# Patient Record
Sex: FEMALE | Race: WHITE | NOT HISPANIC OR LATINO | Employment: FULL TIME | URBAN - NONMETROPOLITAN AREA
[De-identification: names, ages, dates, MRNs, and addresses within clinical notes are randomized per-mention and may not be internally consistent; named-entity substitution may affect disease eponyms.]

---

## 2017-01-02 DIAGNOSIS — S29.012A RHOMBOID MUSCLE STRAIN, INITIAL ENCOUNTER: Primary | ICD-10-CM

## 2017-01-02 DIAGNOSIS — S46.819A STRAIN OF TRAPEZIUS MUSCLE: ICD-10-CM

## 2017-01-04 RX ORDER — CYCLOBENZAPRINE HCL 5 MG
TABLET ORAL
Qty: 30 TABLET | Refills: 0 | Status: SHIPPED | OUTPATIENT
Start: 2017-01-04 | End: 2017-05-04

## 2017-01-04 NOTE — TELEPHONE ENCOUNTER
flexeril      Last Written Prescription Date: 11/16/15  Last Fill Quantity: 30,  # refills: 1   Last Office Visit with G, [facility] or [facility] prescribing provider: 10/26/16

## 2017-01-24 ENCOUNTER — TRANSFERRED RECORDS (OUTPATIENT)
Dept: HEALTH INFORMATION MANAGEMENT | Facility: HOSPITAL | Age: 52
End: 2017-01-24

## 2017-04-06 ENCOUNTER — OFFICE VISIT (OUTPATIENT)
Dept: FAMILY MEDICINE | Facility: OTHER | Age: 52
End: 2017-04-06
Attending: FAMILY MEDICINE
Payer: COMMERCIAL

## 2017-04-06 VITALS
WEIGHT: 200 LBS | DIASTOLIC BLOOD PRESSURE: 70 MMHG | SYSTOLIC BLOOD PRESSURE: 122 MMHG | BODY MASS INDEX: 33.32 KG/M2 | HEART RATE: 64 BPM | HEIGHT: 65 IN | TEMPERATURE: 98.3 F

## 2017-04-06 DIAGNOSIS — Z11.59 ENCOUNTER FOR SCREENING FOR OTHER VIRAL DISEASES: ICD-10-CM

## 2017-04-06 DIAGNOSIS — I10 ESSENTIAL HYPERTENSION, BENIGN: ICD-10-CM

## 2017-04-06 DIAGNOSIS — G35 MS (MULTIPLE SCLEROSIS) (H): ICD-10-CM

## 2017-04-06 DIAGNOSIS — R25.2 CRAMP OF LIMB: Primary | ICD-10-CM

## 2017-04-06 DIAGNOSIS — E78.5 HYPERLIPIDEMIA LDL GOAL <130: ICD-10-CM

## 2017-04-06 DIAGNOSIS — E03.9 HYPOTHYROIDISM, UNSPECIFIED TYPE: ICD-10-CM

## 2017-04-06 LAB
ALBUMIN SERPL-MCNC: 3.8 G/DL (ref 3.4–5)
ALP SERPL-CCNC: 83 U/L (ref 40–150)
ALT SERPL W P-5'-P-CCNC: 18 U/L (ref 0–50)
ANION GAP SERPL CALCULATED.3IONS-SCNC: 10 MMOL/L (ref 3–14)
AST SERPL W P-5'-P-CCNC: 15 U/L (ref 0–45)
BILIRUB SERPL-MCNC: 0.2 MG/DL (ref 0.2–1.3)
BUN SERPL-MCNC: 15 MG/DL (ref 7–30)
CALCIUM SERPL-MCNC: 8.9 MG/DL (ref 8.5–10.1)
CHLORIDE SERPL-SCNC: 103 MMOL/L (ref 94–109)
CO2 SERPL-SCNC: 30 MMOL/L (ref 20–32)
CREAT SERPL-MCNC: 0.66 MG/DL (ref 0.52–1.04)
GFR SERPL CREATININE-BSD FRML MDRD: NORMAL ML/MIN/1.7M2
GLUCOSE SERPL-MCNC: 86 MG/DL (ref 70–99)
POTASSIUM SERPL-SCNC: 4.1 MMOL/L (ref 3.4–5.3)
PROT SERPL-MCNC: 7.5 G/DL (ref 6.8–8.8)
SODIUM SERPL-SCNC: 143 MMOL/L (ref 133–144)
TSH SERPL DL<=0.005 MIU/L-ACNC: 3.34 MU/L (ref 0.4–4)

## 2017-04-06 PROCEDURE — 36415 COLL VENOUS BLD VENIPUNCTURE: CPT | Performed by: FAMILY MEDICINE

## 2017-04-06 PROCEDURE — 86803 HEPATITIS C AB TEST: CPT | Mod: 90 | Performed by: FAMILY MEDICINE

## 2017-04-06 PROCEDURE — 99214 OFFICE O/P EST MOD 30 MIN: CPT | Performed by: FAMILY MEDICINE

## 2017-04-06 PROCEDURE — 84443 ASSAY THYROID STIM HORMONE: CPT | Performed by: FAMILY MEDICINE

## 2017-04-06 PROCEDURE — 80053 COMPREHEN METABOLIC PANEL: CPT | Performed by: FAMILY MEDICINE

## 2017-04-06 PROCEDURE — 99000 SPECIMEN HANDLING OFFICE-LAB: CPT | Performed by: FAMILY MEDICINE

## 2017-04-06 ASSESSMENT — ANXIETY QUESTIONNAIRES
1. FEELING NERVOUS, ANXIOUS, OR ON EDGE: NOT AT ALL
IF YOU CHECKED OFF ANY PROBLEMS ON THIS QUESTIONNAIRE, HOW DIFFICULT HAVE THESE PROBLEMS MADE IT FOR YOU TO DO YOUR WORK, TAKE CARE OF THINGS AT HOME, OR GET ALONG WITH OTHER PEOPLE: NOT DIFFICULT AT ALL
3. WORRYING TOO MUCH ABOUT DIFFERENT THINGS: NOT AT ALL
2. NOT BEING ABLE TO STOP OR CONTROL WORRYING: NOT AT ALL
7. FEELING AFRAID AS IF SOMETHING AWFUL MIGHT HAPPEN: NOT AT ALL
GAD7 TOTAL SCORE: 1
5. BEING SO RESTLESS THAT IT IS HARD TO SIT STILL: NOT AT ALL
4. TROUBLE RELAXING: SEVERAL DAYS
6. BECOMING EASILY ANNOYED OR IRRITABLE: NOT AT ALL

## 2017-04-06 ASSESSMENT — ASTHMA QUESTIONNAIRES
QUESTION_3 LAST FOUR WEEKS HOW OFTEN DID YOUR ASTHMA SYMPTOMS (WHEEZING, COUGHING, SHORTNESS OF BREATH, CHEST TIGHTNESS OR PAIN) WAKE YOU UP AT NIGHT OR EARLIER THAN USUAL IN THE MORNING: NOT AT ALL
QUESTION_1 LAST FOUR WEEKS HOW MUCH OF THE TIME DID YOUR ASTHMA KEEP YOU FROM GETTING AS MUCH DONE AT WORK, SCHOOL OR AT HOME: NONE OF THE TIME
QUESTION_4 LAST FOUR WEEKS HOW OFTEN HAVE YOU USED YOUR RESCUE INHALER OR NEBULIZER MEDICATION (SUCH AS ALBUTEROL): NOT AT ALL
QUESTION_2 LAST FOUR WEEKS HOW OFTEN HAVE YOU HAD SHORTNESS OF BREATH: NOT AT ALL
QUESTION_5 LAST FOUR WEEKS HOW WOULD YOU RATE YOUR ASTHMA CONTROL: COMPLETELY CONTROLLED
ACT_TOTALSCORE: 25

## 2017-04-06 ASSESSMENT — PAIN SCALES - GENERAL: PAINLEVEL: MODERATE PAIN (4)

## 2017-04-06 NOTE — PROGRESS NOTES
SUBJECTIVE:                                                    Yvonne Llanos is a 51 year old female who presents to clinic today for the following health issues:      Musculoskeletal problem/pain      Duration: 4 days    Description  Location: left side of neck    Intensity:  moderate    Accompanying signs and symptoms: felt like a milton horse    History  Previous similar problem: no   Previous evaluation:  none    Precipitating or alleviating factors:  Trauma or overuse: no   Aggravating factors include: moving head side to side     Therapies tried and outcome: heat and massage       Hypothyroidism Follow-up      Since last visit, patient describes the following symptoms: weight gain of 40 lbs in 3-4 mos, constipation and fatigue       Problem list and histories reviewed & adjusted, as indicated.  Additional history: as documented    Current Outpatient Prescriptions   Medication Sig Dispense Refill     [START ON 4/11/2017] metaxalone (SKELAXIN) 800 MG tablet TAKE 1 TABLET BY MOUTH THREE TIMES DAILY 90 tablet 1     GABAPENTIN PO Take 300 mg by mouth 3 times daily       cyclobenzaprine (FLEXERIL) 5 MG tablet TAKE 1 TABLET BY MOUTH NIGHTLY AS NEEDED FOR MUSCLE SPASMS 30 tablet 0     cholecalciferol (VITAMIN D3) 5000 UNITS CAPS capsule Take 1 capsule (5,000 Units) by mouth daily  0     atenolol (TENORMIN) 25 MG tablet TAKE 1 TABLET BY MOUTH TWICE DAILY 60 tablet 11     butalbital-acetaminophen-caffeine (FIORICET, ESGIC) -40 MG per tablet TAKE 1 TABLET BY MOUTH THREE TIMES WEEKLY AS NEEDED FOR HEADACHE 30 tablet 5     LOSARTAN POTASSIUM PO Take 100 mg by mouth At Bedtime        folic acid-vit B6-vit B12 (FOLGARD) 0.8-10-0.115 MG TABS Take 1 tablet by mouth daily       montelukast (SINGULAIR) 10 MG tablet TAKE 1 TABLET BY MOUTH EVERY EVENING 30 tablet 7     Multiple Vitamins-Minerals (MULTIVITAMIN GUMMIES ADULT) CHEW Take 2 tablets by mouth daily 30 tablet 0     [DISCONTINUED] gabapentin (NEURONTIN) 100 MG  "capsule Take 1-2 capsules (100-200 mg) by mouth nightly as needed 40 capsule 0     Labs reviewed in EPIC    ROS:  Constitutional, HEENT, cardiovascular, pulmonary, gi and gu systems are negative, except as otherwise noted.    OBJECTIVE:                                                    /70  Pulse 64  Temp 98.3  F (36.8  C) (Tympanic)  Ht 5' 4.5\" (1.638 m)  Wt 200 lb (90.7 kg)  BMI 33.8 kg/m2  Body mass index is 33.8 kg/(m^2).  GENERAL APPEARANCE: healthy, alert, no distress and over weight  HENT: ear canals and TM's normal, nose and mouth without ulcers or lesions and tonsils show tonsilliths  NECK: no adenopathy, no asymmetry, masses, or scars and thyroid normal to palpation  RESP: lungs clear to auscultation - no rales, rhonchi or wheezes  CV: regular rates and rhythm, normal S1 S2, no S3 or S4 and no murmur, click or rub  MS: extremities normal- no gross deformities noted  PSYCH: mentation appears normal and affect normal/bright       ASSESSMENT/PLAN:                                                    1. Cramp of limb    - Comprehensive metabolic panel  - TSH with free T4 reflex    2. Essential hypertension, benign      3. Hyperlipidemia LDL goal <130      4. Hypothyroidism, unspecified type    - Comprehensive metabolic panel  - TSH with free T4 reflex    5. Encounter for screening for other viral diseases    - Hepatitis C Screen Reflex to HCV RNA Quant and Genotype    She was encouraged to schedule CPE in the near fuutre      Patient was agreeable to this plan and had no further questions.  See Patient Instructions    Yudy Hsu MD  [facility] [place]      "

## 2017-04-06 NOTE — NURSING NOTE
"Chief Complaint   Patient presents with     Musculoskeletal Problem       Initial /70  Pulse 64  Temp 98.3  F (36.8  C) (Tympanic)  Ht 5' 4.5\" (1.638 m)  Wt 200 lb (90.7 kg)  BMI 33.8 kg/m2 Estimated body mass index is 33.8 kg/(m^2) as calculated from the following:    Height as of this encounter: 5' 4.5\" (1.638 m).    Weight as of this encounter: 200 lb (90.7 kg).  Medication Reconciliation: complete   Cathy Greene    "

## 2017-04-06 NOTE — MR AVS SNAPSHOT
After Visit Summary   4/6/2017    Yvonne Llanos    MRN: [id number]           Patient Information     Date Of Birth          1965        Visit Information        Provider Department      4/6/2017 2:30 PM Yudy Hsu MD [facility]        Today's Diagnoses     Cramp of limb    -  1    Essential hypertension, benign        Hyperlipidemia LDL goal <130        Hypothyroidism, unspecified type        Encounter for screening for other viral diseases           Follow-ups after your visit        Your next 10 appointments already scheduled     May 04, 2017  1:30 PM CDT   (Arrive by 1:15 PM)   PHYSICAL with Yudy Hsu MD   [facility] ([facility])    [address]  [facility] [address]   [phone]              Who to contact     If you have questions or need follow up information about today's clinic visit or your schedule please contact [facility] directly at [phone].  Normal or non-critical lab and imaging results will be communicated to you by MyChart, letter or phone within 4 business days after the clinic has received the results. If you do not hear from us within 7 days, please contact the clinic through MyChart or phone. If you have a critical or abnormal lab result, we will notify you by phone as soon as possible.  Submit refill requests through [employer] or call your pharmacy and they will forward the refill request to us. Please allow 3 business days for your refill to be completed.          Additional Information About Your Visit        MyChart Information     [employer] gives you secure access to your electronic health record. If you see a primary care provider, you can also send messages to your care team and make appointments. If you have questions, please call your primary care clinic.  If you do not have a primary care provider, please call [phone] and they will assist you.        Care EveryWhere ID     This is your  "Care EveryWhere ID. This could be used by other organizations to access your [place] medical records  [id number]        Your Vitals Were     Pulse Temperature Height BMI (Body Mass Index)          64 98.3  F (36.8  C) (Tympanic) 5' 4.5\" (1.638 m) 33.8 kg/m2         Blood Pressure from Last 3 Encounters:   04/06/17 122/70   10/26/16 120/84   04/07/16 120/80    Weight from Last 3 Encounters:   04/06/17 200 lb (90.7 kg)   10/26/16 182 lb (82.6 kg)   04/07/16 174 lb (78.9 kg)              We Performed the Following     Comprehensive metabolic panel     Hepatitis C Screen Reflex to HCV RNA Quant and Genotype     TSH with free T4 reflex          Today's Medication Changes          These changes are accurate as of: 4/6/17  2:33 PM.  If you have any questions, ask your nurse or doctor.               These medicines have changed or have updated prescriptions.        Dose/Directions    GABAPENTIN PO   This may have changed:  Another medication with the same name was removed. Continue taking this medication, and follow the directions you see here.   Changed by:  Yudy Hsu MD        Dose:  300 mg   Take 300 mg by mouth 3 times daily   Refills:  0                Primary Care Provider Office Phone # Fax #    Yudy Hsu [phone]       [facility] [place] [address] [place] [id number]        Thank you!     Thank you for choosing [facility]  for your care. Our goal is always to provide you with excellent care. Hearing back from our patients is one way we can continue to improve our services. Please take a few minutes to complete the written survey that you may receive in the mail after your visit with us. Thank you!             Your Updated Medication List - Protect others around you: Learn how to safely use, store and throw away your medicines at www.disposemymeds.org.          This list is accurate as of: 4/6/17  2:33 PM.  Always use your most recent med list.       "             Brand Name Dispense Instructions for use    atenolol 25 MG tablet    TENORMIN    60 tablet    TAKE 1 TABLET BY MOUTH TWICE DAILY       butalbital-acetaminophen-caffeine -40 MG per tablet    FIORICET/ESGIC    30 tablet    TAKE 1 TABLET BY MOUTH THREE TIMES WEEKLY AS NEEDED FOR HEADACHE       cholecalciferol 5000 UNITS Caps capsule    vitamin D3     Take 1 capsule (5,000 Units) by mouth daily       cyclobenzaprine 5 MG tablet    FLEXERIL    30 tablet    TAKE 1 TABLET BY MOUTH NIGHTLY AS NEEDED FOR MUSCLE SPASMS       folic acid-vit B6-vit B12 0.8-10-0.115 MG Tabs per tablet    FOLGARD     Take 1 tablet by mouth daily       GABAPENTIN PO      Take 300 mg by mouth 3 times daily       LOSARTAN POTASSIUM PO      Take 100 mg by mouth At Bedtime       metaxalone 800 MG tablet   Start taking on:  4/11/2017    SKELAXIN    90 tablet    TAKE 1 TABLET BY MOUTH THREE TIMES DAILY       montelukast 10 MG tablet    SINGULAIR    30 tablet    TAKE 1 TABLET BY MOUTH EVERY EVENING       MULTIVITAMIN GUMMIES ADULT Chew     30 tablet    Take 2 tablets by mouth daily

## 2017-04-07 RX ORDER — GABAPENTIN 400 MG/1
400 CAPSULE ORAL 3 TIMES DAILY
Qty: 90 CAPSULE | Refills: 0 | Status: SHIPPED | OUTPATIENT
Start: 2017-04-07 | End: 2017-05-04

## 2017-04-07 ASSESSMENT — ASTHMA QUESTIONNAIRES: ACT_TOTALSCORE: 25

## 2017-04-07 ASSESSMENT — PATIENT HEALTH QUESTIONNAIRE - PHQ9: SUM OF ALL RESPONSES TO PHQ QUESTIONS 1-9: 4

## 2017-04-07 ASSESSMENT — ANXIETY QUESTIONNAIRES: GAD7 TOTAL SCORE: 1

## 2017-04-09 LAB — HCV AB SERPL QL IA: NORMAL

## 2017-04-25 DIAGNOSIS — I10 BENIGN ESSENTIAL HYPERTENSION: Primary | ICD-10-CM

## 2017-04-27 RX ORDER — LOSARTAN POTASSIUM 100 MG/1
TABLET ORAL
Qty: 90 TABLET | Refills: 0 | Status: SHIPPED | OUTPATIENT
Start: 2017-04-27 | End: 2017-05-04

## 2017-05-02 DIAGNOSIS — I10 BENIGN ESSENTIAL HYPERTENSION: ICD-10-CM

## 2017-05-04 ENCOUNTER — TELEPHONE (OUTPATIENT)
Dept: FAMILY MEDICINE | Facility: OTHER | Age: 52
End: 2017-05-04

## 2017-05-04 ENCOUNTER — OFFICE VISIT (OUTPATIENT)
Dept: FAMILY MEDICINE | Facility: OTHER | Age: 52
End: 2017-05-04
Attending: FAMILY MEDICINE
Payer: COMMERCIAL

## 2017-05-04 VITALS
TEMPERATURE: 98.5 F | OXYGEN SATURATION: 97 % | WEIGHT: 194 LBS | RESPIRATION RATE: 17 BRPM | HEART RATE: 67 BPM | HEIGHT: 66 IN | SYSTOLIC BLOOD PRESSURE: 123 MMHG | BODY MASS INDEX: 31.18 KG/M2 | DIASTOLIC BLOOD PRESSURE: 72 MMHG

## 2017-05-04 DIAGNOSIS — J30.2 SEASONAL ALLERGIC RHINITIS, UNSPECIFIED ALLERGIC RHINITIS TRIGGER: ICD-10-CM

## 2017-05-04 DIAGNOSIS — I10 ESSENTIAL HYPERTENSION, BENIGN: ICD-10-CM

## 2017-05-04 DIAGNOSIS — G35 MS (MULTIPLE SCLEROSIS) (H): ICD-10-CM

## 2017-05-04 DIAGNOSIS — Z12.11 SPECIAL SCREENING FOR MALIGNANT NEOPLASMS, COLON: ICD-10-CM

## 2017-05-04 DIAGNOSIS — Z12.31 ENCOUNTER FOR MAMMOGRAM TO ESTABLISH BASELINE MAMMOGRAM: ICD-10-CM

## 2017-05-04 DIAGNOSIS — Z00.00 ROUTINE GENERAL MEDICAL EXAMINATION AT A HEALTH CARE FACILITY: Primary | ICD-10-CM

## 2017-05-04 DIAGNOSIS — G43.809 OTHER MIGRAINE WITHOUT STATUS MIGRAINOSUS, NOT INTRACTABLE: ICD-10-CM

## 2017-05-04 PROCEDURE — 99396 PREV VISIT EST AGE 40-64: CPT | Performed by: FAMILY MEDICINE

## 2017-05-04 PROCEDURE — 99213 OFFICE O/P EST LOW 20 MIN: CPT | Mod: 25 | Performed by: FAMILY MEDICINE

## 2017-05-04 RX ORDER — GABAPENTIN 400 MG/1
400 CAPSULE ORAL 3 TIMES DAILY
Qty: 360 CAPSULE | Refills: 3 | Status: SHIPPED | OUTPATIENT
Start: 2017-05-04 | End: 2018-05-24

## 2017-05-04 RX ORDER — BUTALBITAL, ACETAMINOPHEN AND CAFFEINE 50; 325; 40 MG/1; MG/1; MG/1
TABLET ORAL
Qty: 36 TABLET | Refills: 3 | Status: SHIPPED | OUTPATIENT
Start: 2017-05-04 | End: 2018-07-26

## 2017-05-04 RX ORDER — LOSARTAN POTASSIUM 100 MG/1
TABLET ORAL
Qty: 90 TABLET | Refills: 3 | Status: SHIPPED | OUTPATIENT
Start: 2017-05-04 | End: 2018-07-03

## 2017-05-04 RX ORDER — MONTELUKAST SODIUM 10 MG/1
TABLET ORAL
Qty: 30 TABLET | Refills: 3 | Status: SHIPPED | OUTPATIENT
Start: 2017-05-04 | End: 2018-07-26

## 2017-05-04 RX ORDER — ATENOLOL 50 MG/1
50 TABLET ORAL DAILY
Qty: 90 TABLET | Refills: 3 | Status: SHIPPED | OUTPATIENT
Start: 2017-05-04 | End: 2017-09-14

## 2017-05-04 RX ORDER — CYCLOBENZAPRINE HCL 5 MG
TABLET ORAL
Qty: 90 TABLET | Refills: 1 | Status: SHIPPED | OUTPATIENT
Start: 2017-05-04 | End: 2017-09-14

## 2017-05-04 RX ORDER — METAXALONE 800 MG/1
TABLET ORAL
Qty: 270 TABLET | Refills: 1 | Status: SHIPPED | OUTPATIENT
Start: 2017-05-04 | End: 2017-09-14

## 2017-05-04 ASSESSMENT — PAIN SCALES - GENERAL: PAINLEVEL: MILD PAIN (3)

## 2017-05-04 NOTE — NURSING NOTE
"Chief Complaint   Patient presents with     Physical       Initial /72  Pulse 67  Temp 98.5  F (36.9  C)  Resp 17  Ht 5' 5.5\" (1.664 m)  Wt 194 lb (88 kg)  SpO2 97%  BMI 31.79 kg/m2 Estimated body mass index is 31.79 kg/(m^2) as calculated from the following:    Height as of this encounter: 5' 5.5\" (1.664 m).    Weight as of this encounter: 194 lb (88 kg).  Medication Reconciliation: complete  "

## 2017-05-04 NOTE — TELEPHONE ENCOUNTER
3:01 PM    Reason for Call: Phone Call    Description: Pt called and stated the wrong med was called into [facility] in [place]. She wanted the butalbital-acetaminophen-caffeine called in. Please call her back at [phone]    Was an appointment offered for this call? No    Preferred method for responding to this message: Telephone Call    If we cannot reach you directly, may we leave a detailed response at the number you provided? Yes    Can this message wait until your PCP/provider returns, if available today? Not applicable    Yvonne Navarro

## 2017-05-04 NOTE — MR AVS SNAPSHOT
After Visit Summary   5/4/2017    Yvonne Llanos    MRN: [id number]           Patient Information     Date Of Birth          1965        Visit Information        Provider Department      5/4/2017 1:30 PM Yudy Hsu MD [facility] [place]        Today's Diagnoses     Routine general medical examination at a health care facility    -  1    Encounter for mammogram to establish baseline mammogram        Essential hypertension, benign        MS (multiple sclerosis) (H)        Other migraine without status migrainosus, not intractable        Seasonal allergic rhinitis, unspecified allergic rhinitis trigger        Special screening for malignant neoplasms, colon        Pap smear of cervix reminder not needed forever           Follow-ups after your visit        Future tests that were ordered for you today     Open Future Orders        Priority Expected Expires Ordered    Immunos occult blood Routine 5/11/2017 5/4/2018 5/4/2017    Lipid Profile (Chol, Trig, HDL, LDL calc) Routine 5/3/2017 5/2/2018 5/2/2017            Who to contact     If you have questions or need follow up information about today's clinic visit or your schedule please contact [facility] [place] directly at [phone].  Normal or non-critical lab and imaging results will be communicated to you by [employer], letter or phone within 4 business days after the clinic has received the results. If you do not hear from us within 7 days, please contact the clinic through [employer] or phone. If you have a critical or abnormal lab result, we will notify you by phone as soon as possible.  Submit refill requests through [employer] or call your pharmacy and they will forward the refill request to us. Please allow 3 business days for your refill to be completed.          Additional Information About Your Visit        MyChart Information     [employer] gives you secure access to your electronic health record. If you see a primary care  "provider, you can also send messages to your care team and make appointments. If you have questions, please call your primary care clinic.  If you do not have a primary care provider, please call [phone] and they will assist you.        Care EveryWhere ID     This is your Care EveryWhere ID. This could be used by other organizations to access your [place] medical records  [id number]        Your Vitals Were     Pulse Temperature Respirations Height Pulse Oximetry BMI (Body Mass Index)    67 98.5  F (36.9  C) 17 5' 5.5\" (1.664 m) 97% 31.79 kg/m2       Blood Pressure from Last 3 Encounters:   05/04/17 123/72   04/06/17 122/70   10/26/16 120/84    Weight from Last 3 Encounters:   05/04/17 194 lb (88 kg)   04/06/17 200 lb (90.7 kg)   10/26/16 182 lb (82.6 kg)              We Performed the Following     MA SCREENING DIGITAL BILATERAL ([place])          Today's Medication Changes          These changes are accurate as of: 5/4/17  2:59 PM.  If you have any questions, ask your nurse or doctor.               Start taking these medicines.        Dose/Directions    atenolol 50 MG tablet   Commonly known as:  TENORMIN   Used for:  Essential hypertension, benign   Started by:  Yudy Hsu MD        Dose:  50 mg   Take 1 tablet (50 mg) by mouth daily   Quantity:  90 tablet   Refills:  3         These medicines have changed or have updated prescriptions.        Dose/Directions    cyclobenzaprine 5 MG tablet   Commonly known as:  FLEXERIL   This may have changed:  See the new instructions.   Used for:  MS (multiple sclerosis) (H)   Changed by:  Yudy Hsu MD        TAKE 1 TABLET BY MOUTH NIGHTLY AS NEEDED FOR MUSCLE SPASMS   Quantity:  90 tablet   Refills:  1       losartan 100 MG tablet   Commonly known as:  COZAAR   This may have changed:  See the new instructions.   Used for:  Essential hypertension, benign   Changed by:  Yudy Hsu MD        TAKE 1 TABLET BY MOUTH ONCE DAILY.   Quantity:  90 " tablet   Refills:  3       metaxalone 800 MG tablet   Commonly known as:  SKELAXIN   This may have changed:  See the new instructions.   Used for:  MS (multiple sclerosis) (H)   Changed by:  Yudy Hsu MD        TAKE 1 TABLET BY MOUTH THREE TIMES DAILY   Quantity:  270 tablet   Refills:  1       montelukast 10 MG tablet   Commonly known as:  SINGULAIR   This may have changed:  See the new instructions.   Used for:  Seasonal allergic rhinitis, unspecified allergic rhinitis trigger   Changed by:  Yudy Hsu MD        TAKE 1 TABLET BY MOUTH EVERY EVENING   Quantity:  30 tablet   Refills:  3            Where to get your medicines      These medications were sent to [id number] - [place], [place] - [address] AT [facility] [address], [place] [address]     Phone:  [phone]     atenolol 50 MG tablet    cyclobenzaprine 5 MG tablet    gabapentin 400 MG capsule    losartan 100 MG tablet    metaxalone 800 MG tablet    montelukast 10 MG tablet         Some of these will need a paper prescription and others can be bought over the counter.  Ask your nurse if you have questions.     Bring a paper prescription for each of these medications     butalbital-acetaminophen-caffeine -40 MG per tablet                Primary Care Provider Office Phone # Fax #    Yudy Hsu [phone]       [facility] [place] [address]  [place] [address]        Thank you!     Thank you for choosing [facility]  for your care. Our goal is always to provide you with excellent care. Hearing back from our patients is one way we can continue to improve our services. Please take a few minutes to complete the written survey that you may receive in the mail after your visit with us. Thank you!             Your Updated Medication List - Protect others around you: Learn how to safely use, store and throw away your medicines at www.disposemymeds.org.           This list is accurate as of: 5/4/17  2:59 PM.  Always use your most recent med list.                   Brand Name Dispense Instructions for use    atenolol 50 MG tablet    TENORMIN    90 tablet    Take 1 tablet (50 mg) by mouth daily       butalbital-acetaminophen-caffeine -40 MG per tablet    FIORICET/ESGIC    36 tablet    TAKE 1 TABLET BY MOUTH THREE TIMES WEEKLY AS NEEDED FOR HEADACHE       cholecalciferol 5000 UNITS Caps capsule    vitamin D3     Take 1 capsule (5,000 Units) by mouth daily       cyclobenzaprine 5 MG tablet    FLEXERIL    90 tablet    TAKE 1 TABLET BY MOUTH NIGHTLY AS NEEDED FOR MUSCLE SPASMS       folic acid-vit B6-vit B12 0.8-10-0.115 MG Tabs per tablet    FOLGARD     Take 1 tablet by mouth daily       gabapentin 400 MG capsule    NEURONTIN    360 capsule    Take 1 capsule (400 mg) by mouth 3 times daily       losartan 100 MG tablet    COZAAR    90 tablet    TAKE 1 TABLET BY MOUTH ONCE DAILY.       metaxalone 800 MG tablet    SKELAXIN    270 tablet    TAKE 1 TABLET BY MOUTH THREE TIMES DAILY       montelukast 10 MG tablet    SINGULAIR    30 tablet    TAKE 1 TABLET BY MOUTH EVERY EVENING       MULTIVITAMIN GUMMIES ADULT Chew     30 tablet    Take 2 tablets by mouth daily

## 2017-05-04 NOTE — PROGRESS NOTES
SUBJECTIVE:     CC: Yvonne Llanos is an 51 year old woman who presents for preventive health visit.     Physical   Annual:     Getting at least 3 servings of Calcium per day::  Yes    Bi-annual eye exam::  Yes    Dental care twice a year::  Yes    Sleep apnea or symptoms of sleep apnea::  Daytime drowsiness    Diet::  Low salt    Frequency of exercise::  6-7 days/week    Duration of exercise::  15-30 minutes    Taking medications regularly::  Yes    Medication side effects::  None    Additional concerns today::  No        Today's PHQ-2 Score:   PHQ-2 ( 1999 [employer]) 5/4/2017   Q1: Little interest or pleasure in doing things -   Q2: Feeling down, depressed or hopeless -   PHQ-2 Score -   Little interest or pleasure in doing things Not at all   Feeling down, depressed or hopeless Not at all   PHQ-2 Score 0       Abuse: Current or Past(Physical, Sexual or Emotional)- No  Do you feel safe in your environment - Yes    Social History   Substance Use Topics     Smoking status: Never Smoker     Smokeless tobacco: Never Used     Alcohol use 0.0 oz/week     0 Standard drinks or equivalent per week      Comment: rarely     The patient does not drink >3 drinks per day nor >7 drinks per week.    Recent Labs   Lab Test  07/17/13   0833   CHOL  210*   HDL  40   LDL  99   TRIG  355*   CHOLHDLRATIO  5.3*       Reviewed orders with patient.  Reviewed health maintenance and updated orders accordingly - Yes    Mammo Decision Support:  Patient feels strongly about no family history of cancer, and doesn't want radiation exposure    Pertinent mammograms are reviewed under the imaging tab.  History of abnormal Pap smear: Status post benign hysterectomy. Health Maintenance and Surgical History updated.    Reviewed and updated as needed this visit by clinical staff  Allergies  Meds         Reviewed and updated as needed this visit by Provider        Past Medical History:   Diagnosis Date     Asthma     mild, [place], no issues since      Dyslipidemia 2011     Endometriosis      Fibromyalgia      Hypertension 2011     Migraines      Other abnormal glucose 2011     Pancreatitis due to biliary obstruction     improved after cholecystectomy     Seasonal Allergies 2011     Sleep apnea     on CPAP pressure of 8, resolved s/p  gastric sleeve      Past Surgical History:   Procedure Laterality Date      x2       CHOLECYSTECTOMY       Hysterectomy with BSO      Endometriosis     LAPAROSCOPIC GASTRIC SLEEVE  2014    [facility]     LAPAROSCOPY      D&C, exploratory,  placental tissue adhered to uterus     rotator cuff tendon surgery Left  2015    [facility]     Obstetric History       T3      TAB0   SAB0   E0   M0   L3       # Outcome Date GA Lbr Hany/2nd Weight Sex Delivery Anes PTL Lv   3 Term            2 Term            1 Term               Obstetric Comments   Breast fed       ROS:  C: NEGATIVE for fever, chills, change in weight  I: NEGATIVE for worrisome rashes, moles or lesions  E: NEGATIVE for vision changes or irritation  ENT: NEGATIVE for ear, mouth and throat problems  R: NEGATIVE for significant cough or SOB  B: NEGATIVE for masses, tenderness or discharge  CV: NEGATIVE for chest pain, palpitations or peripheral edema  GI: NEGATIVE for nausea, abdominal pain, heartburn, or change in bowel habits  : NEGATIVE for unusual urinary or vaginal symptoms. No vaginal bleeding.  MUSCULOSKELETAL:POSITIVE  for arthralgias, joint stiffness, muscle spasm, muscle weakness and neck pain  NEURO: POSITIVE for dysarthria, HX headaches-migraine, involuntary movements, radicular pain  and visual change but her prism glasses help  P: NEGATIVE for changes in mood or affect     Problem list, Medication list, Allergies, and Medical/Social/Surgical histories reviewed in [facility] and updated as appropriate.  Labs reviewed in EPIC  OBJECTIVE:     /72  Pulse 67  Temp 98.5  F (36.9  " C)  Resp 17  Ht 5' 5.5\" (1.664 m)  Wt 194 lb (88 kg)  SpO2 97%  BMI 31.79 kg/m2  EXAM:  GENERAL: healthy, alert and no distress  EYES: Eyes grossly normal to inspection, PERRL and conjunctivae and sclerae normal  HENT: ear canals and TM's normal, nose and mouth without ulcers or lesions  NECK: no adenopathy, no asymmetry, masses, or scars and thyroid normal to palpation  RESP: lungs clear to auscultation - no rales, rhonchi or wheezes  BREAST: normal without masses, tenderness or nipple discharge and no palpable axillary masses or adenopathy  CV: regular rate and rhythm, normal S1 S2, no S3 or S4, no murmur, click or rub, no peripheral edema and peripheral pulses strong  ABDOMEN: soft, nontender, no hepatosplenomegaly, no masses and bowel sounds normal   (female): patient deferred even external exam  MS: no gross musculoskeletal defects noted, no edema  SKIN: no suspicious lesions or rashes  NEURO: Normal strength and tone, mentation intact and speech normal  PSYCH: mentation appears normal, affect normal/bright    ASSESSMENT/PLAN:     1. Routine general medical examination at a health care facility    - Lipid Profile (Chol, Trig, HDL, LDL calc); Future    2. Encounter for mammogram to establish baseline mammogram    - MA SCREENING DIGITAL BILATERAL ([place]); Future  - MA SCREENING DIGITAL BILATERAL ([place])    3. Essential hypertension, benign  rx refilled  - atenolol (TENORMIN) 50 MG tablet; Take 1 tablet (50 mg) by mouth daily  Dispense: 90 tablet; Refill: 3  - losartan (COZAAR) 100 MG tablet; TAKE 1 TABLET BY MOUTH ONCE DAILY.  Dispense: 90 tablet; Refill: 3    4. MS (multiple sclerosis) (H)  Refilled, she reports this has helped her significantly  Emphasized again anti-inflammatory diet, talked about at length  - gabapentin (NEURONTIN) 400 MG capsule; Take 1 capsule (400 mg) by mouth 3 times daily  Dispense: 360 capsule; Refill: 3    5. Other migraine without status migrainosus, not intractable    - " "butalbital-acetaminophen-caffeine (FIORICET/ESGIC) -40 MG per tablet; TAKE 1 TABLET BY MOUTH THREE TIMES WEEKLY AS NEEDED FOR HEADACHE  Dispense: 36 tablet; Refill: 3    6. Rhomboid muscle strain, initial encounter  Uses as needed  - cyclobenzaprine (FLEXERIL) 5 MG tablet; TAKE 1 TABLET BY MOUTH NIGHTLY AS NEEDED FOR MUSCLE SPASMS  Dispense: 90 tablet; Refill: 1    7. Costochondritis    - metaxalone (SKELAXIN) 800 MG tablet; TAKE 1 TABLET BY MOUTH THREE TIMES DAILY  Dispense: 270 tablet; Refill: 1    8. Seasonal allergic rhinitis, unspecified allergic rhinitis trigger    - montelukast (SINGULAIR) 10 MG tablet; TAKE 1 TABLET BY MOUTH EVERY EVENING  Dispense: 30 tablet; Refill: 3    9. Special screening for malignant neoplasms, colon  She is going to think about it but IFOB was given.  - Immunos occult blood; Future    COUNSELING:  Reviewed preventive health counseling, as reflected in patient instructions       Regular exercise       Healthy diet/nutrition       Vision screening       Hearing screening         reports that she has never smoked. She has never used smokeless tobacco.    Estimated body mass index is 33.8 kg/(m^2) as calculated from the following:    Height as of 4/6/17: 5' 4.5\" (1.638 m).    Weight as of 4/6/17: 200 lb (90.7 kg).   Weight management plan: Discussed healthy diet and exercise guidelines and patient will follow up in 6 months in clinic to re-evaluate.    Counseling Resources:  ATP IV Guidelines  Pooled Cohorts Equation Calculator  Breast Cancer Risk Calculator  FRAX Risk Assessment  ICSI Preventive Guidelines  Dietary Guidelines for Americans, 2010  USDA's MyPlate  ASA Prophylaxis  Lung CA Screening    Yudy Hsu MD  [facility] [place]swers for HPI/ROS submitted by the patient on 5/4/2017   Q1: Little interest or pleasure in doing things: 0=Not at all  Q2: Feeling down, depressed or hopeless: 0=Not at all  PHQ-2 Score: 0    "

## 2017-05-05 RX ORDER — ATENOLOL 25 MG/1
TABLET ORAL
Qty: 60 TABLET | Refills: 0 | OUTPATIENT
Start: 2017-05-05

## 2017-05-11 DIAGNOSIS — Z00.00 ROUTINE GENERAL MEDICAL EXAMINATION AT A HEALTH CARE FACILITY: ICD-10-CM

## 2017-05-11 LAB
CHOLEST SERPL-MCNC: 264 MG/DL
HDLC SERPL-MCNC: 70 MG/DL
LDLC SERPL CALC-MCNC: 130 MG/DL
NONHDLC SERPL-MCNC: 194 MG/DL
TRIGL SERPL-MCNC: 322 MG/DL

## 2017-05-11 PROCEDURE — 80061 LIPID PANEL: CPT | Performed by: FAMILY MEDICINE

## 2017-05-11 PROCEDURE — 36415 COLL VENOUS BLD VENIPUNCTURE: CPT | Performed by: FAMILY MEDICINE

## 2017-05-18 DIAGNOSIS — Z12.11 SPECIAL SCREENING FOR MALIGNANT NEOPLASMS, COLON: ICD-10-CM

## 2017-05-18 PROCEDURE — 82274 ASSAY TEST FOR BLOOD FECAL: CPT | Performed by: FAMILY MEDICINE

## 2017-05-22 LAB — HEMOCCULT SP1 STL QL: NEGATIVE

## 2017-06-20 ENCOUNTER — TELEPHONE (OUTPATIENT)
Dept: FAMILY MEDICINE | Facility: OTHER | Age: 52
End: 2017-06-20

## 2017-06-20 DIAGNOSIS — G35 MS (MULTIPLE SCLEROSIS) (H): Primary | ICD-10-CM

## 2017-06-20 NOTE — TELEPHONE ENCOUNTER
Pt needs an Insurance referral for Dr. Mcmullen [facility] Tele: [phone] for the whole year of 2017 up to 8 visit  DX: MS. Pt has been seen June 8th 2016=7 and will follow up multiple times  Dr. Ortiz also ordered and MRI Brain with and with out contrast and Cervical with and with out contrast  that was [place] 06/18/17  Please put in two referral so they can be submitted to Insurance referral dept.

## 2017-06-21 NOTE — TELEPHONE ENCOUNTER
Please see note below, also will send to Claudio Godinez who does the insurance referrals   CATALINA CASTELLANOS

## 2017-07-26 ENCOUNTER — TELEPHONE (OUTPATIENT)
Dept: FAMILY MEDICINE | Facility: OTHER | Age: 52
End: 2017-07-26

## 2017-07-26 DIAGNOSIS — I10 BENIGN ESSENTIAL HYPERTENSION: Primary | ICD-10-CM

## 2017-07-26 RX ORDER — METOPROLOL SUCCINATE 50 MG/1
50 TABLET, EXTENDED RELEASE ORAL DAILY
Qty: 30 TABLET | Refills: 1 | Status: SHIPPED | OUTPATIENT
Start: 2017-07-26 | End: 2017-09-14

## 2017-07-26 NOTE — TELEPHONE ENCOUNTER
Last office visit 05/04/17. Atenolol last filled 05/04/17. Per pharmacy atenolol is on backorder thru August. Pharmacy would like an alternate medication to give patient. Please advise.

## 2017-07-26 NOTE — TELEPHONE ENCOUNTER
Her blood pressure is pretty labile -- I recommend they get some atenolol from another pharmacy in the meantime

## 2017-09-13 ENCOUNTER — TELEPHONE (OUTPATIENT)
Dept: FAMILY MEDICINE | Facility: OTHER | Age: 52
End: 2017-09-13

## 2017-09-13 NOTE — TELEPHONE ENCOUNTER
7:41 AM    Reason for Call: Phone Call    Description: Tea states she is having blood pressure issues and is wondering if she can be seen Thursday around the same time as her daughter Vera Wilson 11;00. Please call Tea to advise.     Was an appointment offered for this call? No    Preferred method for responding to this message: [phone]    If we cannot reach you directly, may we leave a detailed response at the number you provided?  Yes    Aleida Mukherjee

## 2017-09-14 ENCOUNTER — OFFICE VISIT (OUTPATIENT)
Dept: FAMILY MEDICINE | Facility: OTHER | Age: 52
End: 2017-09-14
Attending: FAMILY MEDICINE
Payer: COMMERCIAL

## 2017-09-14 VITALS
DIASTOLIC BLOOD PRESSURE: 92 MMHG | TEMPERATURE: 97 F | BODY MASS INDEX: 33.01 KG/M2 | SYSTOLIC BLOOD PRESSURE: 146 MMHG | HEART RATE: 66 BPM | WEIGHT: 205.38 LBS | OXYGEN SATURATION: 100 % | HEIGHT: 66 IN

## 2017-09-14 DIAGNOSIS — I10 BENIGN ESSENTIAL HYPERTENSION: ICD-10-CM

## 2017-09-14 DIAGNOSIS — G35 MS (MULTIPLE SCLEROSIS) (H): Primary | ICD-10-CM

## 2017-09-14 DIAGNOSIS — I10 ESSENTIAL HYPERTENSION, BENIGN: ICD-10-CM

## 2017-09-14 PROCEDURE — 99214 OFFICE O/P EST MOD 30 MIN: CPT | Performed by: FAMILY MEDICINE

## 2017-09-14 RX ORDER — TERIFLUNOMIDE 14 MG/1
14 TABLET, FILM COATED ORAL DAILY
COMMUNITY
Start: 2017-09-14 | End: 2018-07-19

## 2017-09-14 RX ORDER — METOPROLOL SUCCINATE 50 MG/1
50 TABLET, EXTENDED RELEASE ORAL DAILY
Qty: 90 TABLET | Refills: 1 | Status: SHIPPED | OUTPATIENT
Start: 2017-09-14 | End: 2017-10-26 | Stop reason: DRUGHIGH

## 2017-09-14 RX ORDER — BACLOFEN 20 MG/1
20 TABLET ORAL 2 TIMES DAILY
Qty: 30 TABLET | Refills: 3 | COMMUNITY
Start: 2017-09-14 | End: 2019-02-14

## 2017-09-14 RX ORDER — METOPROLOL SUCCINATE 25 MG/1
25 TABLET, EXTENDED RELEASE ORAL DAILY
Qty: 30 TABLET | Refills: 1 | Status: SHIPPED | OUTPATIENT
Start: 2017-09-14 | End: 2017-10-26 | Stop reason: DRUGHIGH

## 2017-09-14 ASSESSMENT — ASTHMA QUESTIONNAIRES
QUESTION_5 LAST FOUR WEEKS HOW WOULD YOU RATE YOUR ASTHMA CONTROL: COMPLETELY CONTROLLED
ACT_TOTALSCORE: 25
QUESTION_2 LAST FOUR WEEKS HOW OFTEN HAVE YOU HAD SHORTNESS OF BREATH: NOT AT ALL
QUESTION_1 LAST FOUR WEEKS HOW MUCH OF THE TIME DID YOUR ASTHMA KEEP YOU FROM GETTING AS MUCH DONE AT WORK, SCHOOL OR AT HOME: NONE OF THE TIME
QUESTION_4 LAST FOUR WEEKS HOW OFTEN HAVE YOU USED YOUR RESCUE INHALER OR NEBULIZER MEDICATION (SUCH AS ALBUTEROL): NOT AT ALL
QUESTION_3 LAST FOUR WEEKS HOW OFTEN DID YOUR ASTHMA SYMPTOMS (WHEEZING, COUGHING, SHORTNESS OF BREATH, CHEST TIGHTNESS OR PAIN) WAKE YOU UP AT NIGHT OR EARLIER THAN USUAL IN THE MORNING: NOT AT ALL

## 2017-09-14 ASSESSMENT — PAIN SCALES - GENERAL: PAINLEVEL: NO PAIN (0)

## 2017-09-14 NOTE — NURSING NOTE
"Chief Complaint   Patient presents with     Hypertension       Initial BP (!) 146/92 (BP Location: Right arm, Patient Position: Chair, Cuff Size: Adult Large)  Pulse 66  Temp 97  F (36.1  C)  Ht 5' 6\" (1.676 m)  Wt 205 lb 6 oz (93.2 kg)  SpO2 100%  BMI 33.15 kg/m2 Estimated body mass index is 33.15 kg/(m^2) as calculated from the following:    Height as of this encounter: 5' 6\" (1.676 m).    Weight as of this encounter: 205 lb 6 oz (93.2 kg).  Medication Reconciliation: complete     Stefani Gaffney        "

## 2017-09-14 NOTE — PROGRESS NOTES
SUBJECTIVE:                                                    Yvonne Llanos is a 51 year old female who presents to clinic today for the following health issues:      Hypertension Follow-up      Outpatient blood pressures are not being checked.    Low Salt Diet: no added salt        Amount of exercise or physical activity: 2-3 days/week for an average of 15-30 minutes    Problems taking medications regularly: No    Medication side effects: morning sickness, no vomitting, body aches all from augagio, baclofen makes tired  Diet: regular (no restrictions) and low salt    Problem list and histories reviewed & adjusted, as indicated.  Additional history: as documented    Current Outpatient Prescriptions   Medication Sig Dispense Refill     baclofen (LIORESAL) 20 MG tablet Take 1 tablet (20 mg) by mouth 2 times daily 30 tablet 3     teriflunomide (AUBAGIO) 14 MG tablet Take 1 tablet (14 mg) by mouth daily Only available through select specialty pharmacies       metoprolol (TOPROL-XL) 25 MG 24 hr tablet Take 1 tablet (25 mg) by mouth daily Take in addition to 50mg tablet daily 30 tablet 1     metoprolol (TOPROL-XL) 50 MG 24 hr tablet Take 1 tablet (50 mg) by mouth daily Take in addition to 25 mg tablet daily 90 tablet 1     losartan (COZAAR) 100 MG tablet TAKE 1 TABLET BY MOUTH ONCE DAILY. 90 tablet 3     butalbital-acetaminophen-caffeine (FIORICET/ESGIC) -40 MG per tablet TAKE 1 TABLET BY MOUTH THREE TIMES WEEKLY AS NEEDED FOR HEADACHE 36 tablet 3     montelukast (SINGULAIR) 10 MG tablet TAKE 1 TABLET BY MOUTH EVERY EVENING 30 tablet 3     Multiple Vitamins-Minerals (MULTIVITAMIN GUMMIES ADULT) CHEW Take 2 tablets by mouth daily 30 tablet 0     [DISCONTINUED] metoprolol (TOPROL-XL) 50 MG 24 hr tablet Take 1 tablet (50 mg) by mouth daily 30 tablet 1     gabapentin (NEURONTIN) 400 MG capsule Take 1 capsule (400 mg) by mouth 3 times daily 360 capsule 3     cholecalciferol (VITAMIN D3) 5000 UNITS CAPS capsule Take  "1 capsule (5,000 Units) by mouth daily  0     folic acid-vit B6-vit B12 (FOLGARD) 0.8-10-0.115 MG TABS Take 1 tablet by mouth daily       Labs reviewed in EPIC    ROS:  Constitutional, HEENT, cardiovascular, pulmonary, gi and gu systems are negative, except as otherwise noted.      OBJECTIVE:                                                    BP (!) 146/92 (BP Location: Right arm, Patient Position: Chair, Cuff Size: Adult Large)  Pulse 66  Temp 97  F (36.1  C)  Ht 5' 6\" (1.676 m)  Wt 205 lb 6 oz (93.2 kg)  SpO2 100%  BMI 33.15 kg/m2  Body mass index is 33.15 kg/(m^2).  GENERAL APPEARANCE: healthy, alert and no distress  RESP: lungs clear to auscultation - no rales, rhonchi or wheezes  CV: regular rates and rhythm, normal S1 S2, no S3 or S4 and no murmur, click or rub  PSYCH: mentation appears normal and affect normal/bright       ASSESSMENT/PLAN:                                                    1. MS (multiple sclerosis) (H)  Has now started on medications  - baclofen (LIORESAL) 20 MG tablet; Take 1 tablet (20 mg) by mouth 2 times daily  Dispense: 30 tablet; Refill: 3  - teriflunomide (AUBAGIO) 14 MG tablet; Take 1 tablet (14 mg) by mouth daily Only available through select specialty pharmacies    2. Essential hypertension, benign      3. Benign essential hypertension  Increase to 75mg and f/u 6 wks  - metoprolol (TOPROL-XL) 25 MG 24 hr tablet; Take 1 tablet (25 mg) by mouth daily Take in addition to 50mg tablet daily  Dispense: 30 tablet; Refill: 1  - metoprolol (TOPROL-XL) 50 MG 24 hr tablet; Take 1 tablet (50 mg) by mouth daily Take in addition to 25 mg tablet daily  Dispense: 90 tablet; Refill: 1    Patient was agreeable to this plan and had no further questions.  See Patient Instructions    Yudy Hsu MD  [facility] [place]    "

## 2017-09-15 ASSESSMENT — ASTHMA QUESTIONNAIRES: ACT_TOTALSCORE: 25

## 2017-10-03 DIAGNOSIS — G35 MS (MULTIPLE SCLEROSIS) (H): ICD-10-CM

## 2017-10-03 DIAGNOSIS — Z79.899 DRUG THERAPY: Primary | ICD-10-CM

## 2017-10-03 LAB
ALBUMIN SERPL-MCNC: 3.7 G/DL (ref 3.4–5)
ALP SERPL-CCNC: 100 U/L (ref 40–150)
ALT SERPL W P-5'-P-CCNC: 33 U/L (ref 0–50)
AST SERPL W P-5'-P-CCNC: 22 U/L (ref 0–45)
BASOPHILS # BLD AUTO: 0.1 10E9/L (ref 0–0.2)
BASOPHILS NFR BLD AUTO: 0.8 %
BILIRUB DIRECT SERPL-MCNC: <0.1 MG/DL (ref 0–0.2)
BILIRUB SERPL-MCNC: 0.2 MG/DL (ref 0.2–1.3)
DIFFERENTIAL METHOD BLD: NORMAL
EOSINOPHIL # BLD AUTO: 0.1 10E9/L (ref 0–0.7)
EOSINOPHIL NFR BLD AUTO: 1.9 %
ERYTHROCYTE [DISTWIDTH] IN BLOOD BY AUTOMATED COUNT: 12.3 % (ref 10–15)
HCT VFR BLD AUTO: 41 % (ref 35–47)
HGB BLD-MCNC: 13.6 G/DL (ref 11.7–15.7)
IMM GRANULOCYTES # BLD: 0 10E9/L (ref 0–0.4)
IMM GRANULOCYTES NFR BLD: 0.3 %
LYMPHOCYTES # BLD AUTO: 1.8 10E9/L (ref 0.8–5.3)
LYMPHOCYTES NFR BLD AUTO: 28.5 %
MCH RBC QN AUTO: 29.6 PG (ref 26.5–33)
MCHC RBC AUTO-ENTMCNC: 33.2 G/DL (ref 31.5–36.5)
MCV RBC AUTO: 89 FL (ref 78–100)
MONOCYTES # BLD AUTO: 0.6 10E9/L (ref 0–1.3)
MONOCYTES NFR BLD AUTO: 9.3 %
NEUTROPHILS # BLD AUTO: 3.8 10E9/L (ref 1.6–8.3)
NEUTROPHILS NFR BLD AUTO: 59.2 %
NRBC # BLD AUTO: 0 10*3/UL
NRBC BLD AUTO-RTO: 0 /100
PLATELET # BLD AUTO: 256 10E9/L (ref 150–450)
PROT SERPL-MCNC: 7.5 G/DL (ref 6.8–8.8)
RBC # BLD AUTO: 4.59 10E12/L (ref 3.8–5.2)
WBC # BLD AUTO: 6.5 10E9/L (ref 4–11)

## 2017-10-03 PROCEDURE — 80076 HEPATIC FUNCTION PANEL: CPT | Performed by: PSYCHIATRY & NEUROLOGY

## 2017-10-03 PROCEDURE — 36415 COLL VENOUS BLD VENIPUNCTURE: CPT | Performed by: PSYCHIATRY & NEUROLOGY

## 2017-10-03 PROCEDURE — 85025 COMPLETE CBC W/AUTO DIFF WBC: CPT | Performed by: PSYCHIATRY & NEUROLOGY

## 2017-10-13 ENCOUNTER — TELEPHONE (OUTPATIENT)
Dept: FAMILY MEDICINE | Facility: OTHER | Age: 52
End: 2017-10-13

## 2017-10-13 NOTE — TELEPHONE ENCOUNTER
7:34 AM    Reason for Call: Phone Call    Description: Pt called and stated she through her back out and is wondering what meds she can use for this? Please call her back at [phone]      Was an appointment offered for this call? No, pt stated she is unable to come into clinic  If yes : Appointment type              Date    Preferred method for responding to this message: Telephone Call  What is your phone number ?    If we cannot reach you directly, may we leave a detailed response at the number you provided? Yes    Can this message wait until your PCP/provider returns, if available today? Not applicable    Yvonne Navarro

## 2017-10-26 ENCOUNTER — OFFICE VISIT (OUTPATIENT)
Dept: FAMILY MEDICINE | Facility: OTHER | Age: 52
End: 2017-10-26
Attending: FAMILY MEDICINE
Payer: COMMERCIAL

## 2017-10-26 VITALS
WEIGHT: 204.6 LBS | HEART RATE: 62 BPM | SYSTOLIC BLOOD PRESSURE: 142 MMHG | OXYGEN SATURATION: 98 % | DIASTOLIC BLOOD PRESSURE: 82 MMHG | BODY MASS INDEX: 32.88 KG/M2 | HEIGHT: 66 IN | TEMPERATURE: 97.6 F

## 2017-10-26 DIAGNOSIS — I10 BENIGN ESSENTIAL HYPERTENSION: Primary | ICD-10-CM

## 2017-10-26 PROCEDURE — 99213 OFFICE O/P EST LOW 20 MIN: CPT | Performed by: FAMILY MEDICINE

## 2017-10-26 RX ORDER — METOPROLOL SUCCINATE 100 MG/1
100 TABLET, EXTENDED RELEASE ORAL DAILY
Qty: 90 TABLET | Refills: 1 | Status: SHIPPED | OUTPATIENT
Start: 2017-10-26 | End: 2018-04-16

## 2017-10-26 ASSESSMENT — PAIN SCALES - GENERAL: PAINLEVEL: MODERATE PAIN (5)

## 2017-10-26 NOTE — MR AVS SNAPSHOT
After Visit Summary   10/26/2017    Yvonne Llanos    MRN: [id number]           Patient Information     Date Of Birth          1965        Visit Information        Provider Department      10/26/2017 8:45 AM Yudy Hsu MD [facility]        Today's Diagnoses     Benign essential hypertension    -  1       Follow-ups after your visit        Your next 10 appointments already scheduled     Nov 20, 2017 10:30 AM CST   (Arrive by 10:15 AM)   Office Visit with Cholo Adame MD   [facility]    [address]  [facility] [address]   [phone]           Bring a current list of meds and any records pertaining to this visit.  For Physicals, please bring immunization records and any forms needing to be filled out.  Please arrive 15 minutes early to complete paperwork and register.              Who to contact     If you have questions or need follow up information about today's clinic visit or your schedule please contact [facility] directly at [phone].  Normal or non-critical lab and imaging results will be communicated to you by Nu-Pulsehart, letter or phone within 4 business days after the clinic has received the results. If you do not hear from us within 7 days, please contact the clinic through Nu-Pulsehart or phone. If you have a critical or abnormal lab result, we will notify you by phone as soon as possible.  Submit refill requests through Escapia or call your pharmacy and they will forward the refill request to us. Please allow 3 business days for your refill to be completed.          Additional Information About Your Visit        Nu-Pulsehart Information     [employer]ia gives you secure access to your electronic health record. If you see a primary care provider, you can also send messages to your care team and make appointments. If you have questions, please call your primary care clinic.  If you do not have a primary  "care provider, please call [phone] and they will assist you.        Care EveryWhere ID     This is your Care EveryWhere ID. This could be used by other organizations to access your [facility] medical records  [id number]        Your Vitals Were     Pulse Temperature Height Pulse Oximetry BMI (Body Mass Index)       62 97.6  F (36.4  C) 5' 6\" (1.676 m) 98% 33.02 kg/m2        Blood Pressure from Last 3 Encounters:   10/26/17 142/82   09/14/17 (!) 146/92   05/04/17 123/72    Weight from Last 3 Encounters:   10/26/17 204 lb 9.6 oz (92.8 kg)   09/14/17 205 lb 6 oz (93.2 kg)   05/04/17 194 lb (88 kg)              We Performed the Following     Basic metabolic panel          Today's Medication Changes          These changes are accurate as of: 10/26/17  9:25 AM.  If you have any questions, ask your nurse or doctor.               These medicines have changed or have updated prescriptions.        Dose/Directions    metoprolol 100 MG 24 hr tablet   Commonly known as:  TOPROL-XL   This may have changed:    - medication strength  - how much to take  - additional instructions  - Another medication with the same name was removed. Continue taking this medication, and follow the directions you see here.   Used for:  Benign essential hypertension   Changed by:  Yudy Hsu MD        Dose:  100 mg   Take 1 tablet (100 mg) by mouth daily   Quantity:  90 tablet   Refills:  1            Where to get your medicines      These medications were sent to [id number]  [facility]STIE [facility]ILKS - [address] AT [facility] [address], [place] [address]     Phone:  [phone]     metoprolol 100 MG 24 hr tablet                Primary Care Provider Office Phone # Fax #    Yudy Hsu [phone]       [facility] [place] [address]  [place] [address]        Equal Access to Services     [facility] SHIN AH: Hadii rodrigue busch hadasho Soomaali, waaxda luqadaha, qaybta kaalmada dio, " rufino ricemagdalean keyes'aan ah. So [facility] [phone].    ATENCIÓN: Si michaella stiven, tiene a moya disposición servicios gratuitos de asistencia lingüística. Efren blanca [phone].    We comply with applicable federal civil rights laws and [place] laws. We do not discriminate on the basis of race, color, national origin, age, disability, sex, sexual orientation, or gender identity.            Thank you!     Thank you for choosing [facility]  [facility] your [facility]. Our goal is always to provide you with excellent care. Hearing back from our patients is one way we can continue to improve our services. Please take a few minutes to complete the written survey that you may receive in the mail after your visit with us. Thank you!             Your Updated Medication List - Protect others around you: Learn how to safely use, store and throw away your medicines at www.disposemymeds.org.          This list is accurate as of: 10/26/17  9:25 AM.  Always use your most recent med list.                   Brand Name Dispense Instructions for use Diagnosis    AUBAGIO 14 MG tablet   Generic drug:  teriflunomide      Take 1 tablet (14 mg) by mouth daily Only available through select specialty pharmacies    MS (multiple sclerosis) (H)       baclofen 20 MG tablet    LIORESAL    30 tablet    Take 1 tablet (20 mg) by mouth 2 times daily    MS (multiple sclerosis) (H)       butalbital-acetaminophen-caffeine -40 MG per tablet    FIORICET/ESGIC    36 tablet    TAKE 1 TABLET BY MOUTH THREE TIMES WEEKLY AS NEEDED FOR HEADACHE    Other migraine without status migrainosus, not intractable       cholecalciferol 5000 UNITS Caps capsule    vitamin D3     Take 1 capsule (5,000 Units) by mouth daily        folic acid-vit B6-vit B12 0.8-10-0.115 MG Tabs per tablet    FOLGARD     Take 1 tablet by mouth daily        gabapentin 400 MG capsule    NEURONTIN    360 capsule    Take 1 capsule (400 mg) by mouth 3 times daily    MS  (multiple sclerosis) (H)       losartan 100 MG tablet    COZAAR    90 tablet    TAKE 1 TABLET BY MOUTH ONCE DAILY.    Essential hypertension, benign       metoprolol 100 MG 24 hr tablet    TOPROL-XL    90 tablet    Take 1 tablet (100 mg) by mouth daily    Benign essential hypertension       montelukast 10 MG tablet    SINGULAIR    30 tablet    TAKE 1 TABLET BY MOUTH EVERY EVENING    Seasonal allergic rhinitis, unspecified allergic rhinitis trigger       MULTIVITAMIN GUMMIES ADULT Chew     30 tablet    Take 2 tablets by mouth daily    Weight loss       PRILOSEC OTC PO      Take 20 mg by mouth daily    Benign essential hypertension

## 2017-10-26 NOTE — PROGRESS NOTES
SUBJECTIVE:   Yvonne Llanos is a 51 year old female who presents to clinic today for the following health issues:      Hypertension Follow-up      Outpatient blood pressures are not being checked.    Low Salt Diet: low salt        Amount of exercise or physical activity: 4-5 days/week for an average of 15-30 minutes    Problems taking medications regularly: No    Medication side effects: Aubagio gives upset stomach, taking OTC Prilosec for the last 2 weeks    Diet: low salt    Problem list and histories reviewed & adjusted, as indicated.  Additional history: as documented    Current Outpatient Prescriptions   Medication Sig Dispense Refill     Omeprazole Magnesium (PRILOSEC OTC PO) Take 20 mg by mouth daily       metoprolol (TOPROL-XL) 100 MG 24 hr tablet Take 1 tablet (100 mg) by mouth daily 90 tablet 1     baclofen (LIORESAL) 20 MG tablet Take 1 tablet (20 mg) by mouth 2 times daily 30 tablet 3     teriflunomide (AUBAGIO) 14 MG tablet Take 1 tablet (14 mg) by mouth daily Only available through select specialty pharmacies       losartan (COZAAR) 100 MG tablet TAKE 1 TABLET BY MOUTH ONCE DAILY. 90 tablet 3     butalbital-acetaminophen-caffeine (FIORICET/ESGIC) -40 MG per tablet TAKE 1 TABLET BY MOUTH THREE TIMES WEEKLY AS NEEDED FOR HEADACHE 36 tablet 3     montelukast (SINGULAIR) 10 MG tablet TAKE 1 TABLET BY MOUTH EVERY EVENING 30 tablet 3     cholecalciferol (VITAMIN D3) 5000 UNITS CAPS capsule Take 1 capsule (5,000 Units) by mouth daily  0     folic acid-vit B6-vit B12 (FOLGARD) 0.8-10-0.115 MG TABS Take 1 tablet by mouth daily       Multiple Vitamins-Minerals (MULTIVITAMIN GUMMIES ADULT) CHEW Take 2 tablets by mouth daily 30 tablet 0     [DISCONTINUED] metoprolol (TOPROL-XL) 25 MG 24 hr tablet Take 1 tablet (25 mg) by mouth daily Take in addition to 50mg tablet daily 30 tablet 1     [DISCONTINUED] metoprolol (TOPROL-XL) 50 MG 24 hr tablet Take 1 tablet (50 mg) by mouth daily Take in addition to 25 mg  "tablet daily 90 tablet 1     gabapentin (NEURONTIN) 400 MG capsule Take 1 capsule (400 mg) by mouth 3 times daily 360 capsule 3     Labs reviewed in EPIC    Reviewed and updated as needed this visit by clinical staff     Reviewed and updated as needed this visit by Provider         ROS:  Constitutional, HEENT, cardiovascular, pulmonary, gi and gu systems are negative, except as otherwise noted.      OBJECTIVE:                                                    /82 (BP Location: Left arm, Patient Position: Sitting, Cuff Size: Adult Large)  Pulse 62  Temp 97.6  F (36.4  C)  Ht 5' 6\" (1.676 m)  Wt 204 lb 9.6 oz (92.8 kg)  SpO2 98%  BMI 33.02 kg/m2  Body mass index is 33.02 kg/(m^2).  GENERAL APPEARANCE: healthy, alert and no distress  RESP: lungs clear to auscultation - no rales, rhonchi or wheezes  CV: regular rates and rhythm, normal S1 S2, no S3 or S4 and no murmur, click or rub  PSYCH: mentation appears normal and affect normal/bright       ASSESSMENT/PLAN:                                                    1. Benign essential hypertension  F/u 6-7 wks with new dose 100mg  - Omeprazole Magnesium (PRILOSEC OTC PO); Take 20 mg by mouth daily  - metoprolol (TOPROL-XL) 100 MG 24 hr tablet; Take 1 tablet (100 mg) by mouth daily  Dispense: 90 tablet; Refill: 1  - Basic metabolic panel    Patient was agreeable to this plan and had no further questions.  See Patient Instructions    Yudy Hsu MD  [facility] [place]  "

## 2017-10-26 NOTE — NURSING NOTE
"Chief Complaint   Patient presents with     Hypertension       Initial /82 (BP Location: Left arm, Patient Position: Sitting, Cuff Size: Adult Large)  Pulse 62  Temp 97.6  F (36.4  C)  Ht 5' 6\" (1.676 m)  Wt 204 lb 9.6 oz (92.8 kg)  SpO2 98%  BMI 33.02 kg/m2 Estimated body mass index is 33.02 kg/(m^2) as calculated from the following:    Height as of this encounter: 5' 6\" (1.676 m).    Weight as of this encounter: 204 lb 9.6 oz (92.8 kg).  Medication Reconciliation: complete     Raisa Herrera    "

## 2017-11-09 DIAGNOSIS — Z79.899 DRUG THERAPY: ICD-10-CM

## 2017-11-09 DIAGNOSIS — G35 MS (MULTIPLE SCLEROSIS) (H): Primary | ICD-10-CM

## 2017-11-09 DIAGNOSIS — I10 BENIGN ESSENTIAL HYPERTENSION: ICD-10-CM

## 2017-11-09 LAB
ALBUMIN SERPL-MCNC: 3.6 G/DL (ref 3.4–5)
ALP SERPL-CCNC: 102 U/L (ref 40–150)
ALT SERPL W P-5'-P-CCNC: 40 U/L (ref 0–50)
ANION GAP SERPL CALCULATED.3IONS-SCNC: 8 MMOL/L (ref 3–14)
AST SERPL W P-5'-P-CCNC: 24 U/L (ref 0–45)
BASOPHILS # BLD AUTO: 0.1 10E9/L (ref 0–0.2)
BASOPHILS NFR BLD AUTO: 1.1 %
BILIRUB DIRECT SERPL-MCNC: <0.1 MG/DL (ref 0–0.2)
BILIRUB SERPL-MCNC: 0.2 MG/DL (ref 0.2–1.3)
BUN SERPL-MCNC: 15 MG/DL (ref 7–30)
CALCIUM SERPL-MCNC: 9 MG/DL (ref 8.5–10.1)
CHLORIDE SERPL-SCNC: 106 MMOL/L (ref 94–109)
CO2 SERPL-SCNC: 28 MMOL/L (ref 20–32)
CREAT SERPL-MCNC: 0.63 MG/DL (ref 0.52–1.04)
DIFFERENTIAL METHOD BLD: NORMAL
EOSINOPHIL # BLD AUTO: 0.1 10E9/L (ref 0–0.7)
EOSINOPHIL NFR BLD AUTO: 1.6 %
ERYTHROCYTE [DISTWIDTH] IN BLOOD BY AUTOMATED COUNT: 12.5 % (ref 10–15)
GFR SERPL CREATININE-BSD FRML MDRD: >90 ML/MIN/1.7M2
GLUCOSE SERPL-MCNC: 119 MG/DL (ref 70–99)
HCT VFR BLD AUTO: 40.7 % (ref 35–47)
HGB BLD-MCNC: 13.4 G/DL (ref 11.7–15.7)
IMM GRANULOCYTES # BLD: 0 10E9/L (ref 0–0.4)
IMM GRANULOCYTES NFR BLD: 0.3 %
LYMPHOCYTES # BLD AUTO: 1.7 10E9/L (ref 0.8–5.3)
LYMPHOCYTES NFR BLD AUTO: 26.8 %
MCH RBC QN AUTO: 29.1 PG (ref 26.5–33)
MCHC RBC AUTO-ENTMCNC: 32.9 G/DL (ref 31.5–36.5)
MCV RBC AUTO: 88 FL (ref 78–100)
MONOCYTES # BLD AUTO: 0.5 10E9/L (ref 0–1.3)
MONOCYTES NFR BLD AUTO: 8.6 %
NEUTROPHILS # BLD AUTO: 3.9 10E9/L (ref 1.6–8.3)
NEUTROPHILS NFR BLD AUTO: 61.6 %
NRBC # BLD AUTO: 0 10*3/UL
NRBC BLD AUTO-RTO: 0 /100
PLATELET # BLD AUTO: 228 10E9/L (ref 150–450)
POTASSIUM SERPL-SCNC: 3.9 MMOL/L (ref 3.4–5.3)
PROT SERPL-MCNC: 7.3 G/DL (ref 6.8–8.8)
RBC # BLD AUTO: 4.61 10E12/L (ref 3.8–5.2)
SODIUM SERPL-SCNC: 142 MMOL/L (ref 133–144)
WBC # BLD AUTO: 6.3 10E9/L (ref 4–11)

## 2017-11-09 PROCEDURE — 36415 COLL VENOUS BLD VENIPUNCTURE: CPT | Performed by: PSYCHIATRY & NEUROLOGY

## 2017-11-09 PROCEDURE — 85025 COMPLETE CBC W/AUTO DIFF WBC: CPT | Performed by: PSYCHIATRY & NEUROLOGY

## 2017-11-09 PROCEDURE — 80076 HEPATIC FUNCTION PANEL: CPT | Performed by: PSYCHIATRY & NEUROLOGY

## 2017-11-09 PROCEDURE — 80048 BASIC METABOLIC PNL TOTAL CA: CPT | Performed by: PSYCHIATRY & NEUROLOGY

## 2017-11-20 ENCOUNTER — OFFICE VISIT (OUTPATIENT)
Dept: FAMILY MEDICINE | Facility: OTHER | Age: 52
End: 2017-11-20
Attending: FAMILY MEDICINE
Payer: COMMERCIAL

## 2017-11-20 VITALS
HEIGHT: 66 IN | WEIGHT: 200 LBS | DIASTOLIC BLOOD PRESSURE: 74 MMHG | TEMPERATURE: 98.1 F | SYSTOLIC BLOOD PRESSURE: 100 MMHG | BODY MASS INDEX: 32.14 KG/M2 | HEART RATE: 78 BPM | OXYGEN SATURATION: 95 % | RESPIRATION RATE: 16 BRPM

## 2017-11-20 DIAGNOSIS — I10 ESSENTIAL HYPERTENSION, BENIGN: ICD-10-CM

## 2017-11-20 DIAGNOSIS — G35 MS (MULTIPLE SCLEROSIS) (H): Primary | ICD-10-CM

## 2017-11-20 DIAGNOSIS — K21.9 GASTROESOPHAGEAL REFLUX DISEASE WITHOUT ESOPHAGITIS: ICD-10-CM

## 2017-11-20 PROCEDURE — 99214 OFFICE O/P EST MOD 30 MIN: CPT | Performed by: FAMILY MEDICINE

## 2017-11-20 ASSESSMENT — PAIN SCALES - GENERAL: PAINLEVEL: MODERATE PAIN (4)

## 2017-11-20 NOTE — MR AVS SNAPSHOT
After Visit Summary   11/20/2017    Yvonne Llanos    MRN: [id number]           Patient Information     Date Of Birth          1965        Visit Information        Provider Department      11/20/2017 10:30 AM Cholo Adame MD [facility]        Today's Diagnoses     MS (multiple sclerosis) (H)    -  1    Gastroesophageal reflux disease without esophagitis        Essential hypertension, benign          Care Instructions    F/u with ongoing concerns.           Follow-ups after your visit        Who to contact     If you have questions or need follow up information about today's clinic visit or your schedule please contact [facility] directly at [phone].  Normal or non-critical lab and imaging results will be communicated to you by MyChart, letter or phone within 4 business days after the clinic has received the results. If you do not hear from us within 7 days, please contact the clinic through [employer] or phone. If you have a critical or abnormal lab result, we will notify you by phone as soon as possible.  Submit refill requests through [facility] or call your pharmacy and they will forward the refill request to us. Please allow 3 business days for your refill to be completed.          Additional Information About Your Visit        MyChart Information     [facility] gives you secure access to your electronic health record. If you see a primary care provider, you can also send messages to your care team and make appointments. If you have questions, please call your primary care clinic.  If you do not have a primary care provider, please call [phone] and they will assist you.        Care EveryWhere ID     This is your Care EveryWhere ID. This could be used by other organizations to access your [facility] medical records  [id number]        Your Vitals Were     Pulse Temperature Respirations Height Pulse Oximetry BMI (Body Mass Index)    78 98.1  F (36.7  C)  "(Tympanic) 16 5' 6\" (1.676 m) 95% 32.28 kg/m2       Blood Pressure from Last 3 Encounters:   11/20/17 100/74   10/26/17 142/82   09/14/17 (!) 146/92    Weight from Last 3 Encounters:   11/20/17 200 lb (90.7 kg)   10/26/17 204 lb 9.6 oz (92.8 kg)   09/14/17 205 lb 6 oz (93.2 kg)              Today, you had the following     No orders found for display       Primary Care Provider Office Phone # Fax #    Yudycollin Hsu [phone]       [facility] [place] [address]  [place] [address]        Equal Access to Services     AUSTEN MELISSA : Hadii aad ku hadasho Soomaali, waaxda luqadaha, qaybta kaalmada adeegyada, waxay idiin haysharonn nichole skaggs . So [facility] [phone].    ATENCIÓN: Si habla español, tiene a moya disposición servicios gratuitos de asistencia lingüística. Llame al [phone].    We comply with applicable federal civil rights laws and [place] laws. We do not discriminate on the basis of race, color, national origin, age, disability, sex, sexual orientation, or gender identity.            Thank you!     Thank you for choosing [facility]  for your care. Our goal is always to provide you with excellent care. Hearing back from our patients is one way we can continue to improve our services. Please take a few minutes to complete the written survey that you may receive in the mail after your visit with us. Thank you!             Your Updated Medication List - Protect others around you: Learn how to safely use, store and throw away your medicines at www.disposemymeds.org.          This list is accurate as of: 11/20/17  1:06 PM.  Always use your most recent med list.                   Brand Name Dispense Instructions for use Diagnosis    AUBAGIO 14 MG tablet   Generic drug:  teriflunomide      Take 1 tablet (14 mg) by mouth daily Only available through select specialty pharmacies    MS (multiple sclerosis) (H)       baclofen 20 MG tablet    LIORESAL    30 tablet "    Take 1 tablet (20 mg) by mouth 2 times daily    MS (multiple sclerosis) (H)       butalbital-acetaminophen-caffeine -40 MG per tablet    FIORICET/ESGIC    36 tablet    TAKE 1 TABLET BY MOUTH THREE TIMES WEEKLY AS NEEDED FOR HEADACHE    Other migraine without status migrainosus, not intractable       cholecalciferol 5000 UNITS Caps capsule    vitamin D3     Take 1 capsule (5,000 Units) by mouth daily        folic acid-vit B6-vit B12 0.8-10-0.115 MG Tabs per tablet    FOLGARD     Take 1 tablet by mouth daily        gabapentin 400 MG capsule    NEURONTIN    360 capsule    Take 1 capsule (400 mg) by mouth 3 times daily    MS (multiple sclerosis) (H)       losartan 100 MG tablet    COZAAR    90 tablet    TAKE 1 TABLET BY MOUTH ONCE DAILY.    Essential hypertension, benign       metoprolol 100 MG 24 hr tablet    TOPROL-XL    90 tablet    Take 1 tablet (100 mg) by mouth daily    Benign essential hypertension       montelukast 10 MG tablet    SINGULAIR    30 tablet    TAKE 1 TABLET BY MOUTH EVERY EVENING    Seasonal allergic rhinitis, unspecified allergic rhinitis trigger       MULTIVITAMIN GUMMIES ADULT Chew     30 tablet    Take 2 tablets by mouth daily    Weight loss       PRILOSEC OTC PO      Take 20 mg by mouth daily    Benign essential hypertension

## 2017-11-20 NOTE — NURSING NOTE
"Chief Complaint   Patient presents with     Establish Care     Pt is in to establish care.       Initial /74 (BP Location: Left arm, Patient Position: Chair, Cuff Size: Adult Large)  Pulse 78  Temp 98.1  F (36.7  C) (Tympanic)  Resp 16  Ht 5' 6\" (1.676 m)  Wt 200 lb (90.7 kg)  SpO2 95%  BMI 32.28 kg/m2 Estimated body mass index is 32.28 kg/(m^2) as calculated from the following:    Height as of this encounter: 5' 6\" (1.676 m).    Weight as of this encounter: 200 lb (90.7 kg).  Medication Reconciliation: complete   Destiny Gaxiola    "

## 2017-11-20 NOTE — PROGRESS NOTES
Yvonne Llanos    2017    Chief Complaint   Patient presents with     Establish Care     Pt is in to establish care.       SUBJECTIVE:  Here for establish care.  Doing fine right now.  We reviewed past history in detail and talked about elements of same.  Reviewed meds and maintenance.  See below.  No new actions taken other than weening her PPI.      Past Medical History:   Diagnosis Date     Asthma     mild[place]      Dyslipidemia 2011     Endometriosis      Fibromyalgia      Hypertension 2011     Migraines      Other abnormal glucose 2011     Pancreatitis due to biliary obstruction     improved after cholecystectomy     Seasonal Allergies 2011     Sleep apnea     on CPAP pressure of 8, resolved s/p  gastric sleeve       Past Surgical History:   Procedure Laterality Date      x2       CHOLECYSTECTOMY       Hysterectomy with BSO      Endometriosis     LAPAROSCOPIC GASTRIC SLEEVE  2014    jun     LAPAROSCOPY      D&C, exploratory,  placental tissue adhered to uterus     rotator cuff tendon surgery Left      [place]       Current Outpatient Prescriptions   Medication Sig Dispense Refill     Omeprazole Magnesium (PRILOSEC OTC PO) Take 20 mg by mouth daily       metoprolol (TOPROL-XL) 100 MG 24 hr tablet Take 1 tablet (100 mg) by mouth daily 90 tablet 1     baclofen (LIORESAL) 20 MG tablet Take 1 tablet (20 mg) by mouth 2 times daily 30 tablet 3     teriflunomide (AUBAGIO) 14 MG tablet Take 1 tablet (14 mg) by mouth daily Only available through select specialty pharmacies       losartan (COZAAR) 100 MG tablet TAKE 1 TABLET BY MOUTH ONCE DAILY. 90 tablet 3     butalbital-acetaminophen-caffeine (FIORICET/ESGIC) -40 MG per tablet TAKE 1 TABLET BY MOUTH THREE TIMES WEEKLY AS NEEDED FOR HEADACHE 36 tablet 3     montelukast (SINGULAIR) 10 MG tablet TAKE 1 TABLET BY MOUTH EVERY EVENING 30 tablet 3      cholecalciferol (VITAMIN D3) 5000 UNITS CAPS capsule Take 1 capsule (5,000 Units) by mouth daily  0     folic acid-vit B6-vit B12 (FOLGARD) 0.8-10-0.115 MG TABS Take 1 tablet by mouth daily       Multiple Vitamins-Minerals (MULTIVITAMIN GUMMIES ADULT) CHEW Take 2 tablets by mouth daily 30 tablet 0     gabapentin (NEURONTIN) 400 MG capsule Take 1 capsule (400 mg) by mouth 3 times daily 360 capsule 3       Allergies   Allergen Reactions     Ace Inhibitors Cough     Amitriptyline Other (See Comments) and Nausea     Nightmares     Flagyl [Metronidazole] Nausea     Morphine Nausea     Sulfa Drugs        Family History   Problem Relation Age of Onset     Hypertension Mother      Heart Failure Mother      Congestive     Other - See Comments Mother      marcos danlos??  hypermobility     Lipids Father      Hyperlipidemia     CANCER Father      Skin     Hypertension Father      Prostate Cancer Father      Suicide Maternal Grandmother      DIABETES Maternal Grandfather      CLOTTING DISORDER Maternal Grandfather      DIABETES Paternal Grandmother      KIDNEY DISEASE Paternal Grandmother      Other - See Comments Paternal Grandfather      old age     Depression Brother      DIABETES Brother      type 2     Myocardial Infarction Brother 32     ETOHic, +tobacco     Peripheral Vascular Disease Brother      Hyperlipidemia Brother      Other - See Comments Sister      Post Partum DVT     Other Cancer Sister      retinal tear     Known Genetic Syndrome Daughter      marcos danlos     Known Genetic Syndrome Daughter      marcos rehanas       Social History     Social History     Marital status:      Spouse name: Scooter     Number of children: 3     Years of education: 16     Occupational History      [employer]     FULL-TIME     Social History Main Topics     Smoking status: Never Smoker     Smokeless tobacco: Never Used     Alcohol use 0.0 oz/week     0 Standard drinks or equivalent per week       Comment: rarely     Drug use: No     Sexual activity: Yes     Partners: Male     Other Topics Concern      Service No     Blood Transfusions Yes     PERMITS     Caffeine Concern Yes     1-2 coffee/day     Exercise Yes     walking 20 min daily     Seat Belt Yes     Self-Exams Yes     Social History Narrative     -- none    Exercise -- walking 30 min daily    Caffeine -- 1 coffee/daily       5 point ROS negative except as noted above in HPI, including Gen., Resp., CV, GI &  system review.     OBJECTIVE:  B/P: 100/74, T: 98.1, P: 78, R: 16    GENERAL APPEARANCE: Alert, no acute distress  CV: regular rate and rhythm, no murmur, rub or gallop  RESP: lungs clear to auscultation bilaterally  ABDOMEN: normal bowel sounds, soft, nontender, no hepatosplenomegaly or other masses  SKIN: no suspicious lesions or rashes to visualized skin  NEURO: Alert, oriented x 3, speech and mentation normal    ASSESSMENT and PLAN:  (G35) MS (multiple sclerosis) (H)  (primary encounter diagnosis)  Comment: stable.   Plan: follows with neurology.  No change      (K21.9) Gastroesophageal reflux disease without esophagitis  Comment: reviewed.    Plan: she is on the prilosec.  We talked about weening it.     (I10) Essential hypertension, benign  Comment: reviewed.   Plan: stable.     Advised on cpe at her convenience.  Reviewed labs and UTD until May of 2018.  Due for FIT test at that time as well.  No actions today as she is UTD on everything else.

## 2017-11-26 ENCOUNTER — HOSPITAL ENCOUNTER (EMERGENCY)
Facility: HOSPITAL | Age: 52
Discharge: HOME | End: 2017-11-26
Attending: NURSE PRACTITIONER | Admitting: NURSE PRACTITIONER
Payer: COMMERCIAL

## 2017-11-26 ENCOUNTER — HEALTH MAINTENANCE LETTER (OUTPATIENT)
Age: 52
End: 2017-11-26

## 2017-11-26 VITALS
TEMPERATURE: 98.9 F | RESPIRATION RATE: 18 BRPM | SYSTOLIC BLOOD PRESSURE: 184 MMHG | OXYGEN SATURATION: 97 % | DIASTOLIC BLOOD PRESSURE: 104 MMHG | HEART RATE: 82 BPM

## 2017-11-26 DIAGNOSIS — J40 BRONCHITIS: ICD-10-CM

## 2017-11-26 PROCEDURE — 99213 OFFICE O/P EST LOW 20 MIN: CPT

## 2017-11-26 PROCEDURE — 99213 OFFICE O/P EST LOW 20 MIN: CPT | Performed by: NURSE PRACTITIONER

## 2017-11-26 RX ORDER — AZITHROMYCIN 250 MG/1
TABLET, FILM COATED ORAL
Qty: 6 TABLET | Refills: 0 | Status: SHIPPED | OUTPATIENT
Start: 2017-11-26 | End: 2017-12-01

## 2017-11-26 ASSESSMENT — ENCOUNTER SYMPTOMS
WHEEZING: 0
SHORTNESS OF BREATH: 0
APPETITE CHANGE: 0
FEVER: 0
ABDOMINAL PAIN: 0
COUGH: 1
RHINORRHEA: 0
SORE THROAT: 0

## 2017-11-26 NOTE — ED AVS SNAPSHOT
[facility]    [address] [place] [id number]    Phone:  [phone]                                       Yvonne Llanos   MRN: [id number]    Department:  [facility]   Date of Visit:  11/26/2017           After Visit Summary Signature Page     I have received my discharge instructions, and my questions have been answered. I have discussed any challenges I see with this plan with the nurse or doctor.    ..........................................................................................................................................  Patient/Patient Representative Signature      ..........................................................................................................................................  Patient Representative Print Name and Relationship to Patient    ..................................................               ................................................  Date                                            Time    ..........................................................................................................................................  Reviewed by Signature/Title    ...................................................              ..............................................  Date                                                            Time

## 2017-11-26 NOTE — ED PROVIDER NOTES
History     Chief Complaint   Patient presents with     Cough     was in [place] two weeks ago     Generalized Body Aches     The history is provided by the patient. No  was used.     Yvonne Llanos is a 52 year old female who presents with a cough for 2 weeks, body aches for the past few days. Taking OTC medications. Minimal sinus congestion.   Eating okay, cold medications are diminishing her appetite. No sore throat. Head hurts when she coughs. History of MS, has recently traveled to [facility].     Problem List:    Patient Active Problem List    Diagnosis Date Noted     MS (multiple sclerosis) (H) 2017     Priority: Medium     Rotator cuff tendonitis, left 2016     Priority: Medium     Essential hypertension, benign 2014     Priority: Medium     Hyperlipidemia LDL goal <130 2013     Priority: Medium     Insulin resistance 2013     Priority: Medium        Past Medical History:    Past Medical History:   Diagnosis Date     Asthma      Dyslipidemia 2011     Endometriosis      Fibromyalgia      Hypertension 2011     Migraines      Other abnormal glucose 2011     Pancreatitis due to biliary obstruction      Seasonal Allergies 2011     Sleep apnea        Past Surgical History:    Past Surgical History:   Procedure Laterality Date      x2       CHOLECYSTECTOMY       Hysterectomy with BSO      Endometriosis     LAPAROSCOPIC GASTRIC SLEEVE  2014    [place]     LAPAROSCOPY      D&C, exploratory,  placental tissue adhered to uterus     rotator cuff tendon surgery Left      [facility]       Family History:    Family History   Problem Relation Age of Onset     Hypertension Mother      Heart Failure Mother      Congestive     Other - See Comments Mother      marcos danlos??  hypermobility     Lipids Father      Hyperlipidemia     CANCER Father      Skin     Hypertension Father      Prostate Cancer Father      Suicide Maternal Grandmother       DIABETES Maternal Grandfather      CLOTTING DISORDER Maternal Grandfather      DIABETES Paternal Grandmother      KIDNEY DISEASE Paternal Grandmother      Other - See Comments Paternal Grandfather      old age     Depression Brother      DIABETES Brother      type 2     Myocardial Infarction Brother 32     ETOHic, +tobacco     Peripheral Vascular Disease Brother      Hyperlipidemia Brother      Other - See Comments Sister      Post Partum DVT     Other Cancer Sister      retinal tear     Known Genetic Syndrome Daughter      marcos mitchell     Known Genetic Syndrome Daughter      marcos mitchell       Social History:  Marital Status:   [2]  Social History   Substance Use Topics     Smoking status: Never Smoker     Smokeless tobacco: Never Used     Alcohol use 0.0 oz/week     0 Standard drinks or equivalent per week      Comment: rarely        Medications:      azithromycin (ZITHROMAX Z-ANATOLY) 250 MG tablet   Omeprazole Magnesium (PRILOSEC OTC PO)   metoprolol (TOPROL-XL) 100 MG 24 hr tablet   baclofen (LIORESAL) 20 MG tablet   teriflunomide (AUBAGIO) 14 MG tablet   losartan (COZAAR) 100 MG tablet   butalbital-acetaminophen-caffeine (FIORICET/ESGIC) -40 MG per tablet   montelukast (SINGULAIR) 10 MG tablet   cholecalciferol (VITAMIN D3) 5000 UNITS CAPS capsule   folic acid-vit B6-vit B12 (FOLGARD) 0.8-10-0.115 MG TABS   Multiple Vitamins-Minerals (MULTIVITAMIN GUMMIES ADULT) CHEW   gabapentin (NEURONTIN) 400 MG capsule         Review of Systems   Constitutional: Negative for appetite change and fever.   HENT: Positive for congestion. Negative for ear pain, rhinorrhea and sore throat.    Respiratory: Positive for cough. Negative for shortness of breath and wheezing.    Gastrointestinal: Negative for abdominal pain.       Physical Exam   BP: (!) 184/104  Pulse: 82  Temp: 98.9  F (37.2  C)  Resp: 18  SpO2: 97 %      Physical Exam   Constitutional: She is oriented to person, place, and time. She appears  well-developed and well-nourished. No distress.   HENT:   Head: Normocephalic and atraumatic.   Right Ear: External ear normal.   Left Ear: External ear normal.   Nose: Nose normal.   Mouth/Throat: Oropharynx is clear and moist. No oropharyngeal exudate.   Eyes: Conjunctivae are normal. No scleral icterus.   Neck: Normal range of motion. Neck supple.   Cardiovascular: Normal rate, regular rhythm and normal heart sounds.    Pulmonary/Chest: Effort normal and breath sounds normal. No respiratory distress. She has no wheezes.   Frequent dry cough   Musculoskeletal: Normal range of motion.   Neurological: She is alert and oriented to person, place, and time.   Skin: Skin is warm and dry. She is not diaphoretic.   Psychiatric: She has a normal mood and affect.   Nursing note and vitals reviewed.      ED Course     ED Course     Procedures    Labs Ordered and Resulted from Time of ED Arrival Up to the Time of Departure from the ED - No data to display    Assessments & Plan (with Medical Decision Making)     I have reviewed the nursing notes.  I have reviewed the findings, diagnosis, plan and need for follow up with the patient.  Will treat d/t duration of symptoms and health history.  Advised rest and fluids.   Follow up with PCP if not improving in 3 days.   Return here if sx worse.   Stop taking cold medication.      Discharge Medication List as of 11/26/2017  9:42 AM      START taking these medications    Details   azithromycin (ZITHROMAX Z-ANATOLY) 250 MG tablet Two tablets on the first day, then one tablet daily for the next 4 days, Disp-6 tablet, R-0, E-Prescribe             Final diagnoses:   Bronchitis       11/26/2017   [facility]     Kanwal Adame NP  11/26/17 9413

## 2017-11-26 NOTE — ED NOTES
C/o cough and body aches for about a week pt states that symptoms started out in sinuses and was in [place] a few weeks ago and her host was ill

## 2017-11-26 NOTE — DISCHARGE INSTRUCTIONS
Bronchitis, Antibiotic Treatment (Adult)    Bronchitis is an infection of the air passages (bronchial tubes) in your lungs. It often occurs when you have a cold. This illness is contagious during the first few days and is spread through the air by coughing and sneezing, or by direct contact (touching the sick person and then touching your own eyes, nose, or mouth).  Symptoms of bronchitis include cough with mucus (phlegm) and low-grade fever. Bronchitis usually lasts 7 to 14 days. Mild cases can be treated with simple home remedies. More severe infection is treated with an antibiotic.  Home care  Follow these guidelines when caring for yourself at home:    If your symptoms are severe, rest at home for the first 2 to 3 days. When you go back to your usual activities, don't let yourself get too tired.    Do not smoke. Also avoid being exposed to secondhand smoke.    You may use over-the-counter medicines to control fever or pain, unless another medicine was prescribed. (Note: If you have chronic liver or kidney disease or have ever had a stomach ulcer or gastrointestinal bleeding, talk with your healthcare provider before using these medicines. Also talk to your provider if you are taking medicine to prevent blood clots.) Aspirin should never be given to anyone younger than 18 years of age who is ill with a viral infection or fever. It may cause severe liver or brain damage.    Your appetite may be poor, so a light diet is fine. Avoid dehydration by drinking 6 to 8 glasses of fluids per day (such as water, soft drinks, sports drinks, juices, tea, or soup). Extra fluids will help loosen secretions in the nose and lungs.    Over-the-counter cough, cold, and sore-throat medicines will not shorten the length of the illness, but they may be helpful to reduce symptoms. (Note: Do not use decongestants if you have high blood pressure.)    Finish all antibiotic medicine. Do this even if you are feeling better after only a  few days.  Follow-up care  Follow up with your healthcare provider, or as advised. If you had an X-ray or ECG (electrocardiogram), a specialist will review it. You will be notified of any new findings that may affect your care.  Note: If you are age 65 or older, or if you have a chronic lung disease or condition that affects your immune system, or you smoke, talk to your healthcare provider about having pneumococcal vaccinations and a yearly influenza vaccination (flu shot).  When to seek medical advice  Call your healthcare provider right away if any of these occur:    Fever of 100.4 F (38 C) or higher    Coughing up increased amounts of colored sputum    Weakness, drowsiness, headache, facial pain, ear pain, or a stiff neck  Call 911, or get immediate medical care  Contact emergency services right away if any of these occur.    Coughing up blood    Worsening weakness, drowsiness, headache, or stiff neck    Trouble breathing, wheezing, or pain with breathing  Date Last Reviewed: 9/13/2015 2000-2017 [employer]. [address] [place] [address], [place] [address]. All rights reserved. This information is not intended as a substitute for professional medical care. Always follow your healthcare professional's instructions.

## 2017-11-26 NOTE — ED AVS SNAPSHOT
[facility]    [address]    [place] [address]    Phone:  [phone]                                       Yvonne Llanos   MRN: [id number]    Department:  [facility]   Date of Visit:  11/26/2017           Patient Information     Date Of Birth          1965        Your diagnoses for this visit were:     Bronchitis        You were seen by Kanwal Adame NP.      Follow-up Information     Follow up with Cholo Adame MD In 5 days.    Specialty:  Family Practice    Why:  for re-evaluation    Contact information:    [facility]  [address] [place] [address]  [phone]          Discharge Instructions         Bronchitis, Antibiotic Treatment (Adult)    Bronchitis is an infection of the air passages (bronchial tubes) in your lungs. It often occurs when you have a cold. This illness is contagious during the first few days and is spread through the air by coughing and sneezing, or by direct contact (touching the sick person and then touching your own eyes, nose, or mouth).  Symptoms of bronchitis include cough with mucus (phlegm) and low-grade fever. Bronchitis usually lasts 7 to 14 days. Mild cases can be treated with simple home remedies. More severe infection is treated with an antibiotic.  Home care  Follow these guidelines when caring for yourself at home:    If your symptoms are severe, rest at home for the first 2 to 3 days. When you go back to your usual activities, don't let yourself get too tired.    Do not smoke. Also avoid being exposed to secondhand smoke.    You may use over-the-counter medicines to control fever or pain, unless another medicine was prescribed. (Note: If you have chronic liver or kidney disease or have ever had a stomach ulcer or gastrointestinal bleeding, talk with your healthcare provider before using these medicines. Also talk to your provider if you are taking medicine to prevent blood clots.) Aspirin should  never be given to anyone younger than 18 years of age who is ill with a viral infection or fever. It may cause severe liver or brain damage.    Your appetite may be poor, so a light diet is fine. Avoid dehydration by drinking 6 to 8 glasses of fluids per day (such as water, soft drinks, sports drinks, juices, tea, or soup). Extra fluids will help loosen secretions in the nose and lungs.    Over-the-counter cough, cold, and sore-throat medicines will not shorten the length of the illness, but they may be helpful to reduce symptoms. (Note: Do not use decongestants if you have high blood pressure.)    Finish all antibiotic medicine. Do this even if you are feeling better after only a few days.  Follow-up care  Follow up with your healthcare provider, or as advised. If you had an X-ray or ECG (electrocardiogram), a specialist will review it. You will be notified of any new findings that may affect your care.  Note: If you are age 65 or older, or if you have a chronic lung disease or condition that affects your immune system, or you smoke, talk to your healthcare provider about having pneumococcal vaccinations and a yearly influenza vaccination (flu shot).  When to seek medical advice  Call your healthcare provider right away if any of these occur:    Fever of 100.4 F (38 C) or higher    Coughing up increased amounts of colored sputum    Weakness, drowsiness, headache, facial pain, ear pain, or a stiff neck  Call 911, or get immediate medical care  Contact emergency services right away if any of these occur.    Coughing up blood    Worsening weakness, drowsiness, headache, or stiff neck    Trouble breathing, wheezing, or pain with breathing  Date Last Reviewed: 9/13/2015 2000-2017 [employer].. [address] [place] [address], [place] [address]. All rights reserved. This information is not intended as a substitute for professional medical care. Always follow your healthcare professional's instructions.              Review of your medicines      START taking        Dose / Directions Last dose taken    azithromycin 250 MG tablet   Commonly known as:  ZITHROMAX Z-ANATOLY   Quantity:  6 tablet        Two tablets on the first day, then one tablet daily for the next 4 days   Refills:  0          Our records show that you are taking the medicines listed below. If these are incorrect, please call your family doctor or clinic.        Dose / Directions Last dose taken    AUBAGIO 14 MG tablet   Dose:  14 mg   Generic drug:  teriflunomide        Take 1 tablet (14 mg) by mouth daily Only available through select specialty pharmacies   Refills:  0        baclofen 20 MG tablet   Commonly known as:  LIORESAL   Dose:  20 mg   Quantity:  30 tablet        Take 1 tablet (20 mg) by mouth 2 times daily   Refills:  3        butalbital-acetaminophen-caffeine -40 MG per tablet   Commonly known as:  FIORICET/ESGIC   Quantity:  36 tablet        TAKE 1 TABLET BY MOUTH THREE TIMES WEEKLY AS NEEDED FOR HEADACHE   Refills:  3        cholecalciferol 5000 UNITS Caps capsule   Commonly known as:  vitamin D3   Dose:  5000 Units        Take 1 capsule (5,000 Units) by mouth daily   Refills:  0        folic acid-vit B6-vit B12 0.8-10-0.115 MG Tabs per tablet   Commonly known as:  FOLGARD   Dose:  1 tablet        Take 1 tablet by mouth daily   Refills:  0        gabapentin 400 MG capsule   Commonly known as:  NEURONTIN   Dose:  400 mg   Quantity:  360 capsule        Take 1 capsule (400 mg) by mouth 3 times daily   Refills:  3        losartan 100 MG tablet   Commonly known as:  COZAAR   Quantity:  90 tablet        TAKE 1 TABLET BY MOUTH ONCE DAILY.   Refills:  3        metoprolol 100 MG 24 hr tablet   Commonly known as:  TOPROL-XL   Dose:  100 mg   Quantity:  90 tablet        Take 1 tablet (100 mg) by mouth daily   Refills:  1        montelukast 10 MG tablet   Commonly known as:  SINGULAIR   Quantity:  30 tablet        TAKE 1 TABLET BY MOUTH EVERY EVENING    Refills:  3        MULTIVITAMIN GUMMIES ADULT Chew   Dose:  2 tablet   Quantity:  30 tablet        Take 2 tablets by mouth daily   Refills:  0        PRILOSEC OTC PO   Dose:  20 mg   Indication:  takes for upset set stomach caused by Aubagio        Take 20 mg by mouth daily   Refills:  0                Prescriptions were sent or printed at these locations (1 Prescription)                   [facility] [address] [place], [place] [address], [place] - [address] AT [facility] [address]   [address], [place] [address]    Telephone:  [phone]   Fax:  [phone]   Hours:                  E-Prescribed (1 of 1)         azithromycin (ZITHROMAX Z-ANATOLY) 250 MG tablet                Orders Needing Specimen Collection     None      Pending Results     No orders found from 11/24/2017 to 11/27/2017.            Pending Culture Results     No orders found from 11/24/2017 to 11/27/2017.            Thank you for choosing [facility]       Thank you for choosing [facility] for your care. Our goal is always to provide you with excellent care. Hearing back from our patients is one way we can continue to improve our services. Please take a few minutes to complete the written survey that you may receive in the mail after you visit with us. Thank you!        [facility] Information     [facility] gives you secure access to your electronic health record. If you see a primary care provider, you can also send messages to your care team and make appointments. If you have questions, please call your primary care clinic.  If you do not have a primary care provider, please call [phone] and they will assist you.        Care EveryWhere ID     This is your Care EveryWhere ID. This could be used by other organizations to access your [facility] medical records  [id number]        Equal Access to Services     AUSTEN MELISSA AH: Luis Enrique Olivo, alla marmolejo, rufino [place]. So [facility]  [phone].    ATENCIÓN: Si habla español, tiene a moya disposición servicios gratuitos de asistencia lingüística. Llame al [phone].    We comply with applicable federal civil rights laws and [place] laws. We do not discriminate on the basis of race, color, national origin, age, disability, sex, sexual orientation, or gender identity.            After Visit Summary       This is your record. Keep this with you and show to your community pharmacist(s) and doctor(s) at your next visit.

## 2017-12-05 ENCOUNTER — OFFICE VISIT (OUTPATIENT)
Dept: FAMILY MEDICINE | Facility: OTHER | Age: 52
End: 2017-12-05
Attending: FAMILY MEDICINE
Payer: COMMERCIAL

## 2017-12-05 VITALS
TEMPERATURE: 98 F | WEIGHT: 202 LBS | DIASTOLIC BLOOD PRESSURE: 60 MMHG | HEIGHT: 66 IN | SYSTOLIC BLOOD PRESSURE: 98 MMHG | HEART RATE: 75 BPM | BODY MASS INDEX: 32.47 KG/M2 | OXYGEN SATURATION: 98 %

## 2017-12-05 DIAGNOSIS — J20.9 ACUTE BRONCHITIS, UNSPECIFIED ORGANISM: Primary | ICD-10-CM

## 2017-12-05 PROCEDURE — 99213 OFFICE O/P EST LOW 20 MIN: CPT | Performed by: FAMILY MEDICINE

## 2017-12-05 RX ORDER — ALBUTEROL SULFATE 90 UG/1
2 AEROSOL, METERED RESPIRATORY (INHALATION) EVERY 6 HOURS PRN
Qty: 1 INHALER | Refills: 0 | Status: SHIPPED | OUTPATIENT
Start: 2017-12-05 | End: 2018-04-16

## 2017-12-05 ASSESSMENT — PAIN SCALES - GENERAL: PAINLEVEL: NO PAIN (0)

## 2017-12-05 NOTE — PROGRESS NOTES
Yvonne Llanos    2017    Chief Complaint   Patient presents with     URI     x 3 weeks cough, nasal congestion, fatigue, went to ER  treated with zpak       SUBJECTIVE:  Here for f/u bronchitis.  No better following azithro.  Tight cough worse at night.  Some rhinorrhea.  No facial pain.  No fever.  Immunosuppressed due to ms med.  See below.      Past Medical History:   Diagnosis Date     Asthma     mild[place] issues since      Dyslipidemia 2011     Endometriosis      Fibromyalgia      Hypertension 2011     Migraines      Other abnormal glucose 2011     Pancreatitis due to biliary obstruction     improved after cholecystectomy     Seasonal Allergies 2011     Sleep apnea     on CPAP pressure of 8, resolved s/p  gastric sleeve       Past Surgical History:   Procedure Laterality Date      x2       CHOLECYSTECTOMY       Hysterectomy with BSO      Endometriosis     LAPAROSCOPIC GASTRIC SLEEVE  2014    [place]     LAPAROSCOPY      D&C, exploratory,  placental tissue adhered to uterus     rotator cuff tendon surgery Left      [place]       Current Outpatient Prescriptions   Medication Sig Dispense Refill     amoxicillin-clavulanate (AUGMENTIN) 875-125 MG per tablet Take 1 tablet by mouth 2 times daily 20 tablet 0     Omeprazole Magnesium (PRILOSEC OTC PO) Take 20 mg by mouth daily       metoprolol (TOPROL-XL) 100 MG 24 hr tablet Take 1 tablet (100 mg) by mouth daily 90 tablet 1     baclofen (LIORESAL) 20 MG tablet Take 1 tablet (20 mg) by mouth 2 times daily 30 tablet 3     teriflunomide (AUBAGIO) 14 MG tablet Take 1 tablet (14 mg) by mouth daily Only available through select specialty pharmacies       losartan (COZAAR) 100 MG tablet TAKE 1 TABLET BY MOUTH ONCE DAILY. 90 tablet 3     butalbital-acetaminophen-caffeine (FIORICET/ESGIC) -40 MG per tablet TAKE 1 TABLET BY MOUTH THREE TIMES WEEKLY AS NEEDED FOR  HEADACHE 36 tablet 3     montelukast (SINGULAIR) 10 MG tablet TAKE 1 TABLET BY MOUTH EVERY EVENING 30 tablet 3     cholecalciferol (VITAMIN D3) 5000 UNITS CAPS capsule Take 1 capsule (5,000 Units) by mouth daily  0     folic acid-vit B6-vit B12 (FOLGARD) 0.8-10-0.115 MG TABS Take 1 tablet by mouth daily       Multiple Vitamins-Minerals (MULTIVITAMIN GUMMIES ADULT) CHEW Take 2 tablets by mouth daily 30 tablet 0     gabapentin (NEURONTIN) 400 MG capsule Take 1 capsule (400 mg) by mouth 3 times daily 360 capsule 3       Allergies   Allergen Reactions     Ace Inhibitors Cough     Amitriptyline Other (See Comments) and Nausea     Nightmares     Flagyl [Metronidazole] Nausea     Morphine Nausea     Sulfa Drugs        Family History   Problem Relation Age of Onset     Hypertension Mother      Heart Failure Mother      Congestive     Other - See Comments Mother      marcos dangunjans??  hypermobility     Lipids Father      Hyperlipidemia     CANCER Father      Skin     Hypertension Father      Prostate Cancer Father      Suicide Maternal Grandmother      DIABETES Maternal Grandfather      CLOTTING DISORDER Maternal Grandfather      DIABETES Paternal Grandmother      KIDNEY DISEASE Paternal Grandmother      Other - See Comments Paternal Grandfather      old age     Depression Brother      DIABETES Brother      type 2     Myocardial Infarction Brother 32     ETOHic, +tobacco     Peripheral Vascular Disease Brother      Hyperlipidemia Brother      Other - See Comments Sister      Post Partum DVT     Other Cancer Sister      retinal tear     Known Genetic Syndrome Daughter      marcos rehanas     Known Genetic Syndrome Daughter      marcos mitchell       Social History     Social History     Marital status:      Spouse name: Scooter     Number of children: 3     Years of education: 16     Occupational History      [employer]     FULL-TIME     Social History Main Topics     Smoking status: Never Smoker      Smokeless tobacco: Never Used     Alcohol use 0.0 oz/week     0 Standard drinks or equivalent per week      Comment: rarely     Drug use: No     Sexual activity: Yes     Partners: Male     Other Topics Concern      Service No     Blood Transfusions Yes     PERMITS     Caffeine Concern Yes     1-2 coffee/day     Exercise Yes     walking 20 min daily     Seat Belt Yes     Self-Exams Yes     Social History Narrative     -- none    Exercise -- walking 30 min daily    Caffeine -- 1 coffee/daily       5 point ROS negative except as noted above in HPI, including Gen., Resp., CV, GI &  system review.     OBJECTIVE:  B/P: 98/60, Temperature: 98, Pulse: 75, Respirations: Data Unavailable    GENERAL APPEARANCE: Alert, no acute distress  HEENt; normal.   CV: regular rate and rhythm, no murmur, rub or gallop  RESP: lungs clear to auscultation bilaterally with wheeze with cough which is prominent.   ABDOMEN: normal bowel sounds, soft, nontender, no hepatosplenomegaly or other masses  SKIN: no suspicious lesions or rashes to visualized skin  NEURO: Alert, oriented x 3, speech and mentation normal    ASSESSMENT and PLAN:  (J20.9) Acute bronchitis, unspecified organism  (primary encounter diagnosis)  Comment: reviewed.   Plan: amoxicillin-clavulanate (AUGMENTIN) 875-125 MG         per tablet        Tight cough and ongoing sx in someone who is immunosuppressed.  Getting the augmentin started, as nikro no help a while ago, and sent albuterol.

## 2017-12-05 NOTE — NURSING NOTE
"Chief Complaint   Patient presents with     URI     x 3 weeks cough, nasal congestion, fatigue, went to ER 11/26 treated with zpak       Initial BP 98/60  Pulse 75  Temp 98  F (36.7  C)  Ht 5' 6\" (1.676 m)  Wt 202 lb (91.6 kg)  SpO2 98%  BMI 32.6 kg/m2 Estimated body mass index is 32.6 kg/(m^2) as calculated from the following:    Height as of this encounter: 5' 6\" (1.676 m).    Weight as of this encounter: 202 lb (91.6 kg).  Medication Reconciliation: complete   Cathy [place]    "

## 2017-12-05 NOTE — MR AVS SNAPSHOT
"              After Visit Summary   12/5/2017    Yvonne Llanos    MRN: [id number]           Patient Information     Date Of Birth          1965        Visit Information        Provider Department      12/5/2017 4:00 PM Cholo Adame MD [facility]        Today's Diagnoses     Acute bronchitis, unspecified organism    -  1      Care Instructions    F/u with ongoing concerns.           Follow-ups after your visit        Who to contact     If you have questions or need follow up information about today's clinic visit or your schedule please contact [facility] (formerly [facility]) directly at [phone].  Normal or non-critical lab and imaging results will be communicated to you by Gudoghart, letter or phone within 4 business days after the clinic has received the results. If you do not hear from us within 7 days, please contact the clinic through Gudoghart or phone. If you have a critical or abnormal lab result, we will notify you by phone as soon as possible.  Submit refill requests through Citilog or call your pharmacy and they will forward the refill request to us. Please allow 3 business days for your refill to be completed.          Additional Information About Your Visit        MyChart Information     Citilog gives you secure access to your electronic health record. If you see a primary care provider, you can also send messages to your care team and make appointments. If you have questions, please call your primary care clinic.  If you do not have a primary care provider, please call [phone] and they will assist you.        Care EveryWhere ID     This is your Care EveryWhere ID. This could be used by other organizations to access your [facility] medical records  [id number]        Your Vitals Were     Pulse Temperature Height Pulse Oximetry BMI (Body Mass Index)       75 98  F (36.7  C) 5' 6\" (1.676 m) 98% 32.6 kg/m2        Blood Pressure from Last 3 Encounters:   12/05/17 98/60   11/26/17 " (!) 184/104   11/20/17 100/74    Weight from Last 3 Encounters:   12/05/17 202 lb (91.6 kg)   11/20/17 200 lb (90.7 kg)   10/26/17 204 lb 9.6 oz (92.8 kg)              Today, you had the following     No orders found for display         Today's Medication Changes          These changes are accurate as of: 12/5/17 11:59 PM.  If you have any questions, ask your nurse or doctor.               Start taking these medicines.        Dose/Directions    albuterol 108 (90 BASE) MCG/ACT Inhaler   Commonly known as:  PROAIR HFA/PROVENTIL HFA/VENTOLIN HFA   Used for:  Acute bronchitis, unspecified organism   Started by:  Cholo Adame MD        Dose:  2 puff   Inhale 2 puffs into the lungs every 6 hours as needed for shortness of breath / dyspnea or wheezing   Quantity:  1 Inhaler   Refills:  0       amoxicillin-clavulanate 875-125 MG per tablet   Commonly known as:  AUGMENTIN   Used for:  Acute bronchitis, unspecified organism   Started by:  Cholo Adame MD        Dose:  1 tablet   Take 1 tablet by mouth 2 times daily   Quantity:  20 tablet   Refills:  0            Where to get your medicines      These medications [facility] [address] [facility], [place] - [address] AT [facility] [address], [facility] [address]     Phone:  [phone]     albuterol 108 (90 BASE) MCG/ACT Inhaler    amoxicillin-clavulanate 875-125 MG per tablet                Primary Care Provider Office Phone # Fax #    Cholo Adame [phone]       [facility] [address]  [facility] [place] [address]        Equal Access to Services     [facility] SHIN AH: Hadii rodrigue mccauley Solisa, waaxda luqadaha, qaybta kaalmada adeegyada, rufino sanon. So [facility] [phone].    ATENCIÓN: Si habla español, tiene a moya disposición servicios gratuitos de asistencia lingüística. Llame al [phone].    We comply with applicable federal civil rights laws and [place] laws. We  do not discriminate on the basis of race, color, national origin, age, disability, sex, sexual orientation, or gender identity.            Thank you!     Thank you for choosing [facility]  for your care. Our goal is always to provide you with excellent care. Hearing back from our patients is one way we can continue to improve our services. Please take a few minutes to complete the written survey that you may receive in the mail after your visit with us. Thank you!             Your Updated Medication List - Protect others around you: Learn how to safely use, store and throw away your medicines at www.disposemymeds.org.          This list is accurate as of: 12/5/17 11:59 PM.  Always use your most recent med list.                   Brand Name Dispense Instructions for use Diagnosis    albuterol 108 (90 BASE) MCG/ACT Inhaler    PROAIR HFA/PROVENTIL HFA/VENTOLIN HFA    1 Inhaler    Inhale 2 puffs into the lungs every 6 hours as needed for shortness of breath / dyspnea or wheezing    Acute bronchitis, unspecified organism       amoxicillin-clavulanate 875-125 MG per tablet    AUGMENTIN    20 tablet    Take 1 tablet by mouth 2 times daily    Acute bronchitis, unspecified organism       AUBAGIO 14 MG tablet   Generic drug:  teriflunomide      Take 1 tablet (14 mg) by mouth daily Only available through select specialty pharmacies    MS (multiple sclerosis) (H)       baclofen 20 MG tablet    LIORESAL    30 tablet    Take 1 tablet (20 mg) by mouth 2 times daily    MS (multiple sclerosis) (H)       butalbital-acetaminophen-caffeine -40 MG per tablet    FIORICET/ESGIC    36 tablet    TAKE 1 TABLET BY MOUTH THREE TIMES WEEKLY AS NEEDED FOR HEADACHE    Other migraine without status migrainosus, not intractable       cholecalciferol 5000 UNITS Caps capsule    vitamin D3     Take 1 capsule (5,000 Units) by mouth daily        folic acid-vit B6-vit B12 0.8-10-0.115 MG Tabs per tablet    FOLGARD     Take 1 tablet  by mouth daily        gabapentin 400 MG capsule    NEURONTIN    360 capsule    Take 1 capsule (400 mg) by mouth 3 times daily    MS (multiple sclerosis) (H)       losartan 100 MG tablet    COZAAR    90 tablet    TAKE 1 TABLET BY MOUTH ONCE DAILY.    Essential hypertension, benign       metoprolol 100 MG 24 hr tablet    TOPROL-XL    90 tablet    Take 1 tablet (100 mg) by mouth daily    Benign essential hypertension       montelukast 10 MG tablet    SINGULAIR    30 tablet    TAKE 1 TABLET BY MOUTH EVERY EVENING    Seasonal allergic rhinitis, unspecified allergic rhinitis trigger       MULTIVITAMIN GUMMIES ADULT Chew     30 tablet    Take 2 tablets by mouth daily    Weight loss       PRILOSEC OTC PO      Take 20 mg by mouth daily    Benign essential hypertension

## 2017-12-08 ENCOUNTER — TRANSFERRED RECORDS (OUTPATIENT)
Dept: HEALTH INFORMATION MANAGEMENT | Facility: HOSPITAL | Age: 52
End: 2017-12-08

## 2018-01-18 DIAGNOSIS — R93.89 ABNORMAL MRI: Primary | ICD-10-CM

## 2018-01-18 DIAGNOSIS — M62.81 MUSCLE WEAKNESS (GENERALIZED): ICD-10-CM

## 2018-01-18 DIAGNOSIS — E55.9 VITAMIN D DEFICIENCY: ICD-10-CM

## 2018-01-18 LAB
ALBUMIN SERPL-MCNC: 3.7 G/DL (ref 3.4–5)
ALP SERPL-CCNC: 90 U/L (ref 40–150)
ALT SERPL W P-5'-P-CCNC: 46 U/L (ref 0–50)
ANION GAP SERPL CALCULATED.3IONS-SCNC: 9 MMOL/L (ref 3–14)
AST SERPL W P-5'-P-CCNC: 33 U/L (ref 0–45)
BASOPHILS # BLD AUTO: 0.1 10E9/L (ref 0–0.2)
BASOPHILS NFR BLD AUTO: 1.2 %
BILIRUB SERPL-MCNC: 0.2 MG/DL (ref 0.2–1.3)
BUN SERPL-MCNC: 15 MG/DL (ref 7–30)
CALCIUM SERPL-MCNC: 9 MG/DL (ref 8.5–10.1)
CHLORIDE SERPL-SCNC: 109 MMOL/L (ref 94–109)
CO2 SERPL-SCNC: 27 MMOL/L (ref 20–32)
CREAT SERPL-MCNC: 0.58 MG/DL (ref 0.52–1.04)
DIFFERENTIAL METHOD BLD: NORMAL
EOSINOPHIL # BLD AUTO: 0.2 10E9/L (ref 0–0.7)
EOSINOPHIL NFR BLD AUTO: 3.7 %
ERYTHROCYTE [DISTWIDTH] IN BLOOD BY AUTOMATED COUNT: 13.2 % (ref 10–15)
GFR SERPL CREATININE-BSD FRML MDRD: >90 ML/MIN/1.7M2
GLUCOSE SERPL-MCNC: 100 MG/DL (ref 70–99)
HCT VFR BLD AUTO: 38.2 % (ref 35–47)
HGB BLD-MCNC: 12.7 G/DL (ref 11.7–15.7)
IMM GRANULOCYTES # BLD: 0 10E9/L (ref 0–0.4)
IMM GRANULOCYTES NFR BLD: 0.2 %
LYMPHOCYTES # BLD AUTO: 1.9 10E9/L (ref 0.8–5.3)
LYMPHOCYTES NFR BLD AUTO: 37.5 %
MCH RBC QN AUTO: 28.7 PG (ref 26.5–33)
MCHC RBC AUTO-ENTMCNC: 33.2 G/DL (ref 31.5–36.5)
MCV RBC AUTO: 86 FL (ref 78–100)
MONOCYTES # BLD AUTO: 0.6 10E9/L (ref 0–1.3)
MONOCYTES NFR BLD AUTO: 11.7 %
NEUTROPHILS # BLD AUTO: 2.3 10E9/L (ref 1.6–8.3)
NEUTROPHILS NFR BLD AUTO: 45.7 %
NRBC # BLD AUTO: 0 10*3/UL
NRBC BLD AUTO-RTO: 0 /100
PLATELET # BLD AUTO: 224 10E9/L (ref 150–450)
POTASSIUM SERPL-SCNC: 3.9 MMOL/L (ref 3.4–5.3)
PROT SERPL-MCNC: 7.3 G/DL (ref 6.8–8.8)
RBC # BLD AUTO: 4.43 10E12/L (ref 3.8–5.2)
SODIUM SERPL-SCNC: 145 MMOL/L (ref 133–144)
WBC # BLD AUTO: 5.1 10E9/L (ref 4–11)

## 2018-01-18 PROCEDURE — 36415 COLL VENOUS BLD VENIPUNCTURE: CPT | Performed by: PSYCHIATRY & NEUROLOGY

## 2018-01-18 PROCEDURE — 85025 COMPLETE CBC W/AUTO DIFF WBC: CPT | Performed by: PSYCHIATRY & NEUROLOGY

## 2018-01-18 PROCEDURE — 80053 COMPREHEN METABOLIC PANEL: CPT | Performed by: PSYCHIATRY & NEUROLOGY

## 2018-02-19 DIAGNOSIS — R93.89 ABNORMAL MRI: Primary | ICD-10-CM

## 2018-02-19 LAB
ALBUMIN SERPL-MCNC: 3.8 G/DL (ref 3.4–5)
ALP SERPL-CCNC: 80 U/L (ref 40–150)
ALT SERPL W P-5'-P-CCNC: 37 U/L (ref 0–50)
ANION GAP SERPL CALCULATED.3IONS-SCNC: 3 MMOL/L (ref 3–14)
AST SERPL W P-5'-P-CCNC: 18 U/L (ref 0–45)
BASOPHILS # BLD AUTO: 0.1 10E9/L (ref 0–0.2)
BASOPHILS NFR BLD AUTO: 1.2 %
BILIRUB SERPL-MCNC: 0.3 MG/DL (ref 0.2–1.3)
BUN SERPL-MCNC: 12 MG/DL (ref 7–30)
CALCIUM SERPL-MCNC: 9 MG/DL (ref 8.5–10.1)
CHLORIDE SERPL-SCNC: 109 MMOL/L (ref 94–109)
CO2 SERPL-SCNC: 30 MMOL/L (ref 20–32)
CREAT SERPL-MCNC: 0.7 MG/DL (ref 0.52–1.04)
DIFFERENTIAL METHOD BLD: NORMAL
EOSINOPHIL # BLD AUTO: 0.2 10E9/L (ref 0–0.7)
EOSINOPHIL NFR BLD AUTO: 3.7 %
ERYTHROCYTE [DISTWIDTH] IN BLOOD BY AUTOMATED COUNT: 13.3 % (ref 10–15)
GFR SERPL CREATININE-BSD FRML MDRD: 88 ML/MIN/1.7M2
GLUCOSE SERPL-MCNC: 90 MG/DL (ref 70–99)
HCT VFR BLD AUTO: 40.8 % (ref 35–47)
HGB BLD-MCNC: 13.2 G/DL (ref 11.7–15.7)
IMM GRANULOCYTES # BLD: 0 10E9/L (ref 0–0.4)
IMM GRANULOCYTES NFR BLD: 0.2 %
LYMPHOCYTES # BLD AUTO: 1.8 10E9/L (ref 0.8–5.3)
LYMPHOCYTES NFR BLD AUTO: 33.9 %
MCH RBC QN AUTO: 28.3 PG (ref 26.5–33)
MCHC RBC AUTO-ENTMCNC: 32.4 G/DL (ref 31.5–36.5)
MCV RBC AUTO: 87 FL (ref 78–100)
MONOCYTES # BLD AUTO: 0.6 10E9/L (ref 0–1.3)
MONOCYTES NFR BLD AUTO: 10.6 %
NEUTROPHILS # BLD AUTO: 2.6 10E9/L (ref 1.6–8.3)
NEUTROPHILS NFR BLD AUTO: 50.4 %
NRBC # BLD AUTO: 0 10*3/UL
NRBC BLD AUTO-RTO: 0 /100
PLATELET # BLD AUTO: 206 10E9/L (ref 150–450)
POTASSIUM SERPL-SCNC: 4.1 MMOL/L (ref 3.4–5.3)
PROT SERPL-MCNC: 7.5 G/DL (ref 6.8–8.8)
RBC # BLD AUTO: 4.67 10E12/L (ref 3.8–5.2)
SODIUM SERPL-SCNC: 142 MMOL/L (ref 133–144)
WBC # BLD AUTO: 5.2 10E9/L (ref 4–11)

## 2018-02-19 PROCEDURE — 80053 COMPREHEN METABOLIC PANEL: CPT | Performed by: PSYCHIATRY & NEUROLOGY

## 2018-02-19 PROCEDURE — 85025 COMPLETE CBC W/AUTO DIFF WBC: CPT | Performed by: PSYCHIATRY & NEUROLOGY

## 2018-02-19 PROCEDURE — 36415 COLL VENOUS BLD VENIPUNCTURE: CPT | Performed by: PSYCHIATRY & NEUROLOGY

## 2018-03-28 ENCOUNTER — OFFICE VISIT (OUTPATIENT)
Dept: FAMILY MEDICINE | Facility: OTHER | Age: 53
End: 2018-03-28
Attending: FAMILY MEDICINE
Payer: COMMERCIAL

## 2018-03-28 VITALS
TEMPERATURE: 98.7 F | RESPIRATION RATE: 16 BRPM | HEIGHT: 66 IN | DIASTOLIC BLOOD PRESSURE: 100 MMHG | SYSTOLIC BLOOD PRESSURE: 138 MMHG | HEART RATE: 71 BPM | BODY MASS INDEX: 32.47 KG/M2 | OXYGEN SATURATION: 98 % | WEIGHT: 202 LBS

## 2018-03-28 DIAGNOSIS — R93.89 ABNORMAL MRI: Primary | ICD-10-CM

## 2018-03-28 DIAGNOSIS — M62.81 MUSCLE WEAKNESS (GENERALIZED): ICD-10-CM

## 2018-03-28 DIAGNOSIS — I10 ESSENTIAL HYPERTENSION, BENIGN: Primary | ICD-10-CM

## 2018-03-28 DIAGNOSIS — E55.9 VITAMIN D DEFICIENCY: ICD-10-CM

## 2018-03-28 PROBLEM — M79.7 FIBROMYALGIA: Status: ACTIVE | Noted: 2018-03-28

## 2018-03-28 LAB
ALBUMIN SERPL-MCNC: 3.7 G/DL (ref 3.4–5)
ALP SERPL-CCNC: 86 U/L (ref 40–150)
ALT SERPL W P-5'-P-CCNC: 35 U/L (ref 0–50)
ANION GAP SERPL CALCULATED.3IONS-SCNC: 7 MMOL/L (ref 3–14)
AST SERPL W P-5'-P-CCNC: 21 U/L (ref 0–45)
BASOPHILS # BLD AUTO: 0.1 10E9/L (ref 0–0.2)
BASOPHILS NFR BLD AUTO: 0.9 %
BILIRUB SERPL-MCNC: 0.2 MG/DL (ref 0.2–1.3)
BUN SERPL-MCNC: 15 MG/DL (ref 7–30)
CALCIUM SERPL-MCNC: 9 MG/DL (ref 8.5–10.1)
CHLORIDE SERPL-SCNC: 107 MMOL/L (ref 94–109)
CO2 SERPL-SCNC: 28 MMOL/L (ref 20–32)
CREAT SERPL-MCNC: 0.75 MG/DL (ref 0.52–1.04)
DIFFERENTIAL METHOD BLD: NORMAL
EOSINOPHIL # BLD AUTO: 0.2 10E9/L (ref 0–0.7)
EOSINOPHIL NFR BLD AUTO: 2.6 %
ERYTHROCYTE [DISTWIDTH] IN BLOOD BY AUTOMATED COUNT: 13.5 % (ref 10–15)
GFR SERPL CREATININE-BSD FRML MDRD: 81 ML/MIN/1.7M2
GLUCOSE SERPL-MCNC: 102 MG/DL (ref 70–99)
HCT VFR BLD AUTO: 40 % (ref 35–47)
HGB BLD-MCNC: 13.3 G/DL (ref 11.7–15.7)
IMM GRANULOCYTES # BLD: 0 10E9/L (ref 0–0.4)
IMM GRANULOCYTES NFR BLD: 0.3 %
LYMPHOCYTES # BLD AUTO: 2.1 10E9/L (ref 0.8–5.3)
LYMPHOCYTES NFR BLD AUTO: 33 %
MCH RBC QN AUTO: 29 PG (ref 26.5–33)
MCHC RBC AUTO-ENTMCNC: 33.3 G/DL (ref 31.5–36.5)
MCV RBC AUTO: 87 FL (ref 78–100)
MONOCYTES # BLD AUTO: 0.7 10E9/L (ref 0–1.3)
MONOCYTES NFR BLD AUTO: 10.9 %
NEUTROPHILS # BLD AUTO: 3.4 10E9/L (ref 1.6–8.3)
NEUTROPHILS NFR BLD AUTO: 52.3 %
NRBC # BLD AUTO: 0 10*3/UL
NRBC BLD AUTO-RTO: 0 /100
PLATELET # BLD AUTO: 210 10E9/L (ref 150–450)
POTASSIUM SERPL-SCNC: 3.8 MMOL/L (ref 3.4–5.3)
PROT SERPL-MCNC: 7.6 G/DL (ref 6.8–8.8)
RBC # BLD AUTO: 4.59 10E12/L (ref 3.8–5.2)
SODIUM SERPL-SCNC: 142 MMOL/L (ref 133–144)
WBC # BLD AUTO: 6.5 10E9/L (ref 4–11)

## 2018-03-28 PROCEDURE — 36415 COLL VENOUS BLD VENIPUNCTURE: CPT | Performed by: PSYCHIATRY & NEUROLOGY

## 2018-03-28 PROCEDURE — 80053 COMPREHEN METABOLIC PANEL: CPT | Performed by: PSYCHIATRY & NEUROLOGY

## 2018-03-28 PROCEDURE — 85025 COMPLETE CBC W/AUTO DIFF WBC: CPT | Performed by: PSYCHIATRY & NEUROLOGY

## 2018-03-28 PROCEDURE — 99213 OFFICE O/P EST LOW 20 MIN: CPT | Performed by: FAMILY MEDICINE

## 2018-03-28 RX ORDER — AMLODIPINE BESYLATE 10 MG/1
10 TABLET ORAL DAILY
Qty: 30 TABLET | Refills: 1 | Status: SHIPPED | OUTPATIENT
Start: 2018-03-28 | End: 2018-04-26

## 2018-03-28 ASSESSMENT — ANXIETY QUESTIONNAIRES
1. FEELING NERVOUS, ANXIOUS, OR ON EDGE: NOT AT ALL
2. NOT BEING ABLE TO STOP OR CONTROL WORRYING: NOT AT ALL
5. BEING SO RESTLESS THAT IT IS HARD TO SIT STILL: NOT AT ALL
3. WORRYING TOO MUCH ABOUT DIFFERENT THINGS: NOT AT ALL
7. FEELING AFRAID AS IF SOMETHING AWFUL MIGHT HAPPEN: NOT AT ALL
6. BECOMING EASILY ANNOYED OR IRRITABLE: NOT AT ALL
GAD7 TOTAL SCORE: 0
IF YOU CHECKED OFF ANY PROBLEMS ON THIS QUESTIONNAIRE, HOW DIFFICULT HAVE THESE PROBLEMS MADE IT FOR YOU TO DO YOUR WORK, TAKE CARE OF THINGS AT HOME, OR GET ALONG WITH OTHER PEOPLE: NOT DIFFICULT AT ALL

## 2018-03-28 ASSESSMENT — PAIN SCALES - GENERAL: PAINLEVEL: MILD PAIN (3)

## 2018-03-28 ASSESSMENT — PATIENT HEALTH QUESTIONNAIRE - PHQ9: 5. POOR APPETITE OR OVEREATING: NOT AT ALL

## 2018-03-28 NOTE — NURSING NOTE
"Chief Complaint   Patient presents with     Hypertension     Pt is in for a FU on HTN. Pt has increased HA's and has had ear ringing. Pt is on Aubagio which can cause elevated BP.       Initial BP (!) 144/122 (BP Location: Right arm, Patient Position: Chair, Cuff Size: Adult Large)  Pulse 71  Temp 98.7  F (37.1  C) (Tympanic)  Resp 16  Ht 5' 6\" (1.676 m)  Wt 202 lb (91.6 kg)  SpO2 98%  BMI 32.6 kg/m2 Estimated body mass index is 32.6 kg/(m^2) as calculated from the following:    Height as of this encounter: 5' 6\" (1.676 m).    Weight as of this encounter: 202 lb (91.6 kg).  Medication Reconciliation: complete   Destiny Gaxiola    "

## 2018-03-28 NOTE — PROGRESS NOTES
Yvonne Llanos    2018    Chief Complaint   Patient presents with     Hypertension     Pt is in for a FU on HTN. Pt has increased HA's and has had ear ringing. Pt is on Aubagio which can cause elevated BP.       SUBJECTIVE:  Here for f/u.  Having high bp. We discussed and reviewed.  She does seem to get some sx when she has the high bp (increased headaches).  We reviewed and discussed.  See below.     Past Medical History:   Diagnosis Date     Asthma     mild[place] issues [place]      Dyslipidemia 2011     Endometriosis      Fibromyalgia      Hypertension 2011     Migraines      Other abnormal glucose 2011     Pancreatitis due to biliary obstruction     improved after cholecystectomy     Seasonal Allergies 2011     Sleep apnea     on CPAP pressure of 8, resolved s/p  gastric sleeve       Past Surgical History:   Procedure Laterality Date      x2       CHOLECYSTECTOMY       Hysterectomy with BSO      Endometriosis     LAPAROSCOPIC GASTRIC SLEEVE  2014    [place]     LAPAROSCOPY      D&C, exploratory,  placental tissue adhered to uterus     rotator cuff tendon surgery Left      [place]       Current Outpatient Prescriptions   Medication Sig Dispense Refill     amLODIPine (NORVASC) 10 MG tablet Take 1 tablet (10 mg) by mouth daily 30 tablet 1     albuterol (PROAIR HFA/PROVENTIL HFA/VENTOLIN HFA) 108 (90 BASE) MCG/ACT Inhaler Inhale 2 puffs into the lungs every 6 hours as needed for shortness of breath / dyspnea or wheezing 1 Inhaler 0     Omeprazole Magnesium (PRILOSEC OTC PO) Take 20 mg by mouth daily       metoprolol (TOPROL-XL) 100 MG 24 hr tablet Take 1 tablet (100 mg) by mouth daily 90 tablet 1     baclofen (LIORESAL) 20 MG tablet Take 1 tablet (20 mg) by mouth 2 times daily 30 tablet 3     teriflunomide (AUBAGIO) 14 MG tablet Take 1 tablet (14 mg) by mouth daily Only available through select specialty pharmacies        losartan (COZAAR) 100 MG tablet TAKE 1 TABLET BY MOUTH ONCE DAILY. 90 tablet 3     gabapentin (NEURONTIN) 400 MG capsule Take 1 capsule (400 mg) by mouth 3 times daily 360 capsule 3     butalbital-acetaminophen-caffeine (FIORICET/ESGIC) -40 MG per tablet TAKE 1 TABLET BY MOUTH THREE TIMES WEEKLY AS NEEDED FOR HEADACHE 36 tablet 3     montelukast (SINGULAIR) 10 MG tablet TAKE 1 TABLET BY MOUTH EVERY EVENING 30 tablet 3     cholecalciferol (VITAMIN D3) 5000 UNITS CAPS capsule Take 1 capsule (5,000 Units) by mouth daily  0     folic acid-vit B6-vit B12 (FOLGARD) 0.8-10-0.115 MG TABS Take 1 tablet by mouth daily       Multiple Vitamins-Minerals (MULTIVITAMIN GUMMIES ADULT) CHEW Take 2 tablets by mouth daily 30 tablet 0       Allergies   Allergen Reactions     Ace Inhibitors Cough     Amitriptyline Other (See Comments) and Nausea     Nightmares     Codeine      Headache and nausea      Flagyl [Metronidazole] Nausea     Lactose      Morphine Nausea     Promethazine      nausea     Seasonal      Sulfa Drugs        Family History   Problem Relation Age of Onset     Hypertension Mother      Heart Failure Mother      Congestive     Other - See Comments Mother      marcos danlos??  hypermobility     Lipids Father      Hyperlipidemia     CANCER Father      Skin     Hypertension Father      Prostate Cancer Father      Suicide Maternal Grandmother      DIABETES Maternal Grandfather      CLOTTING DISORDER Maternal Grandfather      DIABETES Paternal Grandmother      KIDNEY DISEASE Paternal Grandmother      Other - See Comments Paternal Grandfather      old age     Depression Brother      DIABETES Brother      type 2     Myocardial Infarction Brother 32     ETOHic, +tobacco     Peripheral Vascular Disease Brother      Hyperlipidemia Brother      Other - See Comments Sister      Post Partum DVT     Other Cancer Sister      retinal tear     Known Genetic Syndrome Daughter      marcos rehanas     Known Genetic Syndrome Daughter       marcos mitchell       Social History     Social History     Marital status:      Spouse name: Scooter     Number of children: 3     Years of education: 16     Occupational History      [employer]     FULL-TIME     Social History Main Topics     Smoking status: Never Smoker     Smokeless tobacco: Never Used     Alcohol use 0.0 oz/week     0 Standard drinks or equivalent per week      Comment: rarely     Drug use: No     Sexual activity: Yes     Partners: Male     Other Topics Concern      Service No     Blood Transfusions Yes     PERMITS     Caffeine Concern Yes     1-2 coffee/day     Exercise Yes     walking 20 min daily     Seat Belt Yes     Self-Exams Yes     Social History Narrative     -- none    Exercise -- walking 30 min daily    Caffeine -- 1 coffee/daily       5 point ROS negative except as noted above in HPI, including Gen., Resp., CV, GI &  system review.     OBJECTIVE:  B/P: 144/122, Temperature: 98.7, Pulse: 71, Respirations: 16    GENERAL APPEARANCE: Alert, no acute distress  CV: regular rate and rhythm, no murmur, rub or gallop  RESP: lungs clear to auscultation bilaterally  SKIN: no suspicious lesions or rashes to visualized skin  NEURO: Alert, oriented x 3, speech and mentation normal    ASSESSMENT and PLAN:  (I10) Essential hypertension, benign  (primary encounter diagnosis)  Comment: reviewed.   Plan: amLODIPine (NORVASC) 10 MG tablet        Add norvasc.  Warned about SE of edema.  1 month f/u arranged.

## 2018-03-29 ASSESSMENT — ANXIETY QUESTIONNAIRES: GAD7 TOTAL SCORE: 0

## 2018-03-29 ASSESSMENT — PATIENT HEALTH QUESTIONNAIRE - PHQ9: SUM OF ALL RESPONSES TO PHQ QUESTIONS 1-9: 4

## 2018-04-16 ENCOUNTER — OFFICE VISIT (OUTPATIENT)
Dept: FAMILY MEDICINE | Facility: OTHER | Age: 53
End: 2018-04-16
Attending: FAMILY MEDICINE
Payer: COMMERCIAL

## 2018-04-16 VITALS
SYSTOLIC BLOOD PRESSURE: 120 MMHG | WEIGHT: 202 LBS | HEIGHT: 66 IN | OXYGEN SATURATION: 97 % | TEMPERATURE: 99.1 F | HEART RATE: 86 BPM | DIASTOLIC BLOOD PRESSURE: 60 MMHG | BODY MASS INDEX: 32.47 KG/M2

## 2018-04-16 DIAGNOSIS — J06.9 UPPER RESPIRATORY TRACT INFECTION, UNSPECIFIED TYPE: Primary | ICD-10-CM

## 2018-04-16 DIAGNOSIS — I10 BENIGN ESSENTIAL HYPERTENSION: ICD-10-CM

## 2018-04-16 DIAGNOSIS — J01.00 ACUTE MAXILLARY SINUSITIS, RECURRENCE NOT SPECIFIED: ICD-10-CM

## 2018-04-16 DIAGNOSIS — J20.9 ACUTE BRONCHITIS, UNSPECIFIED ORGANISM: ICD-10-CM

## 2018-04-16 PROCEDURE — 99213 OFFICE O/P EST LOW 20 MIN: CPT | Performed by: FAMILY MEDICINE

## 2018-04-16 RX ORDER — ALBUTEROL SULFATE 90 UG/1
2 AEROSOL, METERED RESPIRATORY (INHALATION) EVERY 6 HOURS PRN
Qty: 1 INHALER | Refills: 3 | Status: SHIPPED | OUTPATIENT
Start: 2018-04-16 | End: 2019-04-30

## 2018-04-16 RX ORDER — METOPROLOL SUCCINATE 100 MG/1
100 TABLET, EXTENDED RELEASE ORAL DAILY
Qty: 90 TABLET | Refills: 1 | Status: SHIPPED | OUTPATIENT
Start: 2018-04-16 | End: 2018-10-06

## 2018-04-16 ASSESSMENT — PAIN SCALES - GENERAL: PAINLEVEL: MODERATE PAIN (5)

## 2018-04-16 NOTE — PROGRESS NOTES
Yvonne Llanos    2018    Chief Complaint   Patient presents with     URI     x4 days, chest congestion, cough, diarrhea, body aches, chills and fatigue.     Refill Request     Toprol and Albuterol Inhaler.       SUBJECTIVE:  Here for URI sx.  Terrible through the weekend.  Started 4 days ago.  Immunosuppressed, and travelling this upcoming weekend.  Bad rhinorrhea, cough, ST, diarrhea, body aches, and chills.      Past Medical History:   Diagnosis Date     Asthma     mild[place] issues [place]      Dyslipidemia 2011     Endometriosis      Fibromyalgia      Hypertension 2011     Migraines      Other abnormal glucose 2011     Pancreatitis due to biliary obstruction     improved after cholecystectomy     Seasonal Allergies 2011     Sleep apnea     on CPAP pressure of 8, resolved s/p  gastric sleeve       Past Surgical History:   Procedure Laterality Date      x2       CHOLECYSTECTOMY       Hysterectomy with BSO      Endometriosis     LAPAROSCOPIC GASTRIC SLEEVE  2014    [place]     LAPAROSCOPY      D&C, exploratory,  placental tissue adhered to uterus     rotator cuff tendon surgery Left      [place]       Current Outpatient Prescriptions   Medication Sig Dispense Refill     amoxicillin-clavulanate (AUGMENTIN) 875-125 MG per tablet Take 1 tablet by mouth 2 times daily 20 tablet 0     amLODIPine (NORVASC) 10 MG tablet Take 1 tablet (10 mg) by mouth daily 30 tablet 1     albuterol (PROAIR HFA/PROVENTIL HFA/VENTOLIN HFA) 108 (90 BASE) MCG/ACT Inhaler Inhale 2 puffs into the lungs every 6 hours as needed for shortness of breath / dyspnea or wheezing 1 Inhaler 0     Omeprazole Magnesium (PRILOSEC OTC PO) Take 20 mg by mouth daily       metoprolol (TOPROL-XL) 100 MG 24 hr tablet Take 1 tablet (100 mg) by mouth daily 90 tablet 1     baclofen (LIORESAL) 20 MG tablet Take 1 tablet (20 mg) by mouth 2 times daily 30 tablet 3      teriflunomide (AUBAGIO) 14 MG tablet Take 1 tablet (14 mg) by mouth daily Only available through select specialty pharmacies       losartan (COZAAR) 100 MG tablet TAKE 1 TABLET BY MOUTH ONCE DAILY. 90 tablet 3     butalbital-acetaminophen-caffeine (FIORICET/ESGIC) -40 MG per tablet TAKE 1 TABLET BY MOUTH THREE TIMES WEEKLY AS NEEDED FOR HEADACHE 36 tablet 3     montelukast (SINGULAIR) 10 MG tablet TAKE 1 TABLET BY MOUTH EVERY EVENING 30 tablet 3     cholecalciferol (VITAMIN D3) 5000 UNITS CAPS capsule Take 1 capsule (5,000 Units) by mouth daily  0     folic acid-vit B6-vit B12 (FOLGARD) 0.8-10-0.115 MG TABS Take 1 tablet by mouth daily       Multiple Vitamins-Minerals (MULTIVITAMIN GUMMIES ADULT) CHEW Take 2 tablets by mouth daily 30 tablet 0     gabapentin (NEURONTIN) 400 MG capsule Take 1 capsule (400 mg) by mouth 3 times daily 360 capsule 3       Allergies   Allergen Reactions     Ace Inhibitors Cough     Amitriptyline Other (See Comments) and Nausea     Nightmares     Codeine      Headache and nausea      Flagyl [Metronidazole] Nausea     Lactose      Morphine Nausea     Promethazine      nausea     Seasonal      Sulfa Drugs        Family History   Problem Relation Age of Onset     Hypertension Mother      Heart Failure Mother      Congestive     Other - See Comments Mother      marcos danlos??  hypermobility     Lipids Father      Hyperlipidemia     CANCER Father      Skin     Hypertension Father      Prostate Cancer Father      Suicide Maternal Grandmother      DIABETES Maternal Grandfather      CLOTTING DISORDER Maternal Grandfather      DIABETES Paternal Grandmother      KIDNEY DISEASE Paternal Grandmother      Other - See Comments Paternal Grandfather      old age     Depression Brother      DIABETES Brother      type 2     Myocardial Infarction Brother 32     ETOHic, +tobacco     Peripheral Vascular Disease Brother      Hyperlipidemia Brother      Other - See Comments Sister      Post Partum DVT      Other Cancer Sister      retinal tear     Known Genetic Syndrome Daughter      marcos mitchell     Known Genetic Syndrome Daughter      marcos mitchell       Social History     Social History     Marital status:      Spouse name: Scooter     Number of children: 3     Years of education: 16     Occupational History      [employer]     FULL-TIME     Social History Main Topics     Smoking status: Never Smoker     Smokeless tobacco: Never Used     Alcohol use 0.0 oz/week     0 Standard drinks or equivalent per week      Comment: rarely     Drug use: No     Sexual activity: Yes     Partners: Male     Other Topics Concern      Service No     Blood Transfusions Yes     PERMITS     Caffeine Concern Yes     1-2 coffee/day     Exercise Yes     walking 20 min daily     Seat Belt Yes     Self-Exams Yes     Social History Narrative     -- none    Exercise -- walking 30 min daily    Caffeine -- 1 coffee/daily       5 point ROS negative except as noted above in HPI, including Gen., Resp., CV, GI &  system review.     OBJECTIVE:  B/P: 120/60, Temperature: 99.1, Pulse: 86, Respirations: Data Unavailable    GENERAL APPEARANCE: Alert, no acute distress  HEENT:  Normal ears.  Normal throat.  No adenopathy.    CV: regular rate and rhythm, no murmur, rub or gallop  RESP: lungs clear to auscultation bilaterally  ABDOMEN: normal bowel sounds, soft, nontender, no hepatosplenomegaly or other masses  SKIN: no suspicious lesions or rashes to visualized skin  NEURO: Alert, oriented x 3, speech and mentation normal    ASSESSMENT and PLAN:  (J06.9) Upper respiratory tract infection, unspecified type  (primary encounter diagnosis)  Comment: in an immunosuppressed person.   Plan: reviewed.  I think it's probably still viral.  Consider bacterial certainly in the sinuses.  I gave abx to have and hold.  She is travelling this weekend.     (J01.00) Acute maxillary sinusitis, recurrence not  specified  Comment: as above.   Plan: amoxicillin-clavulanate (AUGMENTIN) 875-125 MG         per tablet        As above.

## 2018-04-16 NOTE — MR AVS SNAPSHOT
After Visit Summary   4/16/2018    Yvonne Llanos    MRN: [id number]           Patient Information     Date Of Birth          1965        Visit Information        Provider Department      4/16/2018 9:30 AM Cholo Adame MD [facility]        Today's Diagnoses     Upper respiratory tract infection, unspecified type    -  1    Acute maxillary sinusitis, recurrence not specified        Benign essential hypertension        Acute bronchitis, unspecified organism          Care Instructions    F/u with ongoing concerns.           Follow-ups after your visit        Your next 10 appointments already scheduled     Apr 26, 2018  1:15 PM CDT   (Arrive by 1:00 PM)   SHORT with Cholo Adame MD   [facility] ([facility] )    [address] [address]   [phone]              Who to contact     If you have questions or need follow up information about today's clinic visit or your schedule please contact [facility] ([facility]) directly at [phone].  Normal or non-critical lab and imaging results will be communicated to you by Whitetrufflehart, letter or phone within 4 business days after the clinic has received the results. If you do not hear from us within 7 days, please contact the clinic through Jobrt or phone. If you have a critical or abnormal lab result, we will notify you by phone as soon as possible.  Submit refill requests through Swatchcloud or call your pharmacy and they will forward the refill request to us. Please allow 3 business days for your refill to be completed.          Additional Information About Your Visit        MyChart Information     Swatchcloud gives you secure access to your electronic health record. If you see a primary care provider, you can also send messages to your care team and make appointments. If you have questions, please call your primary care clinic.  If you do not have a primary care provider, please call  "[phone] and they will assist you.        Care EveryWhere ID     This is your Care EveryWhere ID. This could be used by other organizations to access your [facility] medical records  [id number]        Your Vitals Were     Pulse Temperature Height Pulse Oximetry BMI (Body Mass Index)       86 99.1  F (37.3  C) (Tympanic) 5' 6\" (1.676 m) 97% 32.6 kg/m2        Blood Pressure from Last 3 Encounters:   04/16/18 120/60   03/28/18 (!) 138/100   12/05/17 98/60    Weight from Last 3 Encounters:   04/16/18 202 lb (91.6 kg)   03/28/18 202 lb (91.6 kg)   12/05/17 202 lb (91.6 kg)              Today, you had the following     No orders found for display         Today's Medication Changes          These changes are accurate as of 4/16/18 11:03 AM.  If you have any questions, ask your nurse or doctor.               Start taking these medicines.        Dose/Directions    amoxicillin-clavulanate 875-125 MG per tablet   Commonly known as:  AUGMENTIN   Used for:  Acute maxillary sinusitis, recurrence not specified   Started by:  Cholo Adame MD        Dose:  1 tablet   Take 1 tablet by mouth 2 times daily   Quantity:  20 tablet   Refills:  0            Where to get your medicines      [facility] [id number]  [place] - [address] AT [facility] [address]  [address], [facility] [address]     Phone:  [phone]     albuterol 108 (90 Base) MCG/ACT Inhaler    amoxicillin-clavulanate 875-125 MG per tablet    metoprolol succinate 100 MG 24 hr tablet                Primary Care Provider Office Phone # Fax #    Cholo Adame [phone]       [address] [place] [address]        Equal Access to Services     AUSTEN MELISSA AH: Luis Enrique grimeso Solisa, waaxda luqadaha, qaybta kaalmada adeegyada, waxay tommy sanon. So [facility] [phone].    ATENCIÓN: Si habla español, tiene a moya disposición servicios gratuitos de asistencia " lingüísticaRosa Schwartz al [phone].    We comply with applicable federal civil rights laws and [place] laws. We do not discriminate on the basis of race, color, national origin, age, disability, sex, sexual orientation, or gender identity.            Thank you!     Thank you for choosing [facility]  for your care. Our goal is always to provide you with excellent care. Hearing back from our patients is one way we can continue to improve our services. Please take a few minutes to complete the written survey that you may receive in the mail after your visit with us. Thank you!             Your Updated Medication List - Protect others around you: Learn how to safely use, store and throw away your medicines at www.disposemymeds.org.          This list is accurate as of 4/16/18 11:03 AM.  Always use your most recent med list.                   Brand Name Dispense Instructions for use Diagnosis    albuterol 108 (90 Base) MCG/ACT Inhaler    PROAIR HFA/PROVENTIL HFA/VENTOLIN HFA    1 Inhaler    Inhale 2 puffs into the lungs every 6 hours as needed for shortness of breath / dyspnea or wheezing    Acute bronchitis, unspecified organism       amLODIPine 10 MG tablet    NORVASC    30 tablet    Take 1 tablet (10 mg) by mouth daily    Essential hypertension, benign       amoxicillin-clavulanate 875-125 MG per tablet    AUGMENTIN    20 tablet    Take 1 tablet by mouth 2 times daily    Acute maxillary sinusitis, recurrence not specified       AUBAGIO 14 MG tablet   Generic drug:  teriflunomide      Take 1 tablet (14 mg) by mouth daily Only available through select specialty pharmacies    MS (multiple sclerosis) (H)       baclofen 20 MG tablet    LIORESAL    30 tablet    Take 1 tablet (20 mg) by mouth 2 times daily    MS (multiple sclerosis) (H)       butalbital-acetaminophen-caffeine -40 MG per tablet    FIORICET/ESGIC    36 tablet    TAKE 1 TABLET BY MOUTH THREE TIMES WEEKLY AS NEEDED FOR HEADACHE    Other  migraine without status migrainosus, not intractable       cholecalciferol 5000 units Caps capsule    vitamin D3     Take 1 capsule (5,000 Units) by mouth daily        folic acid-vit B6-vit B12 0.8-10-0.115 MG Tabs per tablet    FOLGARD     Take 1 tablet by mouth daily        gabapentin 400 MG capsule    NEURONTIN    360 capsule    Take 1 capsule (400 mg) by mouth 3 times daily    MS (multiple sclerosis) (H)       losartan 100 MG tablet    COZAAR    90 tablet    TAKE 1 TABLET BY MOUTH ONCE DAILY.    Essential hypertension, benign       metoprolol succinate 100 MG 24 hr tablet    TOPROL-XL    90 tablet    Take 1 tablet (100 mg) by mouth daily    Benign essential hypertension       montelukast 10 MG tablet    SINGULAIR    30 tablet    TAKE 1 TABLET BY MOUTH EVERY EVENING    Seasonal allergic rhinitis, unspecified allergic rhinitis trigger       MULTIVITAMIN GUMMIES ADULT Chew     30 tablet    Take 2 tablets by mouth daily    Weight loss       PRILOSEC OTC PO      Take 20 mg by mouth daily    Benign essential hypertension

## 2018-04-16 NOTE — NURSING NOTE
"Chief Complaint   Patient presents with     URI     x4 days, chest congestion, cough, diarrhea, body aches, chills and fatigue.     Refill Request     Toprol and Albuterol Inhaler.       Initial /60 (BP Location: Right arm, Patient Position: Chair, Cuff Size: Adult Regular)  Pulse 86  Temp 99.1  F (37.3  C) (Tympanic)  Ht 5' 6\" (1.676 m)  Wt 202 lb (91.6 kg)  SpO2 97%  BMI 32.6 kg/m2 Estimated body mass index is 32.6 kg/(m^2) as calculated from the following:    Height as of this encounter: 5' 6\" (1.676 m).    Weight as of this encounter: 202 lb (91.6 kg).  Medication Reconciliation: complete   RAINA HOWELL      "

## 2018-04-24 ENCOUNTER — HEALTH MAINTENANCE LETTER (OUTPATIENT)
Age: 53
End: 2018-04-24

## 2018-04-26 ENCOUNTER — OFFICE VISIT (OUTPATIENT)
Dept: FAMILY MEDICINE | Facility: OTHER | Age: 53
End: 2018-04-26
Attending: FAMILY MEDICINE
Payer: COMMERCIAL

## 2018-04-26 VITALS
TEMPERATURE: 98.8 F | HEIGHT: 66 IN | SYSTOLIC BLOOD PRESSURE: 110 MMHG | HEART RATE: 79 BPM | WEIGHT: 201.8 LBS | RESPIRATION RATE: 12 BRPM | DIASTOLIC BLOOD PRESSURE: 70 MMHG | OXYGEN SATURATION: 96 % | BODY MASS INDEX: 32.43 KG/M2

## 2018-04-26 DIAGNOSIS — I10 ESSENTIAL HYPERTENSION, BENIGN: ICD-10-CM

## 2018-04-26 PROCEDURE — 99213 OFFICE O/P EST LOW 20 MIN: CPT | Performed by: FAMILY MEDICINE

## 2018-04-26 RX ORDER — AMLODIPINE BESYLATE 5 MG/1
5 TABLET ORAL DAILY
Qty: 30 TABLET | Refills: 3 | Status: SHIPPED | OUTPATIENT
Start: 2018-04-26 | End: 2018-08-24

## 2018-04-26 ASSESSMENT — PAIN SCALES - GENERAL: PAINLEVEL: NO PAIN (0)

## 2018-04-26 NOTE — NURSING NOTE
"Chief Complaint   Patient presents with     Hypertension       Initial /70 (BP Location: Right arm, Patient Position: Sitting, Cuff Size: Adult Large)  Pulse 79  Temp 98.8  F (37.1  C) (Tympanic)  Resp 12  Ht 5' 6\" (1.676 m)  Wt 201 lb 12.8 oz (91.5 kg)  SpO2 96%  BMI 32.57 kg/m2 Estimated body mass index is 32.57 kg/(m^2) as calculated from the following:    Height as of this encounter: 5' 6\" (1.676 m).    Weight as of this encounter: 201 lb 12.8 oz (91.5 kg).  Medication Reconciliation: complete   Noreen Bartlett    "

## 2018-04-26 NOTE — PROGRESS NOTES
"  SUBJECTIVE:   Yvonne Llanos is a 52 year old female who presents to clinic today for the following health issues:      Hypertension Follow-up      Outpatient blood pressures are not being checked.    Low Salt Diet: no added salt      Amount of exercise or physical activity: 6-7 days/week for an average of 15-30 minutes    Problems taking medications regularly: No    Medication side effects: feeling run down and swollen feet, thinks it's from the [facility]    Diet: low salt        MS-needs handicapped sticker.  Fall risk and uses cane when it strikes her to do so.     Problem list and histories reviewed & adjusted, as indicated.  Additional history: as documented    As above.     Reviewed and updated as needed this visit by clinical staff  Tobacco  Allergies  Meds  Med Hx  Surg Hx  Fam Hx  Soc Hx      Reviewed and updated as needed this visit by Provider         ROS:  Constitutional, HEENT, cardiovascular, pulmonary, gi and gu systems are negative, except as otherwise noted.    OBJECTIVE:                                                    /70 (BP Location: Right arm, Patient Position: Sitting, Cuff Size: Adult Large)  Pulse 79  Temp 98.8  F (37.1  C) (Tympanic)  Resp 12  Ht 5' 6\" (1.676 m)  Wt 201 lb 12.8 oz (91.5 kg)  SpO2 96%  BMI 32.57 kg/m2  Body mass index is 32.57 kg/(m^2).   GENERAL APPEARANCE: Alert, no acute distress  CV: regular rate and rhythm, no murmur, rub or gallop  RESP: lungs clear to auscultation bilaterally  ABDOMEN: normal bowel sounds, soft, nontender, no hepatosplenomegaly or other masses  SKIN: no suspicious lesions or rashes to visualized skin  NEURO: Alert, oriented x 3, speech and mentation normal         ASSESSMENT/PLAN:                                                    1. Essential hypertension, benign  Decrease amlodipine to 5mg due to good control of bp and recurrence of tarsal tunnel sx and swelling int he legs.  3 month f/u and sooner if bp is elevated.    - " amLODIPine (NORVASC) 5 MG tablet; Take 1 tablet (5 mg) by mouth daily  Dispense: 30 tablet; Refill: 3  - Lipid Profile (Chol, Trig, HDL, LDL calc); Future  - Comprehensive metabolic panel (BMP + Alb, Alk Phos, ALT, AST, Total. Bili, TP); Future      Follow up with Provider - 3 months.    I did the permanent sticker for the MS and fall risk and use of cane intermittently.       Cholo Adame MD  [facility]

## 2018-04-26 NOTE — MR AVS SNAPSHOT
After Visit Summary   4/26/2018    Yvonne Llanos    MRN: [id number]           Patient Information     Date Of Birth          1965        Visit Information        Provider Department      4/26/2018 1:15 PM Cholo Adame MD [facility]        Today's Diagnoses     Essential hypertension, benign          Care Instructions    F/u with ongoing concerns.           Follow-ups after your visit        Future tests that were ordered for you today     Open Future Orders        Priority Expected Expires Ordered    Lipid Profile (Chol, Trig, HDL, LDL calc) Routine  10/26/2018 4/26/2018    Comprehensive metabolic panel (BMP + Alb, Alk Phos, ALT, AST, Total. Bili, TP) Routine  10/26/2018 4/26/2018            Who to contact     If you have questions or need follow up information about today's clinic visit or your schedule please contact [facility] directly at [phone].  Normal or non-critical lab and imaging results will be communicated to you by MyChart, letter or phone within 4 business days after the clinic has received the results. If you do not hear from us within 7 days, please contact the clinic through Hotelcloudhart or phone. If you have a critical or abnormal lab result, we will notify you by phone as soon as possible.  Submit refill requests through FangTooth Studios or call your pharmacy and they will forward the refill request to us. Please allow 3 business days for your refill to be completed.          Additional Information About Your Visit        MyChart Information     FangTo[employer] Studios gives you secure access to your electronic health record. If you see a primary care provider, you can also send messages to your care team and make appointments. If you have questions, please call your primary care clinic.  If you do not have a primary care provider, please call [phone] and they will assist you.        Care EveryWhere ID     This is your Care EveryWhere ID. This could be used by  "other organizations to access your [place] medical records  [id number]        Your Vitals Were     Pulse Temperature Respirations Height Pulse Oximetry BMI (Body Mass Index)    79 98.8  F (37.1  C) (Tympanic) 12 5' 6\" (1.676 m) 96% 32.57 kg/m2       Blood Pressure from Last 3 Encounters:   04/26/18 110/70   04/16/18 120/60   03/28/18 (!) 138/100    Weight from Last 3 Encounters:   04/26/18 201 lb 12.8 oz (91.5 kg)   04/16/18 202 lb (91.6 kg)   03/28/18 202 lb (91.6 kg)                 Today's Medication Changes          These changes are accurate as of 4/26/18  1:21 PM.  If you have any questions, ask your nurse or doctor.               These medicines have changed or have updated prescriptions.        Dose/Directions    amLODIPine 5 MG tablet   Commonly known as:  NORVASC   This may have changed:    - medication strength  - how much to take   Used for:  Essential hypertension, benign   Changed by:  Cholo Adame MD        Dose:  5 mg   Take 1 tablet (5 mg) by mouth daily   Quantity:  30 tablet   Refills:  3            Where to get your medicines      These medications were sent to [facility] [address] [place] [address] [address]  AT [facility] [address]  [address], [facility] [address]     Phone:  [phone]     amLODIPine 5 MG tablet                Primary Care Provider Office Phone # Fax #    Cholo Adame [phone]       [address] [place] [address]        Equal Access to Services     [facility] AH: Hadii rodrigue ku hadasho Soomaali, waaxda luqadaha, qaybta kaalmada adeegyada, rufino sanon. So [facility] [phone].    ATENCIÓN: Si habla español, tiene a moya disposición servicios gratuitos de asistencia lingüística. Llame al [phone].    We comply with applicable federal civil rights laws and [place] laws. We do not discriminate on the basis of race, color, national origin, age, disability, sex, sexual orientation, or gender " identity.            Thank you!     Thank you for choosing [facility]  for your care. Our goal is always to provide you with excellent care. Hearing back from our patients is one way we can continue to improve our services. Please take a few minutes to complete the written survey that you may receive in the mail after your visit with us. Thank you!             Your Updated Medication List - Protect others around you: Learn how to safely use, store and throw away your medicines at www.disposemymeds.org.          This list is accurate as of 4/26/18  1:21 PM.  Always use your most recent med list.                   Brand Name Dispense Instructions for use Diagnosis    albuterol 108 (90 Base) MCG/ACT Inhaler    PROAIR HFA/PROVENTIL HFA/VENTOLIN HFA    1 Inhaler    Inhale 2 puffs into the lungs every 6 hours as needed for shortness of breath / dyspnea or wheezing    Acute bronchitis, unspecified organism       amLODIPine 5 MG tablet    NORVASC    30 tablet    Take 1 tablet (5 mg) by mouth daily    Essential hypertension, benign       amoxicillin-clavulanate 875-125 MG per tablet    AUGMENTIN    20 tablet    Take 1 tablet by mouth 2 times daily    Acute maxillary sinusitis, recurrence not specified       AUBAGIO 14 MG tablet   Generic drug:  teriflunomide      Take 1 tablet (14 mg) by mouth daily Only available through select specialty pharmacies    MS (multiple sclerosis) (H)       baclofen 20 MG tablet    LIORESAL    30 tablet    Take 1 tablet (20 mg) by mouth 2 times daily    MS (multiple sclerosis) (H)       butalbital-acetaminophen-caffeine -40 MG per tablet    FIORICET/ESGIC    36 tablet    TAKE 1 TABLET BY MOUTH THREE TIMES WEEKLY AS NEEDED FOR HEADACHE    Other migraine without status migrainosus, not intractable       cholecalciferol 5000 units Caps capsule    vitamin D3     Take 1 capsule (5,000 Units) by mouth daily        folic acid-vit B6-vit B12 0.8-10-0.115 MG Tabs per tablet    FOLGARD      Take 1 tablet by mouth daily        gabapentin 400 MG capsule    NEURONTIN    360 capsule    Take 1 capsule (400 mg) by mouth 3 times daily    MS (multiple sclerosis) (H)       losartan 100 MG tablet    COZAAR    90 tablet    TAKE 1 TABLET BY MOUTH ONCE DAILY.    Essential hypertension, benign       metoprolol succinate 100 MG 24 hr tablet    TOPROL-XL    90 tablet    Take 1 tablet (100 mg) by mouth daily    Benign essential hypertension       montelukast 10 MG tablet    SINGULAIR    30 tablet    TAKE 1 TABLET BY MOUTH EVERY EVENING    Seasonal allergic rhinitis, unspecified allergic rhinitis trigger       MULTIVITAMIN GUMMIES ADULT Chew     30 tablet    Take 2 tablets by mouth daily    Weight loss       PRILOSEC OTC PO      Take 20 mg by mouth daily    Benign essential hypertension

## 2018-05-15 DIAGNOSIS — I10 ESSENTIAL HYPERTENSION, BENIGN: ICD-10-CM

## 2018-05-15 LAB
ALBUMIN SERPL-MCNC: 3.8 G/DL (ref 3.4–5)
ALP SERPL-CCNC: 79 U/L (ref 40–150)
ALT SERPL W P-5'-P-CCNC: 22 U/L (ref 0–50)
ANION GAP SERPL CALCULATED.3IONS-SCNC: 8 MMOL/L (ref 3–14)
AST SERPL W P-5'-P-CCNC: 14 U/L (ref 0–45)
BILIRUB SERPL-MCNC: 0.4 MG/DL (ref 0.2–1.3)
BUN SERPL-MCNC: 15 MG/DL (ref 7–30)
CALCIUM SERPL-MCNC: 9.3 MG/DL (ref 8.5–10.1)
CHLORIDE SERPL-SCNC: 106 MMOL/L (ref 94–109)
CHOLEST SERPL-MCNC: 244 MG/DL
CO2 SERPL-SCNC: 27 MMOL/L (ref 20–32)
CREAT SERPL-MCNC: 0.74 MG/DL (ref 0.52–1.04)
GFR SERPL CREATININE-BSD FRML MDRD: 82 ML/MIN/1.7M2
GLUCOSE SERPL-MCNC: 82 MG/DL (ref 70–99)
HDLC SERPL-MCNC: 64 MG/DL
LDLC SERPL CALC-MCNC: 147 MG/DL
NONHDLC SERPL-MCNC: 180 MG/DL
POTASSIUM SERPL-SCNC: 3.9 MMOL/L (ref 3.4–5.3)
PROT SERPL-MCNC: 7.5 G/DL (ref 6.8–8.8)
SODIUM SERPL-SCNC: 141 MMOL/L (ref 133–144)
TRIGL SERPL-MCNC: 163 MG/DL

## 2018-05-15 PROCEDURE — 36415 COLL VENOUS BLD VENIPUNCTURE: CPT | Performed by: FAMILY MEDICINE

## 2018-05-15 PROCEDURE — 80053 COMPREHEN METABOLIC PANEL: CPT | Performed by: FAMILY MEDICINE

## 2018-05-15 PROCEDURE — 80061 LIPID PANEL: CPT | Performed by: FAMILY MEDICINE

## 2018-05-24 DIAGNOSIS — G35 MS (MULTIPLE SCLEROSIS) (H): ICD-10-CM

## 2018-05-24 RX ORDER — GABAPENTIN 400 MG/1
CAPSULE ORAL
Qty: 360 CAPSULE | Refills: 3 | Status: SHIPPED | OUTPATIENT
Start: 2018-05-24

## 2018-05-24 NOTE — TELEPHONE ENCOUNTER
Gabapentin  Last Written Prescription Date:  5/4/17 ended 8/2/17  Last Fill Qty:  360, # Refills:  3  Last Office Visit:  4/26/18

## 2018-07-19 ENCOUNTER — OFFICE VISIT (OUTPATIENT)
Dept: FAMILY MEDICINE | Facility: OTHER | Age: 53
End: 2018-07-19
Attending: FAMILY MEDICINE
Payer: COMMERCIAL

## 2018-07-19 VITALS
OXYGEN SATURATION: 98 % | DIASTOLIC BLOOD PRESSURE: 78 MMHG | WEIGHT: 199 LBS | HEART RATE: 50 BPM | BODY MASS INDEX: 33.15 KG/M2 | TEMPERATURE: 98.7 F | HEIGHT: 65 IN | RESPIRATION RATE: 16 BRPM | SYSTOLIC BLOOD PRESSURE: 120 MMHG

## 2018-07-19 DIAGNOSIS — I10 ESSENTIAL HYPERTENSION, BENIGN: ICD-10-CM

## 2018-07-19 DIAGNOSIS — Z00.00 ROUTINE GENERAL MEDICAL EXAMINATION AT A HEALTH CARE FACILITY: Primary | ICD-10-CM

## 2018-07-19 DIAGNOSIS — G35 MS (MULTIPLE SCLEROSIS) (H): ICD-10-CM

## 2018-07-19 DIAGNOSIS — Z23 NEED FOR VACCINATION: ICD-10-CM

## 2018-07-19 PROCEDURE — 90670 PCV13 VACCINE IM: CPT | Performed by: FAMILY MEDICINE

## 2018-07-19 PROCEDURE — 99396 PREV VISIT EST AGE 40-64: CPT | Mod: 25 | Performed by: FAMILY MEDICINE

## 2018-07-19 PROCEDURE — 90471 IMMUNIZATION ADMIN: CPT | Performed by: FAMILY MEDICINE

## 2018-07-19 ASSESSMENT — PAIN SCALES - GENERAL: PAINLEVEL: NO PAIN (0)

## 2018-07-19 NOTE — PROGRESS NOTES
SUBJECTIVE:   CC: Yvonne Llanos is an 52 year old woman who presents for preventive health visit.     Healthy Habits:    Do you get at least three servings of calcium containing foods daily (dairy, green leafy vegetables, etc.)? yes    Amount of exercise or daily activities, outside of work: 3 day(s) per week, walks for 2o min    Problems taking medications regularly No    Medication side effects: No    Have you had an eye exam in the past two years? yes    Do you see a dentist twice per year? yes    Do you have sleep apnea, excessive snoring or daytime drowsiness?no  FIT - DNA: earlier this year (approximately), by this group: [facility]onda, results were negative.         Hypertension Follow-up      Outpatient blood pressures are not being checked.    Low Salt Diet: no added salt      Today's PHQ-2 Score:   PHQ-2 ( 1999 Pfizer) 5/4/2017 7/17/2013   Q1: Little interest or pleasure in doing things - 0   Q2: Feeling down, depressed or hopeless - 0   PHQ-2 Score - 0   Q1: Little interest or pleasure in doing things Not at all -   Q2: Feeling down, depressed or hopeless Not at all -   PHQ-2 Score 0 -       Abuse: Current or Past(Physical, Sexual or Emotional)- No  Do you feel safe in your environment - Yes    Social History   Substance Use Topics     Smoking status: Never Smoker     Smokeless tobacco: Never Used     Alcohol use 0.0 oz/week     0 Standard drinks or equivalent per week      Comment: rarely     If you drink alcohol do you typically have >3 drinks per day or >7 drinks per week? No                     Reviewed orders with patient.  Reviewed health maintenance and updated orders accordingly - Yes  Labs reviewed in EPIC    Mammo discussed, not appropriate for or declined by this patient.    Pertinent mammograms are reviewed under the imaging tab.  History of abnormal Pap smear: Status post benign hysterectomy. Health Maintenance and Surgical History updated.     Reviewed and updated as needed this visit by  "clinical staff  Tobacco  Allergies  Meds  Med Hx  Surg Hx  Fam Hx  Soc Hx        Reviewed and updated as needed this visit by Provider        Past Medical History:   Diagnosis Date     Asthma     mild[place] issues since      Dyslipidemia 2011     Endometriosis      Fibromyalgia      Hypertension 2011     Migraines      Other abnormal glucose 2011     Pancreatitis due to biliary obstruction     improved after cholecystectomy     Seasonal Allergies 2011     Sleep apnea     on CPAP pressure of 8, resolved s/p  gastric sleeve      Past Surgical History:   Procedure Laterality Date      x2       CHOLECYSTECTOMY       Hysterectomy with BSO      Endometriosis     LAPAROSCOPIC GASTRIC SLEEVE  2014    [facility]     LAPAROSCOPY      D&C, exploratory,  placental tissue adhered to uterus     rotator cuff tendon surgery Left      estrada       ROS:  CONSTITUTIONAL: NEGATIVE for fever, chills, change in weight  INTEGUMENTARY/SKIN: NEGATIVE for worrisome rashes, moles or lesions  EYES: NEGATIVE for vision changes or irritation  ENT: NEGATIVE for ear, mouth and throat problems  RESP: NEGATIVE for significant cough or SOB  CV: NEGATIVE for chest pain, palpitations or peripheral edema  GI: NEGATIVE for nausea, abdominal pain, heartburn, or change in bowel habits  : NEGATIVE for unusual urinary or vaginal symptoms. No vaginal bleeding.  MUSCULOSKELETAL: NEGATIVE for significant arthralgias or myalgia  NEURO: NEGATIVE for weakness, dizziness or paresthesias  PSYCHIATRIC: NEGATIVE for changes in mood or affect     OBJECTIVE:   /78 (BP Location: Left arm, Patient Position: Sitting, Cuff Size: Adult Large)  Pulse 50  Temp 98.7  F (37.1  C) (Tympanic)  Resp 16  Ht 5' 5\" (1.651 m)  Wt 199 lb (90.3 kg)  SpO2 98%  BMI 33.12 kg/m2  EXAM:  GENERAL APPEARANCE: healthy, alert and no distress  EYES: Eyes grossly normal to inspection, PERRL " "and conjunctivae and sclerae normal  HENT: ear canals and TM's normal, nose and mouth without ulcers or lesions, oropharynx clear and oral mucous membranes moist  NECK: no adenopathy, no asymmetry, masses, or scars and thyroid normal to palpation  RESP: lungs clear to auscultation - no rales, rhonchi or wheezes  CV: regular rate and rhythm, normal S1 S2, no S3 or S4, no murmur, click or rub, no peripheral edema and peripheral pulses strong  ABDOMEN: soft, nontender, no hepatosplenomegaly, no masses and bowel sounds normal  MS: no musculoskeletal defects are noted and gait is age appropriate without ataxia  SKIN: no suspicious lesions or rashes  NEURO: Normal strength and tone, sensory exam grossly normal, mentation intact and speech normal  PSYCH: mentation appears normal and affect normal/bright    Diagnostic Test Results:  none     ASSESSMENT/PLAN:       ICD-10-CM    1. Routine general medical examination at a health care facility Z00.00    2. Essential hypertension, benign I10    3. MS (multiple sclerosis) (H) G35        COUNSELING:   Reviewed preventive health counseling, as reflected in patient instructions    BP Readings from Last 1 Encounters:   07/19/18 120/78     Estimated body mass index is 33.12 kg/(m^2) as calculated from the following:    Height as of this encounter: 5' 5\" (1.651 m).    Weight as of this encounter: 199 lb (90.3 kg).      Weight management plan: Discussed healthy diet and exercise guidelines and patient will follow up in 12 months in clinic to re-evaluate.     reports that she has never smoked. She has never used smokeless tobacco.      Counseling Resources:  ATP IV Guidelines  Pooled Cohorts Equation Calculator  Breast Cancer Risk Calculator  FRAX Risk Assessment  ICSI Preventive Guidelines  Dietary Guidelines for Americans, 2010  USDA's MyPlate  ASA Prophylaxis  Lung CA Screening    Cholo Adame MD  [facility]  "

## 2018-07-19 NOTE — NURSING NOTE
"Chief Complaint   Patient presents with     Hypertension     Physical       Initial /78 (BP Location: Left arm, Patient Position: Sitting, Cuff Size: Adult Large)  Pulse 50  Temp 98.7  F (37.1  C) (Tympanic)  Resp 16  Ht 5' 5\" (1.651 m)  Wt 199 lb (90.3 kg)  SpO2 98%  BMI 33.12 kg/m2 Estimated body mass index is 33.12 kg/(m^2) as calculated from the following:    Height as of this encounter: 5' 5\" (1.651 m).    Weight as of this encounter: 199 lb (90.3 kg).  Medication Reconciliation: complete    Evonne Meneses LPN  "

## 2018-07-19 NOTE — MR AVS SNAPSHOT
After Visit Summary   7/19/2018    Yvonne Llanos    MRN: [id number]           Patient Information     Date Of Birth          1965        Visit Information        Provider Department      7/19/2018 9:15 AM Cholo Adame MD [facility]        Today's Diagnoses     Routine general medical examination at a health care facility    -  1    Essential hypertension, benign        MS (multiple sclerosis) (H)        Need for vaccination          Care Instructions      Preventive Health Recommendations  Female Ages 50 - 64    Yearly exam: See your health care provider every year in order to  o Review health changes.   o Discuss preventive care.    o Review your medicines if your doctor has prescribed any.      Get a Pap test every three years (unless you have an abnormal result and your provider advises testing more often).    If you get Pap tests with HPV test, you only need to test every 5 years, unless you have an abnormal result.     You do not need a Pap test if your uterus was removed (hysterectomy) and you have not had cancer.    You should be tested each year for STDs (sexually transmitted diseases) if you're at risk.     Have a mammogram every 1 to 2 years.    Have a colonoscopy at age 50, or have a yearly FIT test (stool test). These exams screen for colon cancer.      Have a cholesterol test every 5 years, or more often if advised.    Have a diabetes test (fasting glucose) every three years. If you are at risk for diabetes, you should have this test more often.     If you are at risk for osteoporosis (brittle bone disease), think about having a bone density scan (DEXA).    Shots: Get a flu shot each year. Get a tetanus shot every 10 years.    Nutrition:     Eat at least 5 servings of fruits and vegetables each day.    Eat whole-grain bread, whole-wheat pasta and brown rice instead of white grains and rice.    Get adequate Calcium and Vitamin D.     Lifestyle    Exercise at  "least 150 minutes a week (30 minutes a day, 5 days a week). This will help you control your weight and prevent disease.    Limit alcohol to one drink per day.    No smoking.     Wear sunscreen to prevent skin cancer.     See your dentist every six months for an exam and cleaning.    See your eye doctor every 1 to 2 years.            Follow-ups after your visit        Who to contact     If you have questions or need follow up information about today's clinic visit or your schedule please contact [facility] directly at [phone].  Normal or non-critical lab and imaging results will be communicated to you by [employer], letter or phone within 4 business days after the clinic has received the results. If you do not hear from us within 7 days, please contact the clinic through [employer] or phone. If you have a critical or abnormal lab result, we will notify you by phone as soon as possible.  Submit refill requests through [employer] or call your pharmacy and they will forward the refill request to us. Please allow 3 business days for your refill to be completed.          Additional Information About Your Visit        [employer] Information     [employer] gives you secure access to your electronic health record. If you see a primary care provider, you can also send messages to your care team and make appointments. If you have questions, please call your primary care clinic.  If you do not have a primary care provider, please call [phone] and they will assist you.        Care EveryWhere ID     This is your Care EveryWhere ID. This could be used by other organizations to access your [facility] medical records  [id number]        Your Vitals Were     Pulse Temperature Respirations Height Pulse Oximetry BMI (Body Mass Index)    50 98.7  F (37.1  C) (Tympanic) 16 5' 5\" (1.651 m) 98% 33.12 kg/m2       Blood Pressure from Last 3 Encounters:   07/19/18 120/78   04/26/18 110/70   04/16/18 120/60    Weight from Last 3 " Encounters:   07/19/18 199 lb (90.3 kg)   04/26/18 201 lb 12.8 oz (91.5 kg)   04/16/18 202 lb (91.6 kg)              We Performed the Following     ADMIN: Vaccine, Initial (07187)     Pneumococcal vaccine 13 valent PCV13 IM (Prevnar) [54713]        Primary Care Provider Office Phone # Fax #    Cholo Adame [phone]       [address] [place] [address]        Equal Access to Services     YUAN MELISSA AH: Hadii aad ku hadasho Soomaali, waaxda luqadaha, qaybta kaalmada adeegyada, waxay idiin hayaan adeeg khwilmer skaggs . So [facility] [place] [phone].    ATENCIÓN: Si habla español, tiene a moya disposición servicios gratuitos de asistencia lingüística. Llame al [phone].    We comply with applicable federal civil rights laws and [place] laws. We do not discriminate on the basis of race, color, national origin, age, disability, sex, sexual orientation, or gender identity.            Thank you!     Thank you for choosing [facility]  for your care. Our goal is always to provide you with excellent care. Hearing back from our patients is one way we can continue to improve our services. Please take a few minutes to complete the written survey that you may receive in the mail after your visit with us. Thank you!             Your Updated Medication List - Protect others around you: Learn how to safely use, store and throw away your medicines at www.disposemymeds.org.          This list is accurate as of 7/19/18  1:02 PM.  Always use your most recent med list.                   Brand Name Dispense Instructions for use Diagnosis    albuterol 108 (90 Base) MCG/ACT Inhaler    PROAIR HFA/PROVENTIL HFA/VENTOLIN HFA    1 Inhaler    Inhale 2 puffs into the lungs every 6 hours as needed for shortness of breath / dyspnea or wheezing    Acute bronchitis, unspecified organism       amLODIPine 5 MG tablet    NORVASC    30 tablet    Take 1 tablet (5 mg) by mouth daily    Essential hypertension,  benign       baclofen 20 MG tablet    LIORESAL    30 tablet    Take 1 tablet (20 mg) by mouth 2 times daily    MS (multiple sclerosis) (H)       butalbital-acetaminophen-caffeine -40 MG per tablet    FIORICET/ESGIC    36 tablet    TAKE 1 TABLET BY MOUTH THREE TIMES WEEKLY AS NEEDED FOR HEADACHE    Other migraine without status migrainosus, not intractable       cholecalciferol 5000 units Caps capsule    vitamin D3     Take 1 capsule (5,000 Units) by mouth daily        folic acid-vit B6-vit B12 0.8-10-0.115 MG Tabs per tablet    FOLGARD     Take 1 tablet by mouth daily        gabapentin 400 MG capsule    NEURONTIN    360 capsule    TAKE ONE CAPSULE BY MOUTH THREE TIMES DAILY    MS (multiple sclerosis) (H)       losartan 100 MG tablet    COZAAR    90 tablet    TAKE 1 TABLET BY MOUTH EVERY DAY    Essential hypertension, benign       metoprolol succinate 100 MG 24 hr tablet    TOPROL-XL    90 tablet    Take 1 tablet (100 mg) by mouth daily    Benign essential hypertension       montelukast 10 MG tablet    SINGULAIR    30 tablet    TAKE 1 TABLET BY MOUTH EVERY EVENING    Seasonal allergic rhinitis, unspecified allergic rhinitis trigger       MULTIVITAMIN GUMMIES ADULT Chew     30 tablet    Take 2 tablets by mouth daily    Weight loss       PRILOSEC OTC PO      Take 20 mg by mouth daily    Benign essential hypertension

## 2018-07-19 NOTE — PROGRESS NOTES
"  SUBJECTIVE:   Yvonne Llanos is a 52 year old female who presents to clinic today for the following health issues:  {Provider please address medication reconciliation discrepancies--rooming staff please delete if no med/rec issues}    Hypertension Follow-up      Outpatient blood pressures {ISCHECKIN}    Low Salt Diet: { :089847::\"no added salt\"}      Amount of exercise or physical activity: {Exercise frequency days per week:901402}    Problems taking medications regularly: {Med Problems:321808::\"No\"}    Medication side effects: {CHRONIC MED SIDE EFFECTS:180584::\"none\"}    Diet: { :933887}        {additional problems for provider to add:096696}    Problem list and histories reviewed & adjusted, as indicated.  Additional history: {NONE - AS DOCUMENTED:329786::\"as documented\"}    {HIST REVIEW/ LINKS 2:537513}    Reviewed and updated as needed this visit by clinical staff       Reviewed and updated as needed this visit by Provider         {PROVIDER CHARTING PREFERENCE:769136}  "

## 2018-07-26 ENCOUNTER — TELEPHONE (OUTPATIENT)
Dept: FAMILY MEDICINE | Facility: OTHER | Age: 53
End: 2018-07-26

## 2018-07-26 DIAGNOSIS — G43.809 OTHER MIGRAINE WITHOUT STATUS MIGRAINOSUS, NOT INTRACTABLE: ICD-10-CM

## 2018-07-26 DIAGNOSIS — J30.2 SEASONAL ALLERGIC RHINITIS, UNSPECIFIED CHRONICITY, UNSPECIFIED TRIGGER: Primary | ICD-10-CM

## 2018-07-26 RX ORDER — MONTELUKAST SODIUM 10 MG/1
TABLET ORAL
Qty: 30 TABLET | Refills: 5 | Status: SHIPPED | OUTPATIENT
Start: 2018-07-26 | End: 2022-03-28

## 2018-07-26 NOTE — TELEPHONE ENCOUNTER
Yvonne states Dr Adame was going to refill her Singulair and Butalbital during her appt on July 19.  Please send to [facility] in [place].  Thank you

## 2018-07-26 NOTE — TELEPHONE ENCOUNTER
She will need to have ordered by Dr. Adame. Nothing in note. I cannot see when he has ordered her pain med before.

## 2018-07-31 RX ORDER — BUTALBITAL, ACETAMINOPHEN AND CAFFEINE 50; 325; 40 MG/1; MG/1; MG/1
TABLET ORAL
Qty: 36 TABLET | Refills: 3 | Status: SHIPPED | OUTPATIENT
Start: 2018-07-31 | End: 2019-08-31

## 2018-08-24 DIAGNOSIS — I10 ESSENTIAL HYPERTENSION, BENIGN: ICD-10-CM

## 2018-08-24 RX ORDER — AMLODIPINE BESYLATE 5 MG/1
TABLET ORAL
Qty: 30 TABLET | Refills: 8 | Status: SHIPPED | OUTPATIENT
Start: 2018-08-24 | End: 2018-09-12 | Stop reason: ALTCHOICE

## 2018-09-10 ENCOUNTER — TELEPHONE (OUTPATIENT)
Dept: FAMILY MEDICINE | Facility: OTHER | Age: 53
End: 2018-09-10

## 2018-09-10 NOTE — TELEPHONE ENCOUNTER
Jocelyn no appt until 09/12/18 at 3pm, arriving at 2:45pm. She may be able to see Francine Palacios sooner, or could go to  if sooner appt needed.

## 2018-09-10 NOTE — TELEPHONE ENCOUNTER
8:14 AM    Reason for Call: OVERBOOK    Patient is having the following symptoms: pain in foot for 2 days.    The patient is requesting an appointment for 09/10/18 with .    Was an appointment offered for this call? No  If yes : Appointment type              Date    Preferred method for responding to this message: Telephone Call  What is your phone number ?[phone]    If we cannot reach you directly, may we leave a detailed response at the number you provided? Yes    Can this message wait until your PCP/provider returns, if unavailable today? Not applicable, pcp is in     [facility]

## 2018-09-12 ENCOUNTER — OFFICE VISIT (OUTPATIENT)
Dept: FAMILY MEDICINE | Facility: OTHER | Age: 53
End: 2018-09-12
Attending: FAMILY MEDICINE
Payer: COMMERCIAL

## 2018-09-12 ENCOUNTER — RADIANT APPOINTMENT (OUTPATIENT)
Dept: GENERAL RADIOLOGY | Facility: OTHER | Age: 53
End: 2018-09-12
Attending: FAMILY MEDICINE
Payer: COMMERCIAL

## 2018-09-12 VITALS
BODY MASS INDEX: 33.82 KG/M2 | WEIGHT: 203 LBS | HEART RATE: 67 BPM | DIASTOLIC BLOOD PRESSURE: 70 MMHG | TEMPERATURE: 98.4 F | OXYGEN SATURATION: 98 % | SYSTOLIC BLOOD PRESSURE: 112 MMHG | HEIGHT: 65 IN

## 2018-09-12 DIAGNOSIS — M79.671 RIGHT FOOT PAIN: ICD-10-CM

## 2018-09-12 DIAGNOSIS — I10 ESSENTIAL HYPERTENSION, BENIGN: ICD-10-CM

## 2018-09-12 DIAGNOSIS — M79.671 RIGHT FOOT PAIN: Primary | ICD-10-CM

## 2018-09-12 PROCEDURE — 73630 X-RAY EXAM OF FOOT: CPT | Mod: TC

## 2018-09-12 PROCEDURE — 99213 OFFICE O/P EST LOW 20 MIN: CPT | Performed by: FAMILY MEDICINE

## 2018-09-12 ASSESSMENT — PAIN SCALES - GENERAL: PAINLEVEL: SEVERE PAIN (6)

## 2018-09-12 NOTE — MR AVS SNAPSHOT
After Visit Summary   9/12/2018    Yvonne Llanos    MRN: [id number]           Patient Information     Date Of Birth          1965        Visit Information        Provider Department      9/12/2018 3:00 PM Cholo Adame MD [facility]        Today's Diagnoses     Right foot pain    -  1    Essential hypertension, benign          Care Instructions    F/u with ongoing concerns.             Follow-ups after your visit        Your next 10 appointments already scheduled     Sep 19, 2018  3:45 PM CDT   (Arrive by 3:30 PM)   SHORT with Cholo Adame MD   [facility] ([facility] )    [address]  [facility] [address]   [phone]              Who to contact     If you have questions or need follow up information about today's clinic visit or your schedule please contact [facility] directly at [phone].  Normal or non-critical lab and imaging results will be communicated to you by MyChart, letter or phone within 4 business days after the clinic has received the results. If you do not hear from us within 7 days, please contact the clinic through MyChart or phone. If you have a critical or abnormal lab result, we will notify you by phone as soon as possible.  Submit refill requests through [employer] or call your pharmacy and they will forward the refill request to us. Please allow 3 business days for your refill to be completed.          Additional Information About Your Visit        MyChart Information     [employer] gives you secure access to your electronic health record. If you see a primary care provider, you can also send messages to your care team and make appointments. If you have questions, please call your primary care clinic.  If you do not have a primary care provider, please call [phone] and they will assist you.        Care EveryWhere ID     This is your Care EveryWhere ID. This could be used by other  "organizations to access your [facility] medical records  [id number]        Your Vitals Were     Pulse Temperature Height Pulse Oximetry BMI (Body Mass Index)       67 98.4  F (36.9  C) (Tympanic) 5' 5\" (1.651 m) 98% 33.78 kg/m2        Blood Pressure from Last 3 Encounters:   09/12/18 112/70   07/19/18 120/78   04/26/18 110/70    Weight from Last 3 Encounters:   09/12/18 203 lb (92.1 kg)   07/19/18 199 lb (90.3 kg)   04/26/18 201 lb 12.8 oz (91.5 kg)                 Today's Medication Changes          These changes are accurate as of 9/12/18  3:31 PM.  If you have any questions, ask your nurse or doctor.               Stop taking these medicines if you haven't already. Please contact your care team if you have questions.     amLODIPine 5 MG tablet   Commonly known as:  NORVASC   Stopped by:  Cholo Adame MD                    Primary Care Provider Office Phone # Fax #    Cholo Adame -[phone]       [address] [place] [address]        Equal Access to Services     [facility] AH: Hadii aad ku hadasho Soomaali, waaxda luqadaha, qaybta kaalmada adeegyada, waxay ninain haysharonn nichole skaggs . So [facility] [phone].    ATENCIÓN: Si habla español, tiene a moya disposición servicios gratuitos de asistencia lingüística. Llame al [phone].    We comply with applicable federal civil rights laws and [place] laws. We do not discriminate on the basis of race, color, national origin, age, disability, sex, sexual orientation, or gender identity.            Thank you!     Thank you for choosing [facility]  for your care. Our goal is always to provide you with excellent care. Hearing back from our patients is one way we can continue to improve our services. Please take a few minutes to complete the written survey that you may receive in the mail after your visit with us. Thank you!             Your Updated Medication List - Protect others around you: Learn how to safely " use, store and throw away your medicines at www.disposemymeds.org.          This list is accurate as of 9/12/18  3:31 PM.  Always use your most recent med list.                   Brand Name Dispense Instructions for use Diagnosis    albuterol 108 (90 Base) MCG/ACT inhaler    PROAIR HFA/PROVENTIL HFA/VENTOLIN HFA    1 Inhaler    Inhale 2 puffs into the lungs every 6 hours as needed for shortness of breath / dyspnea or wheezing    Acute bronchitis, unspecified organism       baclofen 20 MG tablet    LIORESAL    30 tablet    Take 1 tablet (20 mg) by mouth 2 times daily    MS (multiple sclerosis) (H)       butalbital-acetaminophen-caffeine -40 MG per tablet    FIORICET/ESGIC    36 tablet    TAKE 1 TABLET BY MOUTH THREE TIMES WEEKLY AS NEEDED FOR HEADACHE    Other migraine without status migrainosus, not intractable       cholecalciferol 5000 units Caps capsule    vitamin D3     Take 1 capsule (5,000 Units) by mouth daily        folic acid-vit B6-vit B12 0.8-10-0.115 MG Tabs per tablet    FOLGARD     Take 1 tablet by mouth daily        gabapentin 400 MG capsule    NEURONTIN    360 capsule    TAKE ONE CAPSULE BY MOUTH THREE TIMES DAILY    MS (multiple sclerosis) (H)       losartan 100 MG tablet    COZAAR    90 tablet    TAKE 1 TABLET BY MOUTH EVERY DAY    Essential hypertension, benign       metoprolol succinate 100 MG 24 hr tablet    TOPROL-XL    90 tablet    Take 1 tablet (100 mg) by mouth daily    Benign essential hypertension       montelukast 10 MG tablet    SINGULAIR    30 tablet    TAKE 1 TABLET BY MOUTH EVERY EVENING    Seasonal allergic rhinitis, unspecified chronicity, unspecified trigger       MULTIVITAMIN GUMMIES ADULT Chew     30 tablet    Take 2 tablets by mouth daily    Weight loss       PRILOSEC OTC PO      Take 20 mg by mouth daily    Benign essential hypertension

## 2018-09-12 NOTE — PROGRESS NOTES
SUBJECTIVE:   Yvonne Llanos is a 52 year old female who presents to clinic today for the following health issues:        Musculoskeletal problem/pain      Duration: 4 months    Description  Location: Right foot    Intensity:  mild    Accompanying signs and symptoms: tingling and swelling    History  Previous similar problem: no   Previous evaluation:  none    Precipitating or alleviating factors:  Trauma or overuse: YES  Aggravating factors include: standing, walking, climbing stairs and overuse    Therapies tried and outcome: ice      PROBLEMS TO ADD ON...    Problem list and histories reviewed & adjusted, as indicated.  Additional history: hx of tarsal tunnel and plantar fasciitis.  Relieved with injections in past.  Having similar sx now but foot not as numb in the toes.  Sx prominent since starting amlodipine.     Patient Active Problem List   Diagnosis     Hyperlipidemia LDL goal <130     Insulin resistance     Essential hypertension, benign     Rotator cuff tendonitis, left     MS (multiple sclerosis) (H)     Fibromyalgia     Tear film insufficiency     Status post bariatric surgery     Past Surgical History:   Procedure Laterality Date      x2       CHOLECYSTECTOMY       Hysterectomy with BSO      Endometriosis     LAPAROSCOPIC GASTRIC SLEEVE  2014    [place]     LAPAROSCOPY      D&C, exploratory,  placental tissue adhered to uterus     rotator cuff tendon surgery Left      [facility]       Social History   Substance Use Topics     Smoking status: Never Smoker     Smokeless tobacco: Never Used     Alcohol use 0.0 oz/week     0 Standard drinks or equivalent per week      Comment: rarely     Family History   Problem Relation Age of Onset     Hypertension Mother      Heart Failure Mother      Congestive     Other - See Comments Mother      marcos danlos??  hypermobility     Lipids Father      Hyperlipidemia     Cancer Father      Skin     Hypertension Father      Prostate Cancer Father       Suicide Maternal Grandmother      Diabetes Maternal Grandfather      CLOTTING DISORDER Maternal Grandfather      Diabetes Paternal Grandmother      KIDNEY DISEASE Paternal Grandmother      Other - See Comments Paternal Grandfather      old age     Depression Brother      Diabetes Brother      type 2     Myocardial Infarction Brother 32     ETOHic, +tobacco     Peripheral Vascular Disease Brother      Hyperlipidemia Brother      Other - See Comments Sister      Post Partum DVT     Other Cancer Sister      retinal tear     Known Genetic Syndrome Daughter      marcoselza kimballs     Known Genetic Syndrome Daughter      marcos mitchell         Current Outpatient Prescriptions   Medication Sig Dispense Refill     albuterol (PROAIR HFA/PROVENTIL HFA/VENTOLIN HFA) 108 (90 Base) MCG/ACT Inhaler Inhale 2 puffs into the lungs every 6 hours as needed for shortness of breath / dyspnea or wheezing 1 Inhaler 3     baclofen (LIORESAL) 20 MG tablet Take 1 tablet (20 mg) by mouth 2 times daily 30 tablet 3     butalbital-acetaminophen-caffeine (FIORICET/ESGIC) -40 MG per tablet TAKE 1 TABLET BY MOUTH THREE TIMES WEEKLY AS NEEDED FOR HEADACHE 36 tablet 3     cholecalciferol (VITAMIN D3) 5000 UNITS CAPS capsule Take 1 capsule (5,000 Units) by mouth daily  0     folic acid-vit B6-vit B12 (FOLGARD) 0.8-10-0.115 MG TABS Take 1 tablet by mouth daily       gabapentin (NEURONTIN) 400 MG capsule TAKE ONE CAPSULE BY MOUTH THREE TIMES DAILY 360 capsule 3     losartan (COZAAR) 100 MG tablet TAKE 1 TABLET BY MOUTH EVERY DAY 90 tablet 2     metoprolol succinate (TOPROL-XL) 100 MG 24 hr tablet Take 1 tablet (100 mg) by mouth daily 90 tablet 1     montelukast (SINGULAIR) 10 MG tablet TAKE 1 TABLET BY MOUTH EVERY EVENING 30 tablet 5     Multiple Vitamins-Minerals (MULTIVITAMIN GUMMIES ADULT) CHEW Take 2 tablets by mouth daily 30 tablet 0     Omeprazole Magnesium (PRILOSEC OTC PO) Take 20 mg by mouth daily       Allergies   Allergen Reactions     Ace  "Inhibitors Cough     Amitriptyline Other (See Comments) and Nausea     Nightmares     Codeine      Headache and nausea      Flagyl [Metronidazole] Nausea     Morphine Nausea     Promethazine      nausea     Seasonal      Sulfa Drugs        Reviewed and updated as needed this visit by clinical staff       Reviewed and updated as needed this visit by Provider         ROS:  Constitutional, HEENT, cardiovascular, pulmonary, gi and gu systems are negative, except as otherwise noted.    OBJECTIVE:                                                    /70  Pulse 67  Temp 98.4  F (36.9  C) (Tympanic)  Ht 5' 5\" (1.651 m)  Wt 203 lb (92.1 kg)  SpO2 98%  BMI 33.78 kg/m2  Body mass index is 33.78 kg/(m^2).  GENERAL APPEARANCE: Alert, no acute distress  Right foot:  Slightly tender over anterior calcaneus on the plantar side.  Otherwise normal.   SKIN: no suspicious lesions or rashes to visualized skin  NEURO: Alert, oriented x 3, speech and mentation normal      Xray foot pending.      ASSESSMENT/PLAN:                                                    1. Right foot pain  Reviewed.  Suspect plantar fascia.  Consider tarsal tunnel with some edema from the norvasc.  Hold the norvasc.  1 week f/u.  Refer to podiatry with ongoing sx.    - XR FOOT RT G/E 3 VW (Clinic Performed); Future    2. Essential hypertension, benign  As above.  Holding norvasc and close f/u.           Cholo Adame MD  [facility]  "

## 2018-09-18 NOTE — PROGRESS NOTES
SUBJECTIVE:                                                    Yvonne Llanos is a 52 year old female who presents to clinic today for the following health issues:    Hypertension Follow-up      Outpatient blood pressures are not being checked.    Low Salt Diet: low salt      Amount of exercise or physical activity: None    Problems taking medications regularly: No    Medication side effects: none    Diet: regular (no restrictions)        Musculoskeletal problem/pain      Duration: 2 months    Description  Location: right foot    Intensity:  moderate    Accompanying signs and symptoms: tingling and swelling    History  Previous similar problem: YES  Previous evaluation:  x-ray    Precipitating or alleviating factors:  Trauma or overuse: no   Aggravating factors include: standing and walking    Therapies tried and outcome: ice      Problem list and histories reviewed & adjusted, as indicated.  Additional history: improved sx but ongoing.  We discussed options.  Very painful but again improved.  bp stable.     Patient Active Problem List   Diagnosis     Hyperlipidemia LDL goal <130     Insulin resistance     Essential hypertension, benign     Rotator cuff tendonitis, left     MS (multiple sclerosis) (H)     Fibromyalgia     Tear film insufficiency     Status post bariatric surgery     Past Surgical History:   Procedure Laterality Date      x2       CHOLECYSTECTOMY       Hysterectomy with BSO      Endometriosis     LAPAROSCOPIC GASTRIC SLEEVE  2014    [place]     LAPAROSCOPY      D&C, exploratory,  placental tissue adhered to uterus     rotator cuff tendon surgery Left      estraad       Social History   Substance Use Topics     Smoking status: Never Smoker     Smokeless tobacco: Never Used     Alcohol use 0.0 oz/week     0 Standard drinks or equivalent per week      Comment: rarely     Family History   Problem Relation Age of Onset     Hypertension Mother      Heart Failure Mother       Congestive     Other - See Comments Mother      marcos mitchell??  hypermobility     Lipids Father      Hyperlipidemia     Cancer Father      Skin     Hypertension Father      Prostate Cancer Father      Suicide Maternal Grandmother      Diabetes Maternal Grandfather      CLOTTING DISORDER Maternal Grandfather      Diabetes Paternal Grandmother      KIDNEY DISEASE Paternal Grandmother      Other - See Comments Paternal Grandfather      old age     Depression Brother      Diabetes Brother      type 2     Myocardial Infarction Brother 32     ETOHic, +tobacco     Peripheral Vascular Disease Brother      Hyperlipidemia Brother      Other - See Comments Sister      Post Partum DVT     Other Cancer Sister      retinal tear     Known Genetic Syndrome Daughter      marcos mitchell     Known Genetic Syndrome Daughter      marcos mitchell         Current Outpatient Prescriptions   Medication Sig Dispense Refill     albuterol (PROAIR HFA/PROVENTIL HFA/VENTOLIN HFA) 108 (90 Base) MCG/ACT Inhaler Inhale 2 puffs into the lungs every 6 hours as needed for shortness of breath / dyspnea or wheezing 1 Inhaler 3     baclofen (LIORESAL) 20 MG tablet Take 1 tablet (20 mg) by mouth 2 times daily 30 tablet 3     butalbital-acetaminophen-caffeine (FIORICET/ESGIC) -40 MG per tablet TAKE 1 TABLET BY MOUTH THREE TIMES WEEKLY AS NEEDED FOR HEADACHE 36 tablet 3     cholecalciferol (VITAMIN D3) 5000 UNITS CAPS capsule Take 1 capsule (5,000 Units) by mouth daily  0     folic acid-vit B6-vit B12 (FOLGARD) 0.8-10-0.115 MG TABS Take 1 tablet by mouth daily       gabapentin (NEURONTIN) 400 MG capsule TAKE ONE CAPSULE BY MOUTH THREE TIMES DAILY 360 capsule 3     losartan (COZAAR) 100 MG tablet TAKE 1 TABLET BY MOUTH EVERY DAY 90 tablet 2     metoprolol succinate (TOPROL-XL) 100 MG 24 hr tablet Take 1 tablet (100 mg) by mouth daily 90 tablet 1     montelukast (SINGULAIR) 10 MG tablet TAKE 1 TABLET BY MOUTH EVERY EVENING 30 tablet 5     Multiple  "Vitamins-Minerals (MULTIVITAMIN GUMMIES ADULT) CHEW Take 2 tablets by mouth daily 30 tablet 0     predniSONE (DELTASONE) 20 MG tablet Take 1 tablet (20 mg) by mouth 2 times daily 10 tablet 0     Allergies   Allergen Reactions     Ace Inhibitors Cough     Amitriptyline Other (See Comments) and Nausea     Nightmares     Codeine      Headache and nausea      Flagyl [Metronidazole] Nausea     Morphine Nausea     Promethazine      nausea     Seasonal      Sulfa Drugs        ROS:  Constitutional, HEENT, cardiovascular, pulmonary, gi and gu systems are negative, except as otherwise noted.    OBJECTIVE:                                                    /80  Pulse 59  Ht 5' 5\" (1.651 m)  Wt 206 lb (93.4 kg)  SpO2 97%  BMI 34.28 kg/m2  Body mass index is 34.28 kg/(m^2).  GENERAL APPEARANCE: Alert, no acute distress  MSK:  Pain in foot with palpation.    SKIN: no suspicious lesions or rashes to visualized skin  NEURO: Alert, oriented x 3, speech and mentation normal      Xray reviewed.      ASSESSMENT/PLAN:                                                    1. Plantar fasciitis  With some spurring.  Steroid burst, brief.  Podiatry referral.   - PODIATRY/FOOT & ANKLE SURGERY REFERRAL  - predniSONE (DELTASONE) 20 MG tablet; Take 1 tablet (20 mg) by mouth 2 times daily  Dispense: 10 tablet; Refill: 0    2. Tarsal tunnel syndrome of right side  As above.  History of.  Seemed flared with the amlodipine associated edema.  Now still ongoing.  Not sure if EMG is needed.  Steroid might help as well.  Sent the referral as well.    - PODIATRY/FOOT & ANKLE SURGERY REFERRAL  - predniSONE (DELTASONE) 20 MG tablet; Take 1 tablet (20 mg) by mouth 2 times daily  Dispense: 10 tablet; Refill: 0    3. Essential hypertension, benign  Stable.  Continue same.            Cholo Adame MD  [facility]      "

## 2018-09-19 ENCOUNTER — OFFICE VISIT (OUTPATIENT)
Dept: FAMILY MEDICINE | Facility: OTHER | Age: 53
End: 2018-09-19
Attending: FAMILY MEDICINE
Payer: COMMERCIAL

## 2018-09-19 VITALS
WEIGHT: 206 LBS | BODY MASS INDEX: 34.32 KG/M2 | SYSTOLIC BLOOD PRESSURE: 122 MMHG | HEART RATE: 59 BPM | HEIGHT: 65 IN | OXYGEN SATURATION: 97 % | DIASTOLIC BLOOD PRESSURE: 80 MMHG

## 2018-09-19 DIAGNOSIS — I10 ESSENTIAL HYPERTENSION, BENIGN: ICD-10-CM

## 2018-09-19 DIAGNOSIS — M72.2 PLANTAR FASCIITIS: Primary | ICD-10-CM

## 2018-09-19 DIAGNOSIS — G57.51 TARSAL TUNNEL SYNDROME OF RIGHT SIDE: ICD-10-CM

## 2018-09-19 PROCEDURE — 99213 OFFICE O/P EST LOW 20 MIN: CPT | Performed by: FAMILY MEDICINE

## 2018-09-19 RX ORDER — PREDNISONE 20 MG/1
20 TABLET ORAL 2 TIMES DAILY
Qty: 10 TABLET | Refills: 0 | Status: SHIPPED | OUTPATIENT
Start: 2018-09-19 | End: 2019-02-14

## 2018-09-19 ASSESSMENT — PAIN SCALES - GENERAL: PAINLEVEL: MILD PAIN (3)

## 2018-09-19 NOTE — MR AVS SNAPSHOT
After Visit Summary   9/19/2018    Yvonne Llanos    MRN: [id number]           Patient Information     Date Of Birth          1965        Visit Information        Provider Department      9/19/2018 3:45 PM Cholo Adame MD [facility]        Today's Diagnoses     Plantar fasciitis    -  1    Tarsal tunnel syndrome of right side        Essential hypertension, benign          Care Instructions    F/u with ongoing concerns.             Follow-ups after your visit        Additional Services     PODIATRY/FOOT & ANKLE SURGERY REFERRAL       Your provider has referred you to: Jared Martinez    Please be aware that coverage of these services is subject to the terms and limitations of your health insurance plan.  Call member services at your health plan with any benefit or coverage questions.      Please bring the following to your appointment:  >>   Any x-rays, CTs or MRIs which have been performed.  Contact the facility where they were done to arrange for  prior to your scheduled appointment.    >>   List of current medications   >>   This referral request   >>   Any documents/labs given to you for this referral                  Your next 10 appointments already scheduled     Sep 26, 2018  8:00 AM CDT   (Arrive by 7:45 AM)   [facility] with Lesley Medrano DPM   [facility]    [address] [place] [id number] [phone]              Who to contact     If you have questions or need follow up information about today's clinic visit or your schedule please contact [facility] directly at [phone].  Normal or non-critical lab and imaging results will be communicated to you by MyChart, letter or phone within 4 business days after the clinic has received the results. If you do not hear from us within 7 days, please contact the clinic through MyChart or phone. If you  "have a critical or abnormal lab result, we will notify you by phone as soon as possible.  Submit refill requests through [employer] or call your pharmacy and they will forward the refill request to us. Please allow 3 business days for your refill to be completed.          Additional Information About Your Visit        [employer] Information     [employer] gives you secure access to your electronic health record. If you see a primary care provider, you can also send messages to your care team and make appointments. If you have questions, please call your primary care clinic.  If you do not have a primary care provider, please call [phone] and they will assist you.        Care EveryWhere ID     This is your Care EveryWhere ID. This could be used by other organizations to access your [place] medical records  [id number]        Your Vitals Were     Pulse Height Pulse Oximetry BMI (Body Mass Index)          59 5' 5\" (1.651 m) 97% 34.28 kg/m2         Blood Pressure from Last 3 Encounters:   09/19/18 122/80   09/12/18 112/70   07/19/18 120/78    Weight from Last 3 Encounters:   09/19/18 206 lb (93.4 kg)   09/12/18 203 lb (92.1 kg)   07/19/18 199 lb (90.3 kg)              We Performed the Following     PODIATRY/FOOT & ANKLE SURGERY REFERRAL          Today's Medication Changes          These changes are accurate as of 9/19/18  5:04 PM.  If you have any questions, ask your nurse or doctor.               Start taking these medicines.        Dose/Directions    predniSONE 20 MG tablet   Commonly known as:  DELTASONE   Used for:  Plantar fasciitis, Tarsal tunnel syndrome of right side   Started by:  Cholo Adame MD        Dose:  20 mg   Take 1 tablet (20 mg) by mouth 2 times daily   Quantity:  10 tablet   Refills:  0         Stop taking these medicines if you haven't already. Please contact your care team if you have questions.     PRILOSEC OTC PO   Stopped by:  Cholo Adame MD                Where to get your medicines "      These medications were sent to [id number] - [place], [place] - [address] AT [facility] [address]  [address], [place] [address]     Phone:  [phone]     predniSONE 20 MG tablet                Primary Care Provider Office Phone # Fax #    Cholo Adame [phone]       [address] [place] [address] E  [facility] [address]        Equal Access to Services     YUAN MELISSA : Hadii aad ku hadasho Soomaali, waaxda luqadaha, qaybta kaalmada adeegyada, waxay idiin hayaan adeeg kharash la'magdalena . So [facility] [phone].    ATENCIÓN: Si habla español, tiene a moya disposición servicios gratuitos de asistencia lingüística. [facility] [phone].    We comply with applicable federal civil rights laws and [place] laws. We do not discriminate on the basis of race, color, national origin, age, disability, sex, sexual orientation, or gender identity.            Thank you!     Thank you for choosing [facility]  for your care. Our goal is always to provide you with excellent care. Hearing back from our patients is one way we can continue to improve our services. Please take a few minutes to complete the written survey that you may receive in the mail after your visit with us. Thank you!             Your Updated Medication List - Protect others around you: Learn how to safely use, store and throw away your medicines at www.disposemymeds.org.          This list is accurate as of 9/19/18  5:04 PM.  Always use your most recent med list.                   Brand Name Dispense Instructions for use Diagnosis    albuterol 108 (90 Base) MCG/ACT inhaler    PROAIR HFA/PROVENTIL HFA/VENTOLIN HFA    1 Inhaler    Inhale 2 puffs into the lungs every 6 hours as needed for shortness of breath / dyspnea or wheezing    Acute bronchitis, unspecified organism       baclofen 20 MG tablet    LIORESAL    30 tablet    Take 1 tablet (20 mg) by mouth 2 times daily    MS (multiple sclerosis) (H)        butalbital-acetaminophen-caffeine -40 MG per tablet    FIORICET/ESGIC    36 tablet    TAKE 1 TABLET BY MOUTH THREE TIMES WEEKLY AS NEEDED FOR HEADACHE    Other migraine without status migrainosus, not intractable       cholecalciferol 5000 units Caps capsule    vitamin D3     Take 1 capsule (5,000 Units) by mouth daily        folic acid-vit B6-vit B12 0.8-10-0.115 MG Tabs per tablet    FOLGARD     Take 1 tablet by mouth daily        gabapentin 400 MG capsule    NEURONTIN    360 capsule    TAKE ONE CAPSULE BY MOUTH THREE TIMES DAILY    MS (multiple sclerosis) (H)       losartan 100 MG tablet    COZAAR    90 tablet    TAKE 1 TABLET BY MOUTH EVERY DAY    Essential hypertension, benign       metoprolol succinate 100 MG 24 hr tablet    TOPROL-XL    90 tablet    Take 1 tablet (100 mg) by mouth daily    Benign essential hypertension       montelukast 10 MG tablet    SINGULAIR    30 tablet    TAKE 1 TABLET BY MOUTH EVERY EVENING    Seasonal allergic rhinitis, unspecified chronicity, unspecified trigger       MULTIVITAMIN GUMMIES ADULT Chew     30 tablet    Take 2 tablets by mouth daily    Weight loss       predniSONE 20 MG tablet    DELTASONE    10 tablet    Take 1 tablet (20 mg) by mouth 2 times daily    Plantar fasciitis, Tarsal tunnel syndrome of right side

## 2018-09-19 NOTE — NURSING NOTE
"Chief Complaint   Patient presents with     Hypertension       Initial /80  Pulse 59  Ht 5' 5\" (1.651 m)  Wt 206 lb (93.4 kg)  SpO2 97%  BMI 34.28 kg/m2 Estimated body mass index is 34.28 kg/(m^2) as calculated from the following:    Height as of this encounter: 5' 5\" (1.651 m).    Weight as of this encounter: 206 lb (93.4 kg).  Medication Reconciliation: complete    Cathy Greene LPN  "

## 2018-09-26 ENCOUNTER — OFFICE VISIT (OUTPATIENT)
Dept: PODIATRY | Facility: OTHER | Age: 53
End: 2018-09-26
Attending: PODIATRIST
Payer: COMMERCIAL

## 2018-09-26 VITALS
WEIGHT: 205 LBS | BODY MASS INDEX: 34.11 KG/M2 | TEMPERATURE: 99.3 F | OXYGEN SATURATION: 97 % | HEART RATE: 56 BPM | SYSTOLIC BLOOD PRESSURE: 122 MMHG | DIASTOLIC BLOOD PRESSURE: 82 MMHG

## 2018-09-26 DIAGNOSIS — G57.51 TARSAL TUNNEL SYNDROME OF RIGHT SIDE: ICD-10-CM

## 2018-09-26 DIAGNOSIS — M72.2 PLANTAR FASCIITIS: ICD-10-CM

## 2018-09-26 PROCEDURE — 99203 OFFICE O/P NEW LOW 30 MIN: CPT | Performed by: PODIATRIST

## 2018-09-26 ASSESSMENT — PAIN SCALES - GENERAL: PAINLEVEL: NO PAIN (0)

## 2018-09-26 NOTE — PATIENT INSTRUCTIONS
- Calf stretches against the wall (try to accumulate as close to an hour of stretching on each leg as possible)  - Ice foot daily with a frozen water bottle daily  - If trying any new device on the foot, slowly adjust to it  - Wear compression socks daily. Apply first thing in the morning and remove at bedtime.  - Wear stability shoes: Bends at the toe, has a solid midfoot shank and a solid heel contour.     Return in two weeks--if pain has not improved, will inject the most tender area of the foot prior to your vacation.

## 2018-09-26 NOTE — MR AVS SNAPSHOT
After Visit Summary   9/26/2018    Yvonne Llanos    MRN: [id number]           Patient Information     Date Of Birth          1965        Visit Information        Provider Department      9/26/2018 8:00 AM Lesley Medrano DPM [facility]        Today's Diagnoses     Plantar fasciitis        Tarsal tunnel syndrome of right side          Care Instructions    - Calf stretches against the wall (try to accumulate as close to an hour of stretching on each leg as possible)  - Ice foot daily with a frozen water bottle daily  - If trying any new device on the foot, slowly adjust to it  - Wear compression socks daily. Apply first thing in the morning and remove at bedtime.  - Wear stability shoes: Bends at the toe, has a solid midfoot shank and a solid heel contour.     Return in two weeks--if pain has not improved, will inject the most tender area of the foot prior to your vacation.            Follow-ups after your visit        Your next 10 appointments already scheduled     Oct 10, 2018  8:15 AM CDT   (Arrive by 8:00 AM)   Return Visit with Lesley Medrano DPM   [facility] )    [address] [place] [id number] [phone]              Who to contact     If you have questions or need follow up information about today's clinic visit or your schedule please contact [facility] directly at [phone].  Normal or non-critical lab and imaging results will be communicated to you by MyChart, letter or phone within 4 business days after the clinic has received the results. If you do not hear from us within 7 days, please contact the clinic through MyChart or phone. If you have a critical or abnormal lab result, we will notify you by phone as soon as possible.  Submit refill requests through [employer] or call your pharmacy and they will forward the refill request to us. Please allow 3  business days for your refill to be completed.          Additional Information About Your Visit        MyChart Information     [employer] gives you secure access to your electronic health record. If you see a primary care provider, you can also send messages to your care team and make appointments. If you have questions, please call your primary care clinic.  If you do not have a primary care provider, please call [phone] and they will assist you.        Care EveryWhere ID     This is your Care EveryWhere ID. This could be used by other organizations to access your [place] medical records  [id number]        Your Vitals Were     Pulse Temperature Pulse Oximetry BMI (Body Mass Index)          56 99.3  F (37.4  C) (Tympanic) 97% 34.11 kg/m2         Blood Pressure from Last 3 Encounters:   09/26/18 122/82   09/19/18 122/80   09/12/18 112/70    Weight from Last 3 Encounters:   09/26/18 205 lb (93 kg)   09/19/18 206 lb (93.4 kg)   09/12/18 203 lb (92.1 kg)              Today, you had the following     No orders found for display       Primary Care Provider Office Phone # Fax #    Cholo Adame [phone]       [address] [place] [address]        Equal Access to Services     YUAN MELISSA : Hadii aad ku hadasho Soomaali, waaxda luqadaha, qaybta kaalmada adeegyada, waxay tommy haysharonn nichole cooper lazoltan sanon. So [facility] [phone].    ATENCIÓN: Si habla español, tiene a moya disposición servicios gratuitos de asistencia lingüística. Llame al [phone].    We comply with applicable federal civil rights laws and [place] laws. We do not discriminate on the basis of race, color, national origin, age, disability, sex, sexual orientation, or gender identity.            Thank you!     Thank you for choosing [facility]  for your care. Our goal is always to provide you with excellent care. Hearing back from our patients is one way we can continue to improve our services.  Please take a few minutes to complete the written survey that you may receive in the mail after your visit with us. Thank you!             Your Updated Medication List - Protect others around you: Learn how to safely use, store and throw away your medicines at www.disposemymeds.org.          This list is accurate as of 9/26/18  8:34 AM.  Always use your most recent med list.                   Brand Name Dispense Instructions for use Diagnosis    albuterol 108 (90 Base) MCG/ACT inhaler    PROAIR HFA/PROVENTIL HFA/VENTOLIN HFA    1 Inhaler    Inhale 2 puffs into the lungs every 6 hours as needed for shortness of breath / dyspnea or wheezing    Acute bronchitis, unspecified organism       baclofen 20 MG tablet    LIORESAL    30 tablet    Take 1 tablet (20 mg) by mouth 2 times daily    MS (multiple sclerosis) (H)       butalbital-acetaminophen-caffeine -40 MG per tablet    FIORICET/ESGIC    36 tablet    TAKE 1 TABLET BY MOUTH THREE TIMES WEEKLY AS NEEDED FOR HEADACHE    Other migraine without status migrainosus, not intractable       cholecalciferol 5000 units Caps capsule    vitamin D3     Take 1 capsule (5,000 Units) by mouth daily        folic acid-vit B6-vit B12 0.8-10-0.115 MG Tabs per tablet    FOLGARD     Take 1 tablet by mouth daily        gabapentin 400 MG capsule    NEURONTIN    360 capsule    TAKE ONE CAPSULE BY MOUTH THREE TIMES DAILY    MS (multiple sclerosis) (H)       losartan 100 MG tablet    COZAAR    90 tablet    TAKE 1 TABLET BY MOUTH EVERY DAY    Essential hypertension, benign       metoprolol succinate 100 MG 24 hr tablet    TOPROL-XL    90 tablet    Take 1 tablet (100 mg) by mouth daily    Benign essential hypertension       montelukast 10 MG tablet    SINGULAIR    30 tablet    TAKE 1 TABLET BY MOUTH EVERY EVENING    Seasonal allergic rhinitis, unspecified chronicity, unspecified trigger       MULTIVITAMIN GUMMIES ADULT Chew     30 tablet    Take 2 tablets by mouth daily    Weight loss        predniSONE 20 MG tablet    DELTASONE    10 tablet    Take 1 tablet (20 mg) by mouth 2 times daily    Plantar fasciitis, Tarsal tunnel syndrome of right side

## 2018-09-26 NOTE — PROGRESS NOTES
Chief complaint: Patient presents with:  Musculoskeletal Problem: right foot pain      History of Present Illness: This 52 year old female with MS and fibromyalgia is seen at the request of Deep for evaluation and suggestions of management of RIGHT foot pain. Pain has been present since around  when she started taking a new blood pressure medication caused lower extremity edema. The dose was cut in half which helped the pain decrease but the pain flared up again about five or six weeks ago after a day of increased walking at a craft fair. Her pain has been present ever since then. Walking on the foot causes pain at a +3 our of 10. Going on a walk causes more pain and sitting removes the pain.   About 4-5 years ago, she had tarsal tunnel and plantar fascia in this same foot. She was treated by Dr. Sparks at [facility] and was placed in a CAM boot for a long time--she also had some injections and was given an in-office insert that decreased the pain. She has not had any pain since that time until now.  Since the recent pain started, she has been on a 5-day dose of Prednisone (prescribed by her PCP). This has decreased her pain but it is still present. She leaves for vacation around 10/18/2018 and she will be hiking in [place], so she is looking for some relief prior to her trip. No further pedal complaints today.    /90 (BP Location: Left arm, Patient Position: Sitting, Cuff Size: Adult Regular)  Pulse 56  Temp 99.3  F (37.4  C) (Tympanic)  Wt 205 lb (93 kg)  SpO2 97%  BMI 34.11 kg/m2    Patient Active Problem List   Diagnosis     Hyperlipidemia LDL goal <130     Insulin resistance     Essential hypertension, benign     Rotator cuff tendonitis, left     MS (multiple sclerosis) (H)     Fibromyalgia     Tear film insufficiency     Status post bariatric surgery       Past Surgical History:   Procedure Laterality Date      x2       CHOLECYSTECTOMY       Hysterectomy with BSO       Endometriosis     LAPAROSCOPIC GASTRIC SLEEVE  1/2014    jun     LAPAROSCOPY  1997    D&C, exploratory,  placental tissue adhered to uterus     rotator cuff tendon surgery Left  2015    [facility]       Current Outpatient Prescriptions   Medication     albuterol (PROAIR HFA/PROVENTIL HFA/VENTOLIN HFA) 108 (90 Base) MCG/ACT Inhaler     baclofen (LIORESAL) 20 MG tablet     butalbital-acetaminophen-caffeine (FIORICET/ESGIC) -40 MG per tablet     cholecalciferol (VITAMIN D3) 5000 UNITS CAPS capsule     folic acid-vit B6-vit B12 (FOLGARD) 0.8-10-0.115 MG TABS     gabapentin (NEURONTIN) 400 MG capsule     losartan (COZAAR) 100 MG tablet     metoprolol succinate (TOPROL-XL) 100 MG 24 hr tablet     montelukast (SINGULAIR) 10 MG tablet     Multiple Vitamins-Minerals (MULTIVITAMIN GUMMIES ADULT) CHEW     predniSONE (DELTASONE) 20 MG tablet     No current facility-administered medications for this visit.           Allergies   Allergen Reactions     Ace Inhibitors Cough     Amitriptyline Other (See Comments) and Nausea     Nightmares     Codeine      Headache and nausea      Flagyl [Metronidazole] Nausea     Morphine Nausea     Promethazine      nausea     Seasonal      Sulfa Drugs        Family History   Problem Relation Age of Onset     Hypertension Mother      Heart Failure Mother      Congestive     Other - See Comments Mother      marcos danlos??  hypermobility     Lipids Father      Hyperlipidemia     Cancer Father      Skin     Hypertension Father      Prostate Cancer Father      Suicide Maternal Grandmother      Diabetes Maternal Grandfather      CLOTTING DISORDER Maternal Grandfather      Diabetes Paternal Grandmother      KIDNEY DISEASE Paternal Grandmother      Other - See Comments Paternal Grandfather      old age     Depression Brother      Diabetes Brother      type 2     Myocardial Infarction Brother 32     ETOHic, +tobacco     Peripheral Vascular Disease Brother      Hyperlipidemia Brother      Other - See  Comments Sister      Post Partum DVT     Other Cancer Sister      retinal tear     Known Genetic Syndrome Daughter      marcos mitchell     Known Genetic Syndrome Daughter      marcos mitchell       Social History     Social History     Marital status:      Spouse name: Scooter     Number of children: 3     Years of education: 16     Occupational History      [employer]     FULL-TIME     Social History Main Topics     Smoking status: Never Smoker     Smokeless tobacco: Never Used     Alcohol use 0.0 oz/week     0 Standard drinks or equivalent per week      Comment: rarely     Drug use: No     Sexual activity: Yes     Partners: Male     Other Topics Concern      Service No     Blood Transfusions Yes     PERMITS     Caffeine Concern Yes     1-2 coffee/day     Exercise Yes     walking 20 min daily     Seat Belt Yes     Self-Exams Yes     Parent/Sibling W/ Cabg, Mi Or Angioplasty Before 65f 55m? Yes     Social History Narrative     -- none    Exercise -- walking 30 min daily    Caffeine -- 1 coffee/daily       ROS: 10 point ROS neg other than the symptoms noted above in the HPI.  EXAM  Constitutional: healthy, alert and no distress    Psychiatric: mentation appears normal and affect normal/bright    VASCULAR:  -Dorsalis pedis pulse +2/4 b/l  -Posterior tibial pulse +2/4 b/l  -Capillary refill time < 3 seconds to b/l hallux  -Mild varicosities to b/l medial ankle and heel  DERM:  -Skin temperature, texture and turgor WNL b/l  -Toenails normotrophic x 10  MSK:  -Mild tenderness on palpation to RIGHT medial calcaneal tubercle and along tibial nerve course distal to the medial malleolus along the varicosities of the medial ankle  -Increased arch height while patient is NWB  -Muscle strength of ankles +5/5 for dorsiflexion, plantarflexion, ABDUction and ADDuction b/l  -Ankle joint passive ROM within normal limits except for dorsiflexion:    Dorsiflexion, RIGHT Straight knee 0  degrees    Dorsiflexion, LEFT Straight knee 0 degrees    RADIOGRAPHS RIGHT FOOT 09/12/2018  HISTORY: ; Right foot pain     COMPARISON:  None.     TECHNIQUE:  3 views of the right foot were obtained.     FINDINGS:  No fracture or dislocation is identified. The joint spaces  are preserved there are small spurs at the insertion of Achilles  tendon and plantar fascia into the calcaneus.         IMPRESSION: Calcaneal spurs      ============================================================    ASSESSMENT:  (M72.2) Plantar fasciitis    (G57.51) Tarsal tunnel syndrome of right side      PLAN:  -Patient evaluated and examined. Treatment options discussed with no educational barriers noted.  -Radiographs reviewed with patient.  -Educated patient on plantar fasciitis and tarsal tunnel syndrome. Patient has varicosities along her medial feet which often contribute to impingement on the tibial nerve when there is increased edema in the feet. Advised patient to wear compression socks daily, wear proper shoe gear, ice her feet and stretch her feet daily.  -Compression socks: Advised patient to wear compression socks all day (especially when working) to decrease LOWER EXTREMITY edema b/l. Educated patient about applying the socks first thing in the morning before getting out of bed and removing them at night when the feet are elevated in bed.  -Shoe Gear: Discussed proper shoe gear with patient. This involves wearing a stability shoe that bends at the toe, but has a solid midfoot shank and heel contour.   -Icing: Patient advised to ice plantar foot minimally once a day for ten minutes per foot with a frozen water bottle.   -Stretching: Stressed the importance of stretching the calf muscles to increase dorsiflexion ROM at the ankles. Educated patient on how this contributes to plantar fascia pain. If possible, patient should aim to stretch the calf muscles for a combined total of one hour per day.   -Night splint ordered - DME order.  Educated patient on proper use.  -Patient will try the above regimen over the next two weeks. If she still has pain, will consider an injection prior to her going on vacation around 10/18/2018.  -Patient in agreement with the above treatment plan and all of patient's questions were answered.      RTC 2 weeks--if little or no improvement in pain, will inject foot just prior to patient's vacation.        Lesley Medrano DPM

## 2018-09-26 NOTE — NURSING NOTE
"Chief Complaint   Patient presents with     Musculoskeletal Problem     right foot pain       Initial /90 (BP Location: Left arm, Patient Position: Sitting, Cuff Size: Adult Regular)  Pulse 56  Temp 99.3  F (37.4  C) (Tympanic)  Wt 205 lb (93 kg)  SpO2 97%  BMI 34.11 kg/m2 Estimated body mass index is 34.11 kg/(m^2) as calculated from the following:    Height as of 9/19/18: 5' 5\" (1.651 m).    Weight as of this encounter: 205 lb (93 kg).  Medication Reconciliation: complete    Aminta Valle LPN  "

## 2018-10-31 ENCOUNTER — OFFICE VISIT (OUTPATIENT)
Dept: PODIATRY | Facility: OTHER | Age: 53
End: 2018-10-31
Attending: PODIATRIST
Payer: COMMERCIAL

## 2018-10-31 VITALS
SYSTOLIC BLOOD PRESSURE: 124 MMHG | TEMPERATURE: 97.4 F | HEIGHT: 65 IN | BODY MASS INDEX: 34.16 KG/M2 | DIASTOLIC BLOOD PRESSURE: 72 MMHG | WEIGHT: 205 LBS

## 2018-10-31 DIAGNOSIS — G57.51 TARSAL TUNNEL SYNDROME OF RIGHT SIDE: ICD-10-CM

## 2018-10-31 DIAGNOSIS — M72.2 PLANTAR FASCIITIS: Primary | ICD-10-CM

## 2018-10-31 PROCEDURE — 99213 OFFICE O/P EST LOW 20 MIN: CPT | Mod: 25 | Performed by: PODIATRIST

## 2018-10-31 PROCEDURE — 20605 DRAIN/INJ JOINT/BURSA W/O US: CPT | Performed by: PODIATRIST

## 2018-10-31 RX ORDER — DEXAMETHASONE SODIUM PHOSPHATE 4 MG/ML
4 INJECTION, SOLUTION INTRA-ARTICULAR; INTRALESIONAL; INTRAMUSCULAR; INTRAVENOUS; SOFT TISSUE ONCE
Qty: 1 ML | Refills: 0 | OUTPATIENT
Start: 2018-10-31 | End: 2019-02-14

## 2018-10-31 RX ORDER — TRIAMCINOLONE ACETONIDE 40 MG/ML
40 INJECTION, SUSPENSION INTRA-ARTICULAR; INTRAMUSCULAR ONCE
Qty: 1 ML | Refills: 0 | OUTPATIENT
Start: 2018-10-31 | End: 2019-02-14

## 2018-10-31 ASSESSMENT — PAIN SCALES - GENERAL: PAINLEVEL: MODERATE PAIN (4)

## 2018-10-31 NOTE — NURSING NOTE
"Chief Complaint   Patient presents with     Musculoskeletal Problem     Right Foot Pain       Initial /72 (Cuff Size: Adult Large)  Temp 97.4  F (36.3  C) (Tympanic)  Ht 5' 5\" (1.651 m)  Wt 205 lb (93 kg)  BMI 34.11 kg/m2 Estimated body mass index is 34.11 kg/(m^2) as calculated from the following:    Height as of this encounter: 5' 5\" (1.651 m).    Weight as of this encounter: 205 lb (93 kg).  Medication Reconciliation: complete    Yaima Moore LPN    "

## 2018-10-31 NOTE — PROGRESS NOTES
The following medication was given:     MEDICATION: Dexamethasone  ROUTE: ID  SITE: right heel  DOSE: 4 mg  LOT #: [id number]  :  [employer]  EXPIRATION DATE:  11/19  NDC#: 99351-520-91       The following medication was given:     MEDICATION: Kenalog  ROUTE: ID  SITE: right heel  DOSE: 40 mg  LOT #: [id number]  :  [employer]  EXPIRATION DATE:  2/2019  NDC#: 5377-0133-01

## 2018-10-31 NOTE — MR AVS SNAPSHOT
After Visit Summary   10/31/2018    Yvonne Llanos    MRN: [id number]           Patient Information     Date Of Birth          1965        Visit Information        Provider Department      10/31/2018 2:45 PM Lesley Medrano DPM [facility]        Today's Diagnoses     Plantar fasciitis    -  1    Tarsal tunnel syndrome of right side           Follow-ups after your visit        Your next 10 appointments already scheduled     Nov 29, 2018  4:15 PM CST   (Arrive by 4:00 PM)   Return Visit with Lesley Medrano DPM   [facility] )    [address] [place] [id number] [phone]              Who to contact     If you have questions or need follow up information about today's clinic visit or your schedule please contact [facility] directly at [phone].  Normal or non-critical lab and imaging results will be communicated to you by MyChart, letter or phone within 4 business days after the clinic has received the results. If you do not hear from us within 7 days, please contact the clinic through DGSEhart or phone. If you have a critical or abnormal lab result, we will notify you by phone as soon as possible.  Submit refill requests through SSEV or call your pharmacy and they will forward the refill request to us. Please allow 3 business days for your refill to be completed.          Additional Information About Your Visit        MyChart Information     SSEV gives you secure access to your electronic health record. If you see a primary care provider, you can also send messages to your care team and make appointments. If you have questions, please call your primary care clinic.  If you do not have a primary care provider, please call [phone] and they will assist you.        Care EveryWhere ID     This is your Care EveryWhere ID. This could be used by other organizations  "to access your [facility] medical records  [id number]        Your Vitals Were     Temperature Height BMI (Body Mass Index)             97.4  F (36.3  C) (Tympanic) 5' 5\" (1.651 m) 34.11 kg/m2          Blood Pressure from Last 3 Encounters:   10/31/18 124/72   09/26/18 122/82   09/19/18 122/80    Weight from Last 3 Encounters:   10/31/18 205 lb (93 kg)   09/26/18 205 lb (93 kg)   09/19/18 206 lb (93.4 kg)              Today, you had the following     No orders found for display         Today's Medication Changes          These changes are accurate as of 10/31/18  5:49 PM.  If you have any questions, ask your nurse or doctor.               Start taking these medicines.        Dose/Directions    dexamethasone 4 MG/ML injection   Commonly known as:  DECADRON   Used for:  Plantar fasciitis, Tarsal tunnel syndrome of right side   Started by:  Lesley Medrano DPM        Dose:  4 mg   Inject 1 mL (4 mg) as directed once for 1 dose Use 4 mg or dose determined by provider for iontophoresis.   Quantity:  1 mL   Refills:  0       triamcinolone acetonide 40 MG/ML injection   Commonly known as:  KENALOG-40   Used for:  Plantar fasciitis, Tarsal tunnel syndrome of right side   Started by:  Lesley Medrano DPM        Dose:  40 mg   1 mL (40 mg) by INTRA-ARTICULAR route once for 1 dose   Quantity:  1 mL   Refills:  0            Where to get your medicines      Some of these will need a paper prescription and others can be bought over the counter.  Ask your nurse if you have questions.     You don't need a prescription for these medications     dexamethasone 4 MG/ML injection    triamcinolone acetonide 40 MG/ML injection                Primary Care Provider Office Phone # Fax #    Cholo Adame [phone]       [address] [place] [address]        Equal Access to Services     YUAN MELISSA AH: Hadii rodrigue mccauley Solisa, waaxda luqadaha, qaybta kaalmalisa wharton, rufino varela " kenneth skaggs ah. So [facility] [phone].    ATENCIÓN: Si hernando saleem, tiene a moya disposición servicios gratuitos de asistencia lingüística. Efren blanca [phone].    We comply with applicable federal civil rights laws and [place] laws. We do not discriminate on the basis of race, color, national origin, age, disability, sex, sexual orientation, or gender identity.            Thank you!     Thank you for choosing [facility]  for your care. Our goal is always to provide you with excellent care. Hearing back from our patients is one way we can continue to improve our services. Please take a few minutes to complete the written survey that you may receive in the mail after your visit with us. Thank you!             Your Updated Medication List - Protect others around you: Learn how to safely use, store and throw away your medicines at www.disposemymeds.org.          This list is accurate as of 10/31/18  5:49 PM.  Always use your most recent med list.                   Brand Name Dispense Instructions for use Diagnosis    albuterol 108 (90 Base) MCG/ACT inhaler    PROAIR HFA/PROVENTIL HFA/VENTOLIN HFA    1 Inhaler    Inhale 2 puffs into the lungs every 6 hours as needed for shortness of breath / dyspnea or wheezing    Acute bronchitis, unspecified organism       baclofen 20 MG tablet    LIORESAL    30 tablet    Take 1 tablet (20 mg) by mouth 2 times daily    MS (multiple sclerosis) (H)       butalbital-acetaminophen-caffeine -40 MG per tablet    FIORICET/ESGIC    36 tablet    TAKE 1 TABLET BY MOUTH THREE TIMES WEEKLY AS NEEDED FOR HEADACHE    Other migraine without status migrainosus, not intractable       cholecalciferol 5000 units Caps capsule    vitamin D3     Take 1 capsule (5,000 Units) by mouth daily        dexamethasone 4 MG/ML injection    DECADRON    1 mL    Inject 1 mL (4 mg) as directed once for 1 dose Use 4 mg or dose determined by provider for iontophoresis.    Plantar  fasciitis, Tarsal tunnel syndrome of right side       folic acid-vit B6-vit B12 0.8-10-0.115 MG Tabs per tablet    FOLGARD     Take 1 tablet by mouth daily        gabapentin 400 MG capsule    NEURONTIN    360 capsule    TAKE ONE CAPSULE BY MOUTH THREE TIMES DAILY    MS (multiple sclerosis) (H)       losartan 100 MG tablet    COZAAR    90 tablet    TAKE 1 TABLET BY MOUTH EVERY DAY    Essential hypertension, benign       metoprolol succinate 100 MG 24 hr tablet    TOPROL-XL    90 tablet    TAKE 1 TABLET BY MOUTH ONE TIME A DAY    Benign essential hypertension       montelukast 10 MG tablet    SINGULAIR    30 tablet    TAKE 1 TABLET BY MOUTH EVERY EVENING    Seasonal allergic rhinitis, unspecified chronicity, unspecified trigger       MULTIVITAMIN GUMMIES ADULT Chew     30 tablet    Take 2 tablets by mouth daily    Weight loss       order for DME     1 Device    Equipment being ordered: Please fit patient for and dispense a night splint.    Plantar fasciitis, Tarsal tunnel syndrome of right side       predniSONE 20 MG tablet    DELTASONE    10 tablet    Take 1 tablet (20 mg) by mouth 2 times daily    Plantar fasciitis, Tarsal tunnel syndrome of right side       triamcinolone acetonide 40 MG/ML injection    KENALOG-40    1 mL    1 mL (40 mg) by INTRA-ARTICULAR route once for 1 dose    Plantar fasciitis, Tarsal tunnel syndrome of right side

## 2018-10-31 NOTE — PROGRESS NOTES
Chief complaint: Patient presents with:  Musculoskeletal Problem: Right Foot Pain      History of Present Illness: This 52 year old female with MS and fibromyalgia is seen for follow-up management of RIGHT plantar fascia and tarsal tunnel pain. Pain has been present since around July, 2018 when she started taking a new blood pressure medication caused lower extremity edema. The dose was cut in half which helped the pain decrease but the pain flared up again about five or six weeks ago after a day of increased walking at a craft fair. Her pain has been present ever since then. Going on a walk causes more pain and sitting removes the pain.    Today she brought her night splint with her. She has been wearing it about five hours every other night. She also has a compression sock that wraps up to the level of the ankle but she is not wearing it daily because it is squeezing her distal 1st ray. She also ordered [place] arch supports (recommended by Dr. Sparks). She also found her old health record from her treatments with Dr. Sparks (starting 09/2012, Jan and March, 2013 with shots of Kenalog 40 for tarsal tunnel and plantar fascial pain). She is doing some stretches at home by holding her feet dorsiflexed while sitting. She thinks her foot is improved at today's visit and she had enough pain relief that she was able to go hiking in [place] last week. She still has some pain, however, and she is interested in an injection in the foot. A Kenalog 40 injection helped decrease her pain around five years ago. No further pedal complaints at this time.     Historically: About 4-5 years ago, she had tarsal tunnel and plantar fascia in this same foot. She was treated by Dr. Sparks at [facility] and was placed in a CAM boot for a long time--she also had two Kenalog 40 injections and was given an in-office insert that decreased the pain. She has not had any pain since that time until now.  Since the recent pain started, she has been on  "a 5-day dose of Prednisone (prescribed by her PCP). This has decreased her pain but it is still present.     /72 (Cuff Size: Adult Large)  Temp 97.4  F (36.3  C) (Tympanic)  Ht 5' 5\" (1.651 m)  Wt 205 lb (93 kg)  BMI 34.11 kg/m2    Patient Active Problem List   Diagnosis     Hyperlipidemia LDL goal <130     Insulin resistance     Essential hypertension, benign     Rotator cuff tendonitis, left     MS (multiple sclerosis) (H)     Fibromyalgia     Tear film insufficiency     Status post bariatric surgery       Past Surgical History:   Procedure Laterality Date      x2       CHOLECYSTECTOMY       Hysterectomy with BSO      Endometriosis     LAPAROSCOPIC GASTRIC SLEEVE  2014    jun     LAPAROSCOPY      D&C, exploratory,  placental tissue adhered to uterus     rotator cuff tendon surgery Left      [facility]       Current Outpatient Prescriptions   Medication     albuterol (PROAIR HFA/PROVENTIL HFA/VENTOLIN HFA) 108 (90 Base) MCG/ACT Inhaler     baclofen (LIORESAL) 20 MG tablet     butalbital-acetaminophen-caffeine (FIORICET/ESGIC) -40 MG per tablet     cholecalciferol (VITAMIN D3) 5000 UNITS CAPS capsule     folic acid-vit B6-vit B12 (FOLGARD) 0.8-10-0.115 MG TABS     gabapentin (NEURONTIN) 400 MG capsule     losartan (COZAAR) 100 MG tablet     metoprolol succinate (TOPROL-XL) 100 MG 24 hr tablet     montelukast (SINGULAIR) 10 MG tablet     Multiple Vitamins-Minerals (MULTIVITAMIN GUMMIES ADULT) CHEW     order for DME     predniSONE (DELTASONE) 20 MG tablet     No current facility-administered medications for this visit.           Allergies   Allergen Reactions     Ace Inhibitors Cough     Amitriptyline Other (See Comments) and Nausea     Nightmares     Codeine      Headache and nausea      Flagyl [Metronidazole] Nausea     Morphine Nausea     Promethazine      nausea     Seasonal      Sulfa Drugs        Family History   Problem Relation Age of Onset     Hypertension Mother      " Heart Failure Mother      Congestive     Other - See Comments Mother      marcos mitchell??  hypermobility     Lipids Father      Hyperlipidemia     Cancer Father      Skin     Hypertension Father      Prostate Cancer Father      Suicide Maternal Grandmother      Diabetes Maternal Grandfather      CLOTTING DISORDER Maternal Grandfather      Diabetes Paternal Grandmother      KIDNEY DISEASE Paternal Grandmother      Other - See Comments Paternal Grandfather      old age     Depression Brother      Diabetes Brother      type 2     Myocardial Infarction Brother 32     ETOHic, +tobacco     Peripheral Vascular Disease Brother      Hyperlipidemia Brother      Other - See Comments Sister      Post Partum DVT     Other Cancer Sister      retinal tear     Known Genetic Syndrome Daughter      marcos mitchell     Known Genetic Syndrome Daughter      marcos mitchell       Social History     Social History     Marital status:      Spouse name: Scooter     Number of children: 3     Years of education: 16     Occupational History      [employer]     FULL-TIME     Social History Main Topics     Smoking status: Never Smoker     Smokeless tobacco: Never Used     Alcohol use 0.0 oz/week     0 Standard drinks or equivalent per week      Comment: rarely     Drug use: No     Sexual activity: Yes     Partners: Male     Other Topics Concern      Service No     Blood Transfusions Yes     PERMITS     Caffeine Concern Yes     1-2 coffee/day     Exercise Yes     walking 20 min daily     Seat Belt Yes     Self-Exams Yes     Parent/Sibling W/ Cabg, Mi Or Angioplasty Before 65f 55m? Yes     Social History Narrative     -- none    Exercise -- walking 30 min daily    Caffeine -- 1 coffee/daily       ROS: 10 point ROS neg other than the symptoms noted above in the HPI.  EXAM  Constitutional: healthy, alert and no distress     Psychiatric: mentation appears normal and affect  normal/bright     VASCULAR:  -Dorsalis pedis pulse +2/4 b/l  -Posterior tibial pulse +2/4 b/l  -Capillary refill time < 3 seconds to b/l hallux  -Mild varicosities to b/l medial ankle and heel  DERM:  -Skin temperature, texture and turgor WNL b/l  -Toenails normotrophic x 10  MSK:  -Mild tenderness on palpation to RIGHT medial calcaneal tubercle and along tibial nerve course distal to the medial malleolus along the varicosities of the medial ankle  -Increased arch height while patient is NWB  -Muscle strength of ankles +5/5 for dorsiflexion, plantarflexion, ABDUction and ADDuction b/l  -Ankle joint passive ROM within normal limits except for dorsiflexion:    Dorsiflexion, RIGHT Straight knee 0 degrees    Dorsiflexion, LEFT Straight knee 0 degrees     RADIOGRAPHS RIGHT FOOT 09/12/2018  HISTORY: ; Right foot pain      COMPARISON:  None.      TECHNIQUE:  3 views of the right foot were obtained.      FINDINGS:  No fracture or dislocation is identified. The joint spaces  are preserved there are small spurs at the insertion of Achilles  tendon and plantar fascia into the calcaneus.          IMPRESSION: Calcaneal spurs     ============================================================     ASSESSMENT:  (M72.2) Plantar fasciitis     (G57.51) Tarsal tunnel syndrome of right side       PLAN:  -Patient evaluated and examined. Treatment options discussed with no educational barriers noted.  -Re-educated patient on plantar fasciitis and tarsal tunnel syndrome. Patient has varicosities along her medial feet which often contribute to impingement on the tibial nerve when there is increased edema in the feet. Advised patient to wear compression socks that go to the level of the knee. Her current compression device only goes from the ankle to the distal metatarsals. She may have more pain relief with knee-high compression socks.   -Shoe Gear: Reviewed proper shoe gear with patient. This involves wearing a stability shoe that bends at  the toe, but has a solid midfoot shank and heel contour. Patient says she has a pair of tennis shoes that meet these requirements, but she finds her slip-on dress shoes more comfortable ost of the time.   -Continue Stretching the calf muscles  -Continue Night splint as tolerated  -Injection of RIGHT foot plantar fascia insertion - patient's pain was more localized today to the medial tubercle of the calcaneus. Injection 3mL of 1:1:1 of Kenaolog 40: Dexamethasone 4m% Lidocaine plain. Obtained informed consent, reviewed potential complications (increased pain over the next 24 hours and plantar fascia / ligament / tendon rupture) and performed a time-out prior to injection. Patient in agreement with the injection.  -Patient would like to defer physical therapy at this time and start it if pain is not further decreased by next visit.  -Patient in agreement with the above treatment plan and all of patient's questions were answered.        RTC 1 month to consider physical therapy if foot pain is still present      Lesley Medrano DPM

## 2019-02-13 NOTE — PROGRESS NOTES
SUBJECTIVE:                                                    Yvonne Llanos is a 53 year old female who presents to clinic today for the following health issues:    Musculoskeletal problem/pain      Duration: follow up    Description  Location: total back and joints    Intensity:  None currently    Accompanying signs and symptoms: numbness in feet, back, face ,legs and sometimes hands. None currently.    History  Previous similar problem: YES- ongoing.  Previous evaluation:  x-ray and MRI    Precipitating or alleviating factors:  Trauma or overuse: no   Aggravating factors include: overuse, driving, prolonged walking, or stress. Sometimes the weather    Therapies tried and outcome: heat, stretching and chiropractor    Skin check      Duration: last 2 months it has raised. Has been there al while but always flat.    Description (location/character/radiation): right side above waist band of pants    Intensity:  moderate    Accompanying signs and symptoms: has become raised over about 2 months.     History (similar episodes/previous evaluation): None    Precipitating or alleviating factors: None    Therapies tried and outcome: one removed on the neck and some growths in the past removed.       PROBLEMS TO ADD ON...    Problem list and histories reviewed & adjusted, as indicated.  Additional history: raised mole right flank.  Wants to get off the toprol.  Fatigue is prominent.      Patient Active Problem List   Diagnosis     Hyperlipidemia LDL goal <130     Insulin resistance     Essential hypertension, benign     Rotator cuff tendonitis, left     MS (multiple sclerosis) (H)     Fibromyalgia     Tear film insufficiency     Status post bariatric surgery     Past Surgical History:   Procedure Laterality Date      x2       CHOLECYSTECTOMY       HYSTERECTOMY TOTAL ABDOMINAL, BILATERAL SALPINGO-OOPHORECTOMY, COMBINED  2001    Endometriosis     LAPAROSCOPIC GASTRIC SLEEVE  2014    [place]   1997    D&C, exploratory,  placental tissue adhered to uterus     rotator cuff tendon surgery Left  2015    [facility]       Social History     Tobacco Use     Smoking status: Never Smoker     Smokeless tobacco: Never Used   Substance Use Topics     Alcohol use: Yes     Alcohol/week: 0.0 oz     Comment: rarely     Family History   Problem Relation Age of Onset     Hypertension Mother      Heart Failure Mother         Congestive     Other - See Comments Mother         marcos mitchell??  hypermobility     Lipids Father         Hyperlipidemia     Cancer Father         Skin     Hypertension Father      Prostate Cancer Father      Suicide Maternal Grandmother      Diabetes Maternal Grandfather      Clotting Disorder Maternal Grandfather      Diabetes Paternal Grandmother      Kidney Disease Paternal Grandmother      Other - See Comments Paternal Grandfather         old age     Depression Brother      Diabetes Brother         type 2     Myocardial Infarction Brother 32        ETOHic, +tobacco     Peripheral Vascular Disease Brother      Hyperlipidemia Brother      Other - See Comments Sister         Post Partum DVT     Other Cancer Sister         retinal tear     Known Genetic Syndrome Daughter         marcos mitchell     Known Genetic Syndrome Daughter         marcos mitchell         Current Outpatient Medications   Medication Sig Dispense Refill     albuterol (PROAIR HFA/PROVENTIL HFA/VENTOLIN HFA) 108 (90 Base) MCG/ACT Inhaler Inhale 2 puffs into the lungs every 6 hours as needed for shortness of breath / dyspnea or wheezing 1 Inhaler 3     baclofen (LIORESAL) 10 MG tablet Take 10 mg by mouth 4 times daily  3     butalbital-acetaminophen-caffeine (FIORICET/ESGIC) -40 MG per tablet TAKE 1 TABLET BY MOUTH THREE TIMES WEEKLY AS NEEDED FOR HEADACHE 36 tablet 3     cholecalciferol (VITAMIN D3) 5000 UNITS CAPS capsule Take 1 capsule (5,000 Units) by mouth daily  0     diazepam (VALIUM) 5 MG tablet TK 1 T Q 8-12 H PRN FOR MSP  5      folic acid-vit B6-vit B12 (FOLGARD) 0.8-10-0.115 MG TABS Take 1 tablet by mouth daily       gabapentin (NEURONTIN) 400 MG capsule TAKE ONE CAPSULE BY MOUTH THREE TIMES DAILY 360 capsule 3     losartan (COZAAR) 100 MG tablet TAKE 1 TABLET BY MOUTH EVERY DAY 90 tablet 2     metoprolol succinate ER (TOPROL-XL) 100 MG 24 hr tablet TAKE 1/4 TABLET BY MOUTH ONE TIME A DAY 90 tablet 2     montelukast (SINGULAIR) 10 MG tablet TAKE 1 TABLET BY MOUTH EVERY EVENING 30 tablet 5     Multiple Vitamins-Minerals (MULTIVITAMIN GUMMIES ADULT) CHEW Take 2 tablets by mouth daily 30 tablet 0     Allergies   Allergen Reactions     Ace Inhibitors Cough     Amitriptyline Other (See Comments) and Nausea     Nightmares     Codeine      Headache and nausea      Flagyl [Metronidazole] Nausea     Morphine Nausea     Promethazine      nausea     Seasonal      Sulfa Drugs        ROS:  Constitutional, HEENT, cardiovascular, pulmonary, gi and gu systems are negative, except as otherwise noted.    OBJECTIVE:                                                    /78   Pulse 74   Temp 98.3  F (36.8  C) (Tympanic)   Wt 94.8 kg (209 lb)   SpO2 99%   BMI 34.78 kg/m    Body mass index is 34.78 kg/m .  GENERAL APPEARANCE: Alert, no acute distress  Scaly mole with warty features right flank at the pants line.    SKIN: no suspicious lesions or rashes to visualized skin  NEURO: Alert, oriented x 3, speech and mentation normal           ASSESSMENT/PLAN:                                                    1. Special screening for malignant neoplasms, colon  Done.   - Immunos occult blood; Future    2. Skin lesion  Warty vs. Keratotic.  Setting up excision here.      3. Essential hypertension, benign  Stable.  Down to 1/4 pill and then off pending her BP readings.      4. Benign essential hypertension  As above.    - metoprolol succinate ER (TOPROL-XL) 100 MG 24 hr tablet; TAKE 1/4 TABLET BY MOUTH ONE TIME A DAY  Dispense: 90 tablet; Refill:  2          Cholo Adame MD  [facility]

## 2019-02-14 ENCOUNTER — OFFICE VISIT (OUTPATIENT)
Dept: FAMILY MEDICINE | Facility: OTHER | Age: 54
End: 2019-02-14
Attending: FAMILY MEDICINE
Payer: COMMERCIAL

## 2019-02-14 ENCOUNTER — TELEPHONE (OUTPATIENT)
Dept: FAMILY MEDICINE | Facility: OTHER | Age: 54
End: 2019-02-14

## 2019-02-14 VITALS
WEIGHT: 209 LBS | TEMPERATURE: 98.3 F | SYSTOLIC BLOOD PRESSURE: 108 MMHG | HEART RATE: 74 BPM | BODY MASS INDEX: 34.78 KG/M2 | OXYGEN SATURATION: 99 % | DIASTOLIC BLOOD PRESSURE: 78 MMHG

## 2019-02-14 DIAGNOSIS — I10 ESSENTIAL HYPERTENSION, BENIGN: ICD-10-CM

## 2019-02-14 DIAGNOSIS — I10 BENIGN ESSENTIAL HYPERTENSION: ICD-10-CM

## 2019-02-14 DIAGNOSIS — Z12.11 SPECIAL SCREENING FOR MALIGNANT NEOPLASMS, COLON: ICD-10-CM

## 2019-02-14 DIAGNOSIS — L98.9 SKIN LESION: Primary | ICD-10-CM

## 2019-02-14 PROCEDURE — 99213 OFFICE O/P EST LOW 20 MIN: CPT | Performed by: FAMILY MEDICINE

## 2019-02-14 RX ORDER — BACLOFEN 10 MG/1
10 TABLET ORAL 4 TIMES DAILY
Refills: 3 | COMMUNITY
Start: 2018-12-22

## 2019-02-14 RX ORDER — METOPROLOL SUCCINATE 25 MG/1
25 TABLET, EXTENDED RELEASE ORAL DAILY
Qty: 30 TABLET | Refills: 0 | Status: SHIPPED | OUTPATIENT
Start: 2019-02-14 | End: 2019-02-21

## 2019-02-14 RX ORDER — METOPROLOL SUCCINATE 100 MG/1
TABLET, EXTENDED RELEASE ORAL
Qty: 90 TABLET | Refills: 2 | Status: SHIPPED | OUTPATIENT
Start: 2019-02-14 | End: 2019-02-14

## 2019-02-14 RX ORDER — DIAZEPAM 5 MG
TABLET ORAL
Refills: 5 | COMMUNITY
Start: 2018-12-22

## 2019-02-14 ASSESSMENT — PAIN SCALES - GENERAL: PAINLEVEL: NO PAIN (0)

## 2019-02-14 NOTE — NURSING NOTE
"Chief Complaint   Patient presents with     Flank Pain     Mole       Initial /78   Pulse 74   Temp 98.3  F (36.8  C) (Tympanic)   Wt 94.8 kg (209 lb)   SpO2 99%   BMI 34.78 kg/m   Estimated body mass index is 34.78 kg/m  as calculated from the following:    Height as of 10/31/18: 1.651 m (5' 5\").    Weight as of this encounter: 94.8 kg (209 lb).  Medication Reconciliation: complete    Stefani Gaffney MA    "

## 2019-02-14 NOTE — TELEPHONE ENCOUNTER
2:26 PM    Reason for Call: Phone Call    Description: Pharmacy called needing clarification on metoprolol succinate ER (TOPROL-XL) 100 MG 24 hr tablet medication. medication is ER and should not be cut. Provider to clarify how patient should be taking medication.     Was an appointment offered for this call? No  If yes : Appointment type              Date    Preferred method for responding to this message: Telephone Call  What is your phone number ?    If we cannot reach you directly, may we leave a detailed response at the number you provided? No    Can this message wait until your PCP/provider returns, if available today? Not applicable    Tess Hanesn MA

## 2019-02-14 NOTE — TELEPHONE ENCOUNTER
No script necessary.  We are weening and she is going to 1/4 them until f/u.  Thanks. Cholo Adame

## 2019-02-14 NOTE — TELEPHONE ENCOUNTER
,     Patient was seen today.  Metoprolol 100 mg was sent in with directions take 1/4 tablet daily.     The Metoprolol sent in today was ER tablets and per pharmacy cannot be cut.     Do you want patient taking 25 mg daily? Because we can send in 25 mg tablets instead. Or were directions put in incorrect and you want patient taking 100 mg? Please advise. Thank you

## 2019-02-15 NOTE — PROGRESS NOTES
SUBJECTIVE:                                                    Yvonne Llanos is a 53 year old female who presents to clinic today for the following health issues:      Mole removal      Duration: follow up    Description (location/character/radiation): right side above waist band    Intensity:  moderate    Accompanying signs and symptoms: has become raised in the last 2 months.    History (similar episodes/previous evaluation): None    Precipitating or alleviating factors: None    Therapies tried and outcome: None       PROBLEMS TO ADD ON...    Problem list and histories reviewed & adjusted, as indicated.  Additional history: tolerating the decreased bp pill no problem.  We discussed.  We are going off now.  Also colon screen negative.      Patient Active Problem List   Diagnosis     Hyperlipidemia LDL goal <130     Insulin resistance     Essential hypertension, benign     Rotator cuff tendonitis, left     MS (multiple sclerosis) (H)     Fibromyalgia     Tear film insufficiency     Status post bariatric surgery     Past Surgical History:   Procedure Laterality Date      x2       CHOLECYSTECTOMY       HYSTERECTOMY TOTAL ABDOMINAL, BILATERAL SALPINGO-OOPHORECTOMY, COMBINED  2001    Endometriosis     LAPAROSCOPIC GASTRIC SLEEVE  2014    [place]     LAPAROSCOPY      D&C, exploratory,  placental tissue adhered to uterus     rotator cuff tendon surgery Left      [facility]       Social History     Tobacco Use     Smoking status: Never Smoker     Smokeless tobacco: Never Used   Substance Use Topics     Alcohol use: Yes     Alcohol/week: 0.0 oz     Comment: rarely     Family History   Problem Relation Age of Onset     Hypertension Mother      Heart Failure Mother         Congestive     Other - See Comments Mother         marcos danlos??  hypermobility     Lipids Father         Hyperlipidemia     Cancer Father         Skin     Hypertension Father      Prostate Cancer Father      Suicide Maternal  Grandmother      Diabetes Maternal Grandfather      Clotting Disorder Maternal Grandfather      Diabetes Paternal Grandmother      Kidney Disease Paternal Grandmother      Other - See Comments Paternal Grandfather         old age     Depression Brother      Diabetes Brother         type 2     Myocardial Infarction Brother 32        ETOHic, +tobacco     Peripheral Vascular Disease Brother      Hyperlipidemia Brother      Other - See Comments Sister         Post Partum DVT     Other Cancer Sister         retinal tear     Known Genetic Syndrome Daughter         marcos mitchell     Known Genetic Syndrome Daughter         marcos mitchell         Current Outpatient Medications   Medication Sig Dispense Refill     albuterol (PROAIR HFA/PROVENTIL HFA/VENTOLIN HFA) 108 (90 Base) MCG/ACT Inhaler Inhale 2 puffs into the lungs every 6 hours as needed for shortness of breath / dyspnea or wheezing 1 Inhaler 3     baclofen (LIORESAL) 10 MG tablet Take 10 mg by mouth 4 times daily  3     butalbital-acetaminophen-caffeine (FIORICET/ESGIC) -40 MG per tablet TAKE 1 TABLET BY MOUTH THREE TIMES WEEKLY AS NEEDED FOR HEADACHE 36 tablet 3     cholecalciferol (VITAMIN D3) 5000 UNITS CAPS capsule Take 1 capsule (5,000 Units) by mouth daily  0     diazepam (VALIUM) 5 MG tablet TK 1 T Q 8-12 H PRN FOR MSP  5     folic acid-vit B6-vit B12 (FOLGARD) 0.8-10-0.115 MG TABS Take 1 tablet by mouth daily       gabapentin (NEURONTIN) 400 MG capsule TAKE ONE CAPSULE BY MOUTH THREE TIMES DAILY 360 capsule 3     losartan (COZAAR) 100 MG tablet TAKE 1 TABLET BY MOUTH EVERY DAY 90 tablet 2     montelukast (SINGULAIR) 10 MG tablet TAKE 1 TABLET BY MOUTH EVERY EVENING 30 tablet 5     Multiple Vitamins-Minerals (MULTIVITAMIN GUMMIES ADULT) CHEW Take 2 tablets by mouth daily 30 tablet 0     Allergies   Allergen Reactions     Ace Inhibitors Cough     Amitriptyline Other (See Comments) and Nausea     Nightmares     Codeine      Headache and nausea      Flagyl  [Metronidazole] Nausea     Morphine Nausea     Promethazine      nausea     Seasonal      Sulfa Drugs        ROS:  Constitutional, HEENT, cardiovascular, pulmonary, gi and gu systems are negative, except as otherwise noted.  In an MS flare right now with recent mild cold.  Has meds on hand.     OBJECTIVE:                                                    /82   Pulse 85   Temp 98.4  F (36.9  C) (Tympanic)   Wt 94.8 kg (209 lb)   SpO2 99%   BMI 34.78 kg/m    Body mass index is 34.78 kg/m .  GENERAL APPEARANCE: Alert, no acute distress  Right flank right at pants line mole with more prominent scaly changes.  It looks more like a wart to me today.   SKIN: no suspicious lesions or rashes to visualized skin  NEURO: Alert, oriented x 3, speech and mentation normal      Path pending.  Fit negative.       ASSESSMENT/PLAN:                                                    1. Special screening for malignant neoplasms, colon  Negative.    - Immunos occult blood    2. Atypical mole  Shaved.  Suspect wart given exam today.    - Surgical pathology exam  - SURGICAL TRAY - MINOR    3. Essential hypertension, benign  Stable.  Off now the toprol and nurse only bp check.            Cholo Adame MD  [facility]    Subjective: Yvonne BUSTOS Llanos a 53 year old female who presents today for lesion removal. The lesion(s) is/are located on the flank, number 1 and measures 1cm She The patient reports the lesion is asymptomatic and denies other significant symptoms on ROS. Medications reviewed.    Pause for the cause has been completed prior to the prceedure.   1. Yvonne was identified by both name and date of birth   2. The correct site was identified   3. Site was marked by provider    4. Written informed consent correct and signed or verbal authorization  to proceed was obtained   5. Verifed necessary supplies, equipment, and diagnostics are available    6. Time out was performed immediately prior to  procedure    Objective: The lesion(s) is/are of the above mentioned size and location and is/are typical. The area was prepped and appropriately anesthetized. Using the usual technique, shave excision was performed.  Silver nitrate for weeping mild bleeding.   An appropriate dressing was applied. The procedure was well tolerated and without complications.     Assessment: atypical nevus    Plan: Follow up: The specimen is labelled and sent to pathology for evaluation.. Wound care instructions provided.  Patient was instructed to be alert for any signs of cutaneous infection.   s

## 2019-02-21 ENCOUNTER — OFFICE VISIT (OUTPATIENT)
Dept: FAMILY MEDICINE | Facility: OTHER | Age: 54
End: 2019-02-21
Attending: FAMILY MEDICINE
Payer: COMMERCIAL

## 2019-02-21 VITALS
DIASTOLIC BLOOD PRESSURE: 82 MMHG | WEIGHT: 209 LBS | HEART RATE: 85 BPM | TEMPERATURE: 98.4 F | BODY MASS INDEX: 34.78 KG/M2 | SYSTOLIC BLOOD PRESSURE: 120 MMHG | OXYGEN SATURATION: 99 %

## 2019-02-21 DIAGNOSIS — D22.9 ATYPICAL MOLE: Primary | ICD-10-CM

## 2019-02-21 DIAGNOSIS — Z12.11 SPECIAL SCREENING FOR MALIGNANT NEOPLASMS, COLON: ICD-10-CM

## 2019-02-21 DIAGNOSIS — I10 ESSENTIAL HYPERTENSION, BENIGN: ICD-10-CM

## 2019-02-21 LAB — HEMOCCULT SP1 STL QL: NEGATIVE

## 2019-02-21 PROCEDURE — 11301 SHAVE SKIN LESION 0.6-1.0 CM: CPT | Performed by: FAMILY MEDICINE

## 2019-02-21 PROCEDURE — 88305 TISSUE EXAM BY PATHOLOGIST: CPT | Mod: TC | Performed by: FAMILY MEDICINE

## 2019-02-21 PROCEDURE — 99212 OFFICE O/P EST SF 10 MIN: CPT | Mod: 25 | Performed by: FAMILY MEDICINE

## 2019-02-21 PROCEDURE — 82274 ASSAY TEST FOR BLOOD FECAL: CPT | Performed by: FAMILY MEDICINE

## 2019-02-21 ASSESSMENT — PAIN SCALES - GENERAL: PAINLEVEL: MILD PAIN (3)

## 2019-02-21 NOTE — NURSING NOTE
"Chief Complaint   Patient presents with     Lesion Removal       Initial /82   Pulse 85   Temp 98.4  F (36.9  C) (Tympanic)   Wt 94.8 kg (209 lb)   SpO2 99%   BMI 34.78 kg/m   Estimated body mass index is 34.78 kg/m  as calculated from the following:    Height as of 10/31/18: 1.651 m (5' 5\").    Weight as of this encounter: 94.8 kg (209 lb).  Medication Reconciliation: complete    Steafni Gaffney MA    "

## 2019-02-26 LAB — COPATH REPORT: NORMAL

## 2019-02-28 ENCOUNTER — OFFICE VISIT (OUTPATIENT)
Dept: FAMILY MEDICINE | Facility: OTHER | Age: 54
End: 2019-02-28
Attending: FAMILY MEDICINE
Payer: COMMERCIAL

## 2019-02-28 VITALS
HEART RATE: 74 BPM | SYSTOLIC BLOOD PRESSURE: 174 MMHG | WEIGHT: 209 LBS | OXYGEN SATURATION: 99 % | BODY MASS INDEX: 34.78 KG/M2 | DIASTOLIC BLOOD PRESSURE: 108 MMHG | TEMPERATURE: 98.4 F

## 2019-02-28 VITALS — SYSTOLIC BLOOD PRESSURE: 174 MMHG | DIASTOLIC BLOOD PRESSURE: 108 MMHG

## 2019-02-28 DIAGNOSIS — I10 ESSENTIAL HYPERTENSION, BENIGN: Primary | ICD-10-CM

## 2019-02-28 PROCEDURE — 99207 ZZC NO CHARGE NURSE ONLY: CPT

## 2019-02-28 PROCEDURE — 99213 OFFICE O/P EST LOW 20 MIN: CPT | Performed by: FAMILY MEDICINE

## 2019-02-28 RX ORDER — METOPROLOL SUCCINATE 25 MG/1
25 TABLET, EXTENDED RELEASE ORAL DAILY
Qty: 90 TABLET | Refills: 3 | Status: SHIPPED | OUTPATIENT
Start: 2019-02-28 | End: 2019-05-30 | Stop reason: ALTCHOICE

## 2019-02-28 ASSESSMENT — PAIN SCALES - GENERAL: PAINLEVEL: MILD PAIN (2)

## 2019-02-28 NOTE — PROGRESS NOTES
SUBJECTIVE:                                                    Yvonne Llanos is a 53 year old female who presents to clinic today for the following health issues:      Hypertension Follow-up      Outpatient blood pressures are not being checked.    Low Salt Diet: 1 gram sodium      Amount of exercise or physical activity: 4-5 days/week for an average of 15-30 minutes    Problems taking medications regularly: No    Medication side effects: none    Diet: regular (no restrictions)        PROBLEMS TO ADD ON...    Problem list and histories reviewed & adjusted, as indicated.  Additional history: weened the toprol.  Now bp up again .  We talked options.  See below.     Patient Active Problem List   Diagnosis     Hyperlipidemia LDL goal <130     Insulin resistance     Essential hypertension, benign     Rotator cuff tendonitis, left     MS (multiple sclerosis) (H)     Fibromyalgia     Tear film insufficiency     Status post bariatric surgery     Past Surgical History:   Procedure Laterality Date      x2       CHOLECYSTECTOMY       HYSTERECTOMY TOTAL ABDOMINAL, BILATERAL SALPINGO-OOPHORECTOMY, COMBINED  2001    Endometriosis     LAPAROSCOPIC GASTRIC SLEEVE  2014    [place]     LAPAROSCOPY      D&C, exploratory,  placental tissue adhered to uterus     rotator cuff tendon surgery Left      [facility]       Social History     Tobacco Use     Smoking status: Never Smoker     Smokeless tobacco: Never Used   Substance Use Topics     Alcohol use: Yes     Alcohol/week: 0.0 oz     Comment: rarely     Family History   Problem Relation Age of Onset     Hypertension Mother      Heart Failure Mother         Congestive     Other - See Comments Mother         marcos danlos??  hypermobility     Lipids Father         Hyperlipidemia     Cancer Father         Skin     Hypertension Father      Prostate Cancer Father      Suicide Maternal Grandmother      Diabetes Maternal Grandfather      Clotting Disorder Maternal  Grandfather      Diabetes Paternal Grandmother      Kidney Disease Paternal Grandmother      Other - See Comments Paternal Grandfather         old age     Depression Brother      Diabetes Brother         type 2     Myocardial Infarction Brother 32        ETOHic, +tobacco     Peripheral Vascular Disease Brother      Hyperlipidemia Brother      Other - See Comments Sister         Post Partum DVT     Other Cancer Sister         retinal tear     Known Genetic Syndrome Daughter         marcos mitchell     Known Genetic Syndrome Daughter         marcos mitchell         Current Outpatient Medications   Medication Sig Dispense Refill     albuterol (PROAIR HFA/PROVENTIL HFA/VENTOLIN HFA) 108 (90 Base) MCG/ACT Inhaler Inhale 2 puffs into the lungs every 6 hours as needed for shortness of breath / dyspnea or wheezing 1 Inhaler 3     baclofen (LIORESAL) 10 MG tablet Take 10 mg by mouth 4 times daily  3     butalbital-acetaminophen-caffeine (FIORICET/ESGIC) -40 MG per tablet TAKE 1 TABLET BY MOUTH THREE TIMES WEEKLY AS NEEDED FOR HEADACHE 36 tablet 3     cholecalciferol (VITAMIN D3) 5000 UNITS CAPS capsule Take 1 capsule (5,000 Units) by mouth daily  0     diazepam (VALIUM) 5 MG tablet TK 1 T Q 8-12 H PRN FOR MSP  5     folic acid-vit B6-vit B12 (FOLGARD) 0.8-10-0.115 MG TABS Take 1 tablet by mouth daily       gabapentin (NEURONTIN) 400 MG capsule TAKE ONE CAPSULE BY MOUTH THREE TIMES DAILY 360 capsule 3     losartan (COZAAR) 100 MG tablet TAKE 1 TABLET BY MOUTH EVERY DAY 90 tablet 2     metoprolol succinate ER (TOPROL-XL) 25 MG 24 hr tablet Take 1 tablet (25 mg) by mouth daily 90 tablet 3     montelukast (SINGULAIR) 10 MG tablet TAKE 1 TABLET BY MOUTH EVERY EVENING 30 tablet 5     Multiple Vitamins-Minerals (MULTIVITAMIN GUMMIES ADULT) CHEW Take 2 tablets by mouth daily 30 tablet 0     Allergies   Allergen Reactions     Ace Inhibitors Cough     Amitriptyline Other (See Comments) and Nausea     Nightmares     Codeine       Headache and nausea      Flagyl [Metronidazole] Nausea     Morphine Nausea     Promethazine      nausea     Seasonal      Sulfa Drugs        ROS:  Constitutional, HEENT, cardiovascular, pulmonary, gi and gu systems are negative, except as otherwise noted.    OBJECTIVE:                                                    BP (!) 174/108   Pulse 74   Temp 98.4  F (36.9  C) (Tympanic)   Wt 94.8 kg (209 lb)   SpO2 99%   BMI 34.78 kg/m    Body mass index is 34.78 kg/m .  GENERAL APPEARANCE: Alert, no acute distress  CV: regular rate and rhythm, no murmur, rub or gallop  RESP: lungs clear to auscultation bilaterally  ABDOMEN: normal bowel sounds, soft, nontender, no hepatosplenomegaly or other masses  SKIN: no suspicious lesions or rashes to visualized skin  NEURO: Alert, oriented x 3, speech and mentation normal           ASSESSMENT/PLAN:                                                    1. Essential hypertension, benign  Discussed options.  It was well controlled on just 25 of the toprol.  She failed amlodipine (edema), Tenoretic (low K), lisinopril (cough) is on the losartan, and failed clonidine with poor control.  We went back to the toprol.  2 week bp check.    - metoprolol succinate ER (TOPROL-XL) 25 MG 24 hr tablet; Take 1 tablet (25 mg) by mouth daily  Dispense: 90 tablet; Refill: 3          Cholo Adame MD  [facility]

## 2019-02-28 NOTE — NURSING NOTE
"Chief Complaint   Patient presents with     Hypertension       Initial BP (!) 174/108   Pulse 74   Temp 98.4  F (36.9  C) (Tympanic)   Wt 94.8 kg (209 lb)   SpO2 99%   BMI 34.78 kg/m   Estimated body mass index is 34.78 kg/m  as calculated from the following:    Height as of 10/31/18: 1.651 m (5' 5\").    Weight as of this encounter: 94.8 kg (209 lb).  Medication Reconciliation: complete    Stefani Gaffney MA    "

## 2019-03-14 ENCOUNTER — ALLIED HEALTH/NURSE VISIT (OUTPATIENT)
Dept: FAMILY MEDICINE | Facility: OTHER | Age: 54
End: 2019-03-14
Attending: FAMILY MEDICINE
Payer: COMMERCIAL

## 2019-03-14 VITALS — DIASTOLIC BLOOD PRESSURE: 92 MMHG | SYSTOLIC BLOOD PRESSURE: 148 MMHG

## 2019-03-14 DIAGNOSIS — I10 ESSENTIAL HYPERTENSION, BENIGN: Primary | ICD-10-CM

## 2019-03-14 PROCEDURE — 99207 ZZC NO CHARGE NURSE ONLY: CPT

## 2019-03-21 DIAGNOSIS — I10 ESSENTIAL HYPERTENSION, BENIGN: ICD-10-CM

## 2019-03-22 RX ORDER — LOSARTAN POTASSIUM 100 MG/1
TABLET ORAL
Qty: 90 TABLET | Refills: 0 | Status: SHIPPED | OUTPATIENT
Start: 2019-03-22 | End: 2019-06-21

## 2019-03-22 NOTE — TELEPHONE ENCOUNTER
losartan (COZAAR) 100 MG tablet    Last Written Prescription Date:  07/05/2018  Last Fill Quantity: 90,   # refills: 2  Last Office Visit: 02/28/2019  Future Office visit:       Routing refill request to provider for review/approval because:

## 2019-03-28 ENCOUNTER — TELEPHONE (OUTPATIENT)
Dept: FAMILY MEDICINE | Facility: OTHER | Age: 54
End: 2019-03-28

## 2019-03-28 ENCOUNTER — OFFICE VISIT (OUTPATIENT)
Dept: FAMILY MEDICINE | Facility: OTHER | Age: 54
End: 2019-03-28
Attending: FAMILY MEDICINE
Payer: COMMERCIAL

## 2019-03-28 VITALS
DIASTOLIC BLOOD PRESSURE: 114 MMHG | WEIGHT: 204 LBS | HEART RATE: 57 BPM | OXYGEN SATURATION: 98 % | SYSTOLIC BLOOD PRESSURE: 202 MMHG | BODY MASS INDEX: 33.95 KG/M2

## 2019-03-28 DIAGNOSIS — I10 ESSENTIAL HYPERTENSION, BENIGN: Primary | ICD-10-CM

## 2019-03-28 PROCEDURE — 99213 OFFICE O/P EST LOW 20 MIN: CPT | Performed by: FAMILY MEDICINE

## 2019-03-28 RX ORDER — METOPROLOL SUCCINATE 100 MG/1
100 TABLET, EXTENDED RELEASE ORAL DAILY
Qty: 90 TABLET | Refills: 3 | Status: SHIPPED | OUTPATIENT
Start: 2019-03-28 | End: 2019-05-30 | Stop reason: ALTCHOICE

## 2019-03-28 ASSESSMENT — PAIN SCALES - GENERAL: PAINLEVEL: MILD PAIN (3)

## 2019-03-28 NOTE — PROGRESS NOTES
SUBJECTIVE:                                                    Yvonne Llanos is a 53 year old female who presents to clinic today for the following health issues:    Hypertension Follow-up      Outpatient blood pressures are being checked at home.  Results are elevated.  BP was 208/118 at [facility] today.  Pt has had a headache x 3 days    Low Salt Diet: low salt      Amount of exercise or physical activity: walking    Problems taking medications regularly: No    Medication side effects: none    Diet: regular (no restrictions)        PROBLEMS TO ADD ON...    Problem list and histories reviewed & adjusted, as indicated.  Additional history: in an MS flare.  No odd sx otherwise.     Patient Active Problem List   Diagnosis     Hyperlipidemia LDL goal <130     Insulin resistance     Essential hypertension, benign     Rotator cuff tendonitis, left     MS (multiple sclerosis) (H)     Fibromyalgia     Tear film insufficiency     Status post bariatric surgery     Past Surgical History:   Procedure Laterality Date      x2       CHOLECYSTECTOMY       HYSTERECTOMY TOTAL ABDOMINAL, BILATERAL SALPINGO-OOPHORECTOMY, COMBINED  2001    Endometriosis     LAPAROSCOPIC GASTRIC SLEEVE  2014    [place]     LAPAROSCOPY      D&C, exploratory,  placental tissue adhered to uterus     rotator cuff tendon surgery Left      [facility]       Social History     Tobacco Use     Smoking status: Never Smoker     Smokeless tobacco: Never Used   Substance Use Topics     Alcohol use: Yes     Alcohol/week: 0.0 oz     Comment: rarely     Family History   Problem Relation Age of Onset     Hypertension Mother      Heart Failure Mother         Congestive     Other - See Comments Mother         marcos danlos??  hypermobility     Lipids Father         Hyperlipidemia     Cancer Father         Skin     Hypertension Father      Prostate Cancer Father      Suicide Maternal Grandmother      Diabetes Maternal Grandfather      Clotting  Disorder Maternal Grandfather      Diabetes Paternal Grandmother      Kidney Disease Paternal Grandmother      Other - See Comments Paternal Grandfather         old age     Depression Brother      Diabetes Brother         type 2     Myocardial Infarction Brother 32        ETOHic, +tobacco     Peripheral Vascular Disease Brother      Hyperlipidemia Brother      Other - See Comments Sister         Post Partum DVT     Other Cancer Sister         retinal tear     Known Genetic Syndrome Daughter         marcos rheanas     Known Genetic Syndrome Daughter         marcoselza mitchell         Current Outpatient Medications   Medication Sig Dispense Refill     baclofen (LIORESAL) 10 MG tablet Take 10 mg by mouth 4 times daily  3     butalbital-acetaminophen-caffeine (FIORICET/ESGIC) -40 MG per tablet TAKE 1 TABLET BY MOUTH THREE TIMES WEEKLY AS NEEDED FOR HEADACHE 36 tablet 3     cholecalciferol (VITAMIN D3) 5000 UNITS CAPS capsule Take 1 capsule (5,000 Units) by mouth daily  0     diazepam (VALIUM) 5 MG tablet TK 1 T Q 8-12 H PRN FOR MSP  5     folic acid-vit B6-vit B12 (FOLGARD) 0.8-10-0.115 MG TABS Take 1 tablet by mouth daily       gabapentin (NEURONTIN) 400 MG capsule TAKE ONE CAPSULE BY MOUTH THREE TIMES DAILY 360 capsule 3     ketotifen (ZADITOR/REFRESH ANTI-ITCH) 0.025 % ophthalmic solution 1 drop 2 times daily       losartan (COZAAR) 100 MG tablet TAKE 1 TABLET BY MOUTH EVERY DAY 90 tablet 0     metoprolol succinate ER (TOPROL-XL) 100 MG 24 hr tablet Take 1 tablet (100 mg) by mouth daily 90 tablet 3     metoprolol succinate ER (TOPROL-XL) 25 MG 24 hr tablet Take 1 tablet (25 mg) by mouth daily (Patient taking differently: Take 50 mg by mouth daily ) 90 tablet 3     Multiple Vitamins-Minerals (MULTIVITAMIN GUMMIES ADULT) CHEW Take 2 tablets by mouth daily 30 tablet 0     albuterol (PROAIR HFA/PROVENTIL HFA/VENTOLIN HFA) 108 (90 Base) MCG/ACT Inhaler Inhale 2 puffs into the lungs every 6 hours as needed for shortness of  breath / dyspnea or wheezing (Patient not taking: Reported on 3/28/2019) 1 Inhaler 3     montelukast (SINGULAIR) 10 MG tablet TAKE 1 TABLET BY MOUTH EVERY EVENING (Patient not taking: Reported on 3/28/2019) 30 tablet 5     Allergies   Allergen Reactions     Ace Inhibitors Cough     Amitriptyline Other (See Comments) and Nausea     Nightmares     Codeine      Headache and nausea      Flagyl [Metronidazole] Nausea     Morphine Nausea     Promethazine      nausea     Seasonal      Sulfa Drugs        ROS:  Constitutional, HEENT, cardiovascular, pulmonary, gi and gu systems are negative, except as otherwise noted.    OBJECTIVE:                                                    BP (!) 202/114   Pulse 57   Wt 92.5 kg (204 lb)   SpO2 98%   BMI 33.95 kg/m    Body mass index is 33.95 kg/m .  GENERAL APPEARANCE: Alert, no acute distress  CV: regular rate and rhythm, no murmur, rub or gallop  RESP: lungs clear to auscultation bilaterally  ABDOMEN: normal bowel sounds, soft, nontender, no hepatosplenomegaly or other masses  SKIN: no suspicious lesions or rashes to visualized skin  NEURO: Alert, oriented x 3, speech and mentation normal           ASSESSMENT/PLAN:                                                    1. Essential hypertension, benign  Back up to 100 on the toprol.  We may need to titrate 50 to 100 based on sx.  She was fine on the 50, but gets huge flares like this and bp way up.  Asking cardiology to see and recommend as she is very med intolerant.  F/u routine otherwise.    - metoprolol succinate ER (TOPROL-XL) 100 MG 24 hr tablet; Take 1 tablet (100 mg) by mouth daily  Dispense: 90 tablet; Refill: 3  - CARDIOLOGY EVAL ADULT REFERRAL          Cholo Adame MD  [facility]

## 2019-03-28 NOTE — TELEPHONE ENCOUNTER
Pt had her BP taken this morning at [facility] and the left arm was 208/118 and right arm was 185/116.  She is taking metoprolol 50 mg daily for the last 2 weeks and losartan.  She states she has had a headache the last 3 days.  BP now is 202/114, appt made with Dr Adame today.

## 2019-04-29 ENCOUNTER — TELEPHONE (OUTPATIENT)
Dept: CARDIOLOGY | Facility: OTHER | Age: 54
End: 2019-04-29
Payer: COMMERCIAL

## 2019-04-29 ENCOUNTER — OFFICE VISIT (OUTPATIENT)
Dept: CARDIOLOGY | Facility: OTHER | Age: 54
End: 2019-04-29
Attending: INTERNAL MEDICINE
Payer: COMMERCIAL

## 2019-04-29 VITALS
DIASTOLIC BLOOD PRESSURE: 90 MMHG | WEIGHT: 211 LBS | BODY MASS INDEX: 33.91 KG/M2 | OXYGEN SATURATION: 99 % | HEART RATE: 68 BPM | TEMPERATURE: 98 F | HEIGHT: 66 IN | SYSTOLIC BLOOD PRESSURE: 144 MMHG

## 2019-04-29 DIAGNOSIS — E78.2 MIXED HYPERLIPIDEMIA: ICD-10-CM

## 2019-04-29 DIAGNOSIS — I10 ESSENTIAL HYPERTENSION, BENIGN: Primary | ICD-10-CM

## 2019-04-29 DIAGNOSIS — Z86.79 HX OF CARDIAC MURMUR: ICD-10-CM

## 2019-04-29 DIAGNOSIS — E03.9 HYPOTHYROIDISM, UNSPECIFIED TYPE: ICD-10-CM

## 2019-04-29 DIAGNOSIS — R73.9 HYPERGLYCEMIA: ICD-10-CM

## 2019-04-29 DIAGNOSIS — G47.33 OSA (OBSTRUCTIVE SLEEP APNEA): ICD-10-CM

## 2019-04-29 DIAGNOSIS — G35 MS (MULTIPLE SCLEROSIS) (H): ICD-10-CM

## 2019-04-29 DIAGNOSIS — I16.0 HYPERTENSIVE URGENCY: ICD-10-CM

## 2019-04-29 PROCEDURE — 99205 OFFICE O/P NEW HI 60 MIN: CPT | Performed by: INTERNAL MEDICINE

## 2019-04-29 PROCEDURE — 93000 ELECTROCARDIOGRAM COMPLETE: CPT | Performed by: INTERNAL MEDICINE

## 2019-04-29 RX ORDER — HYDROCHLOROTHIAZIDE 12.5 MG/1
12.5 CAPSULE ORAL DAILY
Qty: 90 CAPSULE | Refills: 3 | Status: SHIPPED | OUTPATIENT
Start: 2019-04-29 | End: 2019-05-30 | Stop reason: DRUGHIGH

## 2019-04-29 RX ORDER — SPIRONOLACTONE 25 MG/1
12.5 TABLET ORAL DAILY
Qty: 45 TABLET | Refills: 3 | Status: SHIPPED | OUTPATIENT
Start: 2019-04-29 | End: 2019-05-30

## 2019-04-29 ASSESSMENT — PAIN SCALES - GENERAL: PAINLEVEL: MODERATE PAIN (4)

## 2019-04-29 ASSESSMENT — MIFFLIN-ST. JEOR: SCORE: 1578.84

## 2019-04-29 NOTE — PATIENT INSTRUCTIONS
You were seen by Dr. Hunter, 4/29/2019.     1.  You will have an echocardiogram performed.  This is an ultrasound of the heart, that evaluates heart function.  The hospital scheduling department will call to schedule you for this test.        2.  You will have an ultrasound of your kidneys (specifically to look at the arteries in your kidneys).  You will be called to schedule the ultrasoundi.    3.  Start taking hydrochlorothiazide 12.5 mg once daily.  A new prescription will be sent to your pharmacy.    4.  Start taking spironolactone 12.5 mg once daily.  A new prescription will be sent to your pharmacy.    5.  A referral will be placed to Sleep Medicine to be checked for sleep apnea.  Please complete the questionnaire and mail it back.      6.  You will have a variety of labs done in 1 -2 weeks.  The orders will be placed.  Please stop by the clinic lab at your convenience in 1 - 2 weeks.  We will call you with the results.    You will follow up with Dr. Hunter in 1 month.       Please call the cardiology office with problems, questions, or concerns at [phone].    If you experience chest pain, chest pressure, chest tightness, shortness of breath, fainting, lightheadedness, nausea, vomiting, or other concerning symptoms, please report to the Emergency Department or call 911. These symptoms may be emergent, and best treated in the Emergency Department.     Michelle LERMA RN  Cardiology   [facility]  [phone]

## 2019-04-29 NOTE — PROGRESS NOTES
[facility] HEART CARE   CARDIOLOGY CONSULT     Yvonne Llanos   1965  [id number]    Cholo Adame     Chief Complaint   Patient presents with     Hypertension     NPT Referral Dr MARIA GUADALUPE Adame          HPI:   Mrs. Llanos is a 53-year-old female who is being seen by cardiology secondary to uncontrolled hypertension.  She also has a history of hypertensive urgency with a headache and a blood pressure in the 200's, hyperlipidemia, KARRIE, MS, hypothyroidism, reported history of heart murmur, and fibromyalgia.    She struggles with control of her blood pressure.  She is currently on metoprolol 100 mg XL daily and losartan 100 mg daily.  She has been on amlodipine in the past but this has resulted in swelling to her lower extremities which has caused her plantar fasciitis to flare.  She is on hydrochlorothiazide which controlled her blood pressure well but it has caused her potassium to drop.  Her blood pressure today via machine is 154/92 and manual is 144/90.  She has an automated machine which she does not feel comfortable using and has not purchased a manual blood pressure cuff at this point.  Her blood pressure in the chart has ranged from the 120's to 200's.  Her EKG today is concerning for LVH.  She is known to have obstructive sleep apnea but this has not been treated.  We also discussed other secondary causes of hypertension which she is willing to have evaluated as she feels she has good insurance.  She has a family history of heart disease with a brother dying in his 30's from heart disease and a father who had heart disease in his 70's.  She herself has not had any chest pain or chest tightness.  She denies any palpitations or racing heart.  She has not had stenting or bypass.  She does not presently have lower extremity edema.  Her only other complaint today is a recent finding of hypothyroidism through her neurology appointment in [place].    IMAGING RESULTS:   None on file.    ALLERGIES:   Allergies    Allergen Reactions     Ace Inhibitors Cough     Amitriptyline Other (See Comments) and Nausea     Nightmares     Codeine      Headache and nausea      Flagyl [Metronidazole] Nausea     Morphine Nausea     Promethazine      nausea     Seasonal      Sulfa Drugs         PAST MEDICAL HISTORY:   Past Medical History:   Diagnosis Date     Asthma     mild[place]      Dyslipidemia 2011     Endometriosis      Fibromyalgia      Hypertension 2011     Migraines      Other abnormal glucose 2011     Pancreatitis due to biliary obstruction     improved after cholecystectomy     Seasonal Allergies 2011     Sleep apnea     on CPAP pressure of 8, resolved s/p  gastric sleeve        PAST SURGICAL HISTORY:   Past Surgical History:   Procedure Laterality Date      x2       CHOLECYSTECTOMY       HYSTERECTOMY TOTAL ABDOMINAL, BILATERAL SALPINGO-OOPHORECTOMY, COMBINED  2001    Endometriosis     LAPAROSCOPIC GASTRIC SLEEVE  2014    [place]     LAPAROSCOPY      D&C, exploratory,  placental tissue adhered to uterus     rotator cuff tendon surgery Left      [place]        FAMILY HISTORY:   Family History   Problem Relation Age of Onset     Hypertension Mother      Heart Failure Mother         Congestive     Other - See Comments Mother         marcos danlos??  hypermobility     Lipids Father         Hyperlipidemia     Cancer Father         Skin     Hypertension Father      Prostate Cancer Father      Suicide Maternal Grandmother      Diabetes Maternal Grandfather      Clotting Disorder Maternal Grandfather      Diabetes Paternal Grandmother      Kidney Disease Paternal Grandmother      Other - See Comments Paternal Grandfather         old age     Depression Brother      Diabetes Brother         type 2     Myocardial Infarction Brother 32        ETOHic, +tobacco     Peripheral Vascular Disease Brother      Hyperlipidemia Brother      Other -  See Comments Sister         Post Partum DVT     Other Cancer Sister         retinal tear     Known Genetic Syndrome Daughter         marcos mitchell     Known Genetic Syndrome Daughter         marcos mitchell        SOCIAL HISTORY:   Social History     Socioeconomic History     Marital status:      Spouse name: Scooter     Number of children: 3     Years of education: 16     Highest education level: Not on file   Occupational History     Occupation:      Employer: [employer]     Comment: FULL-TIME   Social Needs     Financial resource strain: Not on file     Food insecurity:     Worry: Not on file     Inability: Not on file     Transportation needs:     Medical: Not on file     Non-medical: Not on file   Tobacco Use     Smoking status: Never Smoker     Smokeless tobacco: Never Used   Substance and Sexual Activity     Alcohol use: Yes     Alcohol/week: 0.0 oz     Comment: rarely     Drug use: No     Sexual activity: Yes     Partners: Male   Lifestyle     Physical activity:     Days per week: Not on file     Minutes per session: Not on file     Stress: Not on file   Relationships     Social connections:     Talks on phone: Not on file     Gets together: Not on file     Attends Hindu service: Not on file     Active member of club or organization: Not on file     Attends meetings of clubs or organizations: Not on file     Relationship status: Not on file     Intimate partner violence:     Fear of current or ex partner: Not on file     Emotionally abused: Not on file     Physically abused: Not on file     Forced sexual activity: Not on file   Other Topics Concern      Service No     Blood Transfusions Yes     Comment: PERMITS     Caffeine Concern Yes     Comment: 1-2 coffee/day     Occupational Exposure Not Asked     Hobby Hazards Not Asked     Sleep Concern Not Asked     Stress Concern Not Asked     Weight Concern Not Asked     Special Diet Not Asked     Back Care Not Asked      Exercise Yes     Comment: walking 20 min daily     Bike Helmet Not Asked     Seat Belt Yes     Self-Exams Yes     Parent/sibling w/ CABG, MI or angioplasty before 65F 55M? Yes   Social History Narrative     -- none    Exercise -- walking 30 min daily    Caffeine -- 1 coffee/daily         CURRENT MEDICATIONS:   Prior to Admission medications    Medication Sig Start Date End Date Taking? Authorizing Provider   albuterol (PROAIR HFA/PROVENTIL HFA/VENTOLIN HFA) 108 (90 Base) MCG/ACT Inhaler Inhale 2 puffs into the lungs every 6 hours as needed for shortness of breath / dyspnea or wheezing  Patient not taking: Reported on 3/28/2019 4/16/18   Cholo Adame MD   baclofen (LIORESAL) 10 MG tablet Take 10 mg by mouth 4 times daily 12/22/18   Reported, Patient   butalbital-acetaminophen-caffeine (FIORICET/ESGIC) -40 MG per tablet TAKE 1 TABLET BY MOUTH THREE TIMES WEEKLY AS NEEDED FOR HEADACHE 7/31/18   Cholo Adame MD   cholecalciferol (VITAMIN D3) 5000 UNITS CAPS capsule Take 1 capsule (5,000 Units) by mouth daily 10/26/16   Yudy Hsu MD   diazepam (VALIUM) 5 MG tablet TK 1 T Q 8-12 H PRN FOR MSP 12/22/18   Reported, Patient   folic acid-vit B6-vit B12 (FOLGARD) 0.8-10-0.115 MG TABS Take 1 tablet by mouth daily    Reported, Patient   gabapentin (NEURONTIN) 400 MG capsule TAKE ONE CAPSULE BY MOUTH THREE TIMES DAILY 5/24/18   Cholo Adame MD   ketotifen (ZADITOR/REFRESH ANTI-ITCH) 0.025 % ophthalmic solution 1 drop 2 times daily    Reported, Patient   losartan (COZAAR) 100 MG tablet TAKE 1 TABLET BY MOUTH EVERY DAY 3/22/19   Yudy Hsu MD   metoprolol succinate ER (TOPROL-XL) 100 MG 24 hr tablet Take 1 tablet (100 mg) by mouth daily 3/28/19   Cholo Adame MD   metoprolol succinate ER (TOPROL-XL) 25 MG 24 hr tablet Take 1 tablet (25 mg) by mouth daily  Patient taking differently: Take 50 mg by mouth daily  2/28/19   Cholo Adame MD   montelukast (SINGULAIR) 10 MG  tablet TAKE 1 TABLET BY MOUTH EVERY EVENING  Patient not taking: Reported on 3/28/2019 7/26/18   Cholo Adame MD   Multiple Vitamins-Minerals (MULTIVITAMIN GUMMIES ADULT) CHEW Take 2 tablets by mouth daily 7/3/14   Yudy Hsu MD          ROS:   CONSTITUTIONAL: No weight loss, fever, chills, weakness or fatigue.   HEENT: Eyes: No visual changes. Ears, Nose, Throat: No hearing loss, congestion or difficulty swallowing.   CARDIOVASCULAR: No chest pain, chest pressure or chest discomfort. No palpitations but with minimal lower extremity edema.   RESPIRATORY: No shortness of breath, dyspnea upon exertion, cough or sputum production.   GASTROINTESTINAL: No abdominal pain. No anorexia, nausea, vomiting or diarrhea.   NEUROLOGICAL: No headache, lightheadedness, dizziness, syncope, ataxia or weakness.   HEMATOLOGIC: No anemia, bleeding or bruising.   PSYCHIATRIC: No history of depression or anxiety.   ENDOCRINOLOGIC: No reports of sweating, cold or heat intolerance. No polyuria or polydipsia.   SKIN: No abnormal rashes or itching.       PHYSICAL EXAM:   GENERAL: The patient is a well-developed, well-nourished, in no apparent distress. Alert and oriented x3.   HEENT: Head is normocephalic and atraumatic. Eyes are symmetrical with normal visual tracking.  HEART: Regular rate and rhythm, S1S2 present without murmur, rub or gallop.   LUNGS: Respirations regular and unlabored. Clear to auscultation.   GI: Abdomen is soft and non distended.   EXTREMITIES: No peripheral edema present.   MUSCULOSKELETAL: No joint swelling.   NEUROLOGIC: Alert and oriented X3.    SKIN: No jaundice. No rashes or visible skin lesions present.       LAB RESULTS:   No visits with results within 2 Month(s) from this visit.   Latest known visit with results is:   Office Visit on 02/21/2019   Component Date Value Ref Range Status     Occult Blood Slide 1 02/21/2019 Negative  NEG^Negative Final     Copath Report 02/21/2019    Final               "      Value:Patient Name: SARAH GARCIA  MR#: [id number]  Specimen #:   Collected: 2/21/2019  Received: 2/22/2019  Reported: 2/26/2019 10:54  Ordering Phy(s): BIRGIT NARANJO    For improved result formatting, select 'View Enhanced Report Format' under   Linked Documents section.    SPECIMEN(S):  Skin, right flank    FINAL DIAGNOSIS:  Skin, right flank, lesion, shave biopsy  - Seborrheic keratosis    I have personally reviewed all specimens and/or slides, including the   listed special stains, and used them  with my medical judgement to determine or confirm the final diagnosis.    Electronically signed out by:    Yaima Sanchez M.D.    CLINICAL HISTORY:  Atypical mole.    GROSS:  The specimen is received in formalin with proper patient identification   labeled \"right flank skin lesion\".  The specimen consists of gray-tan rubbery dome-shaped rough surface shave   skin biopsy (0.7 x 0.5 x 0.3 cm).  The specimen is inked blue and trisected.  The specimen is entirely   submitted in one chelle                          sette. (Dictated by:  Oswaldo Brock 2/25/2019 02:25 PM)    MICROSCOPIC:  A formal microscopic examination was performed.    CPT Codes  A: 30783-WZ2    TESTING LAB LOCATION:  [address] [address]  [phone]    COLLECTION SITE:  Client: [facility]  Location: [facility] (B)                ASSESSMENT:       ICD-10-CM    1. Essential hypertension, benign I10 hydrochlorothiazide (MICROZIDE) 12.5 MG capsule     spironolactone (ALDACTONE) 25 MG tablet     Basic metabolic panel     Aldosterone/Renin Activity Ratio   2. Hypertensive urgency I16.0 US Renal Complete w Doppler Complete     hydrochlorothiazide (MICROZIDE) 12.5 MG capsule     spironolactone (ALDACTONE) 25 MG tablet     Basic metabolic panel     Aldosterone/Renin Activity Ratio   3. Mixed hyperlipidemia E78.2    4. KARRIE (obstructive sleep apnea) G47.33 SLEEP EVALUATION & MANAGEMENT " REFERRAL - [facility] [phone] (Age 13 and up)   5. MS (multiple sclerosis) (H) G35    6. Hypothyroidism, unspecified type E03.9 TSH     T4 free   7. Hx of cardiac murmur Z86.79 Echocardiogram Complete   8. Hyperglycemia R73.9 Hemoglobin A1c         PLAN:   1.  We discussed her elevated blood pressures.  She has been on amlodipine in the past with swelling to her lower extremities which has resulted in plantar fasciitis.  She is on hydrochlorothiazide but had a drop her potassium.  She is currently on losartan 100 mg daily and metoprolol 100 mg XL daily.  Her blood pressure remains elevated.  We discussed starting hydrochlorothiazide 12.5 mg in conjunction with spironolactone 12.5 mg.  This should help her lower extremity swelling and hopefully keep her potassium up.  2.  She has a history of obstructive sleep apnea.  She will be referred for sleep study as this is likely playing a part in her uncontrolled blood pressures.  3.  She does report a history of a murmur which I did not appreciate today.  She feels she has good insurance and would like to have an echocardiogram to assess this.  4.  With concern for resistant hypertension, she will have a renal ultrasound of her kidneys to look for renal artery stenosis.  5.  Now that she is being started on hydrochlorothiazide and spironolactone, she should have labs in approximately 2 weeks.  She will have an A1c, BMP, aldosterone/renin ratio, T4, and TSH.  She has been told in the past that her thyroid function is low.  6.  She will be seen in approximately 1 month follow-up to reassess her blood pressure and her labs at that time.      Thank you for allowing me to participate in the care of your patient. Please do not hesitate to contact me if you have any questions.     Dorian Hunter, DO

## 2019-04-29 NOTE — NURSING NOTE
"Chief Complaint   Patient presents with     Hypertension     NPT Referral Dr MARIA GUADALUPE Adame       Initial /90   Pulse 68   Temp 98  F (36.7  C)   Ht 1.676 m (5' 6\")   Wt 95.7 kg (211 lb)   SpO2 99%   BMI 34.06 kg/m   Estimated body mass index is 34.06 kg/m  as calculated from the following:    Height as of this encounter: 1.676 m (5' 6\").    Weight as of this encounter: 95.7 kg (211 lb).  Medication Reconciliation: complete    Margaux Lee LPN  "

## 2019-05-03 ENCOUNTER — OFFICE VISIT (OUTPATIENT)
Dept: FAMILY MEDICINE | Facility: OTHER | Age: 54
End: 2019-05-03
Attending: PHYSICIAN ASSISTANT
Payer: COMMERCIAL

## 2019-05-03 VITALS
HEART RATE: 71 BPM | WEIGHT: 209 LBS | TEMPERATURE: 98.8 F | OXYGEN SATURATION: 96 % | SYSTOLIC BLOOD PRESSURE: 108 MMHG | BODY MASS INDEX: 33.73 KG/M2 | DIASTOLIC BLOOD PRESSURE: 74 MMHG

## 2019-05-03 DIAGNOSIS — J40 BRONCHITIS: Primary | ICD-10-CM

## 2019-05-03 PROCEDURE — 99213 OFFICE O/P EST LOW 20 MIN: CPT | Performed by: PHYSICIAN ASSISTANT

## 2019-05-03 RX ORDER — AZITHROMYCIN 250 MG/1
TABLET, FILM COATED ORAL
Qty: 6 TABLET | Refills: 0 | Status: SHIPPED | OUTPATIENT
Start: 2019-05-03 | End: 2019-05-20

## 2019-05-03 RX ORDER — CODEINE PHOSPHATE AND GUAIFENESIN 10; 100 MG/5ML; MG/5ML
1-2 SOLUTION ORAL EVERY 4 HOURS PRN
Qty: 180 ML | Refills: 0 | Status: SHIPPED | OUTPATIENT
Start: 2019-05-03 | End: 2019-12-18

## 2019-05-03 ASSESSMENT — ANXIETY QUESTIONNAIRES
6. BECOMING EASILY ANNOYED OR IRRITABLE: NOT AT ALL
7. FEELING AFRAID AS IF SOMETHING AWFUL MIGHT HAPPEN: NOT AT ALL
5. BEING SO RESTLESS THAT IT IS HARD TO SIT STILL: NOT AT ALL
1. FEELING NERVOUS, ANXIOUS, OR ON EDGE: NOT AT ALL
4. TROUBLE RELAXING: NOT AT ALL
3. WORRYING TOO MUCH ABOUT DIFFERENT THINGS: NOT AT ALL
GAD7 TOTAL SCORE: 0
2. NOT BEING ABLE TO STOP OR CONTROL WORRYING: NOT AT ALL

## 2019-05-03 ASSESSMENT — PATIENT HEALTH QUESTIONNAIRE - PHQ9: SUM OF ALL RESPONSES TO PHQ QUESTIONS 1-9: 0

## 2019-05-03 ASSESSMENT — PAIN SCALES - GENERAL: PAINLEVEL: MODERATE PAIN (4)

## 2019-05-03 NOTE — NURSING NOTE
"Chief Complaint   Patient presents with     URI       Initial /74   Pulse 71   Temp 98.8  F (37.1  C) (Tympanic)   Wt 94.8 kg (209 lb)   SpO2 96%   BMI 33.73 kg/m   Estimated body mass index is 33.73 kg/m  as calculated from the following:    Height as of 4/29/19: 1.676 m (5' 6\").    Weight as of this encounter: 94.8 kg (209 lb).  Medication Reconciliation: complete    Stefani Gaffney MA    "

## 2019-05-03 NOTE — PROGRESS NOTES
SUBJECTIVE:   Yvonne Llanos is a 53 year old female who presents to clinic today for the following   health issues:      RESPIRATORY SYMPTOMS      Duration: 2.5 weeks    Description  rhinorrhea, cough and ear pain both, fatigue.    Severity: moderate    Accompanying signs and symptoms: sore throat and fevers were last week and are gone now.    History (predisposing factors):  none    Precipitating or alleviating factors: None    Therapies tried and outcome:  robatussin          Additional history: as documented    Reviewed  and updated as needed this visit by clinical staff  Allergies         Reviewed and updated as needed this visit by Provider         Patient Active Problem List   Diagnosis     Hyperlipidemia LDL goal <130     Insulin resistance     Essential hypertension, benign     Rotator cuff tendonitis, left     MS (multiple sclerosis) (H)     Fibromyalgia     Tear film insufficiency     Status post bariatric surgery     Hypertensive urgency     Mixed hyperlipidemia     KARRIE (obstructive sleep apnea)     Hypothyroidism, unspecified type     Hx of cardiac murmur     Past Surgical History:   Procedure Laterality Date      x2       CHOLECYSTECTOMY       HYSTERECTOMY TOTAL ABDOMINAL, BILATERAL SALPINGO-OOPHORECTOMY, COMBINED  2001    Endometriosis     LAPAROSCOPIC GASTRIC SLEEVE  2014    [place]     LAPAROSCOPY      D&C, exploratory,  placental tissue adhered to uterus     rotator cuff tendon surgery Left      [facility]       Social History     Tobacco Use     Smoking status: Never Smoker     Smokeless tobacco: Never Used   Substance Use Topics     Alcohol use: Yes     Alcohol/week: 0.0 oz     Comment: rarely     Family History   Problem Relation Age of Onset     Hypertension Mother      Heart Failure Mother         Congestive     Other - See Comments Mother         marcos danlos??  hypermobility     Lipids Father         Hyperlipidemia     Cancer Father         Skin     Hypertension  Father      Prostate Cancer Father      Suicide Maternal Grandmother      Diabetes Maternal Grandfather      Clotting Disorder Maternal Grandfather      Diabetes Paternal Grandmother      Kidney Disease Paternal Grandmother      Other - See Comments Paternal Grandfather         old age     Depression Brother      Diabetes Brother         type 2     Myocardial Infarction Brother 32        ETOHic, +tobacco     Peripheral Vascular Disease Brother      Hyperlipidemia Brother      Other - See Comments Sister         Post Partum DVT     Other Cancer Sister         retinal tear     Known Genetic Syndrome Daughter         marcos danlos     Known Genetic Syndrome Daughter         marcos rehanas         Current Outpatient Medications   Medication Sig Dispense Refill     albuterol (PROAIR HFA/PROVENTIL HFA/VENTOLIN HFA) 108 (90 Base) MCG/ACT inhaler SHAKE WELL AND INHALE 2 PUFFS INTO THE LUNGS EVERY 6 HOURS AS NEEDED FOR SHORTNESS OF BREATH/ DYSPNEA OR WHEEZING 18 g 0     baclofen (LIORESAL) 10 MG tablet Take 10 mg by mouth 4 times daily  3     butalbital-acetaminophen-caffeine (FIORICET/ESGIC) -40 MG per tablet TAKE 1 TABLET BY MOUTH THREE TIMES WEEKLY AS NEEDED FOR HEADACHE 36 tablet 3     cholecalciferol (VITAMIN D3) 5000 UNITS CAPS capsule Take 1 capsule (5,000 Units) by mouth daily  0     diazepam (VALIUM) 5 MG tablet TK 1 T Q 8-12 H PRN FOR MSP  5     folic acid-vit B6-vit B12 (FOLGARD) 0.8-10-0.115 MG TABS Take 1 tablet by mouth daily       gabapentin (NEURONTIN) 400 MG capsule TAKE ONE CAPSULE BY MOUTH THREE TIMES DAILY 360 capsule 3     hydrochlorothiazide (MICROZIDE) 12.5 MG capsule Take 1 capsule (12.5 mg) by mouth daily 90 capsule 3     ketotifen (ZADITOR/REFRESH ANTI-ITCH) 0.025 % ophthalmic solution 1 drop 2 times daily       losartan (COZAAR) 100 MG tablet TAKE 1 TABLET BY MOUTH EVERY DAY 90 tablet 0     metoprolol succinate ER (TOPROL-XL) 100 MG 24 hr tablet Take 1 tablet (100 mg) by mouth daily 90 tablet  3     metoprolol succinate ER (TOPROL-XL) 25 MG 24 hr tablet Take 1 tablet (25 mg) by mouth daily 90 tablet 3     montelukast (SINGULAIR) 10 MG tablet TAKE 1 TABLET BY MOUTH EVERY EVENING 30 tablet 5     Multiple Vitamins-Minerals (MULTIVITAMIN GUMMIES ADULT) CHEW Take 2 tablets by mouth daily 30 tablet 0     spironolactone (ALDACTONE) 25 MG tablet Take 0.5 tablets (12.5 mg) by mouth daily 45 tablet 3     Allergies   Allergen Reactions     Ace Inhibitors Cough     Amitriptyline Other (See Comments) and Nausea     Nightmares     Codeine      Headache and nausea      Flagyl [Metronidazole] Nausea     Morphine Nausea     Promethazine      nausea     Seasonal      Sulfa Drugs        ROS:  Constitutional, HEENT, cardiovascular, pulmonary, gi and gu systems are negative, except as otherwise noted.    OBJECTIVE:                                                    /74   Pulse 71   Temp 98.8  F (37.1  C) (Tympanic)   Wt 94.8 kg (209 lb)   SpO2 96%   BMI 33.73 kg/m    Body mass index is 33.73 kg/m .  General: Alert. Oriented. In no acute distress  Skin: No suspicious rashes or lesions.  HEENT: EAC's and TM's intact. Posterior pharynx moist and pink without edema or exudate.erythema. Neck is supple. No anterior chain  lymphadenopathy palpable.   Resp: Scattered rhonchi. No wheeze, rales  Cardiac: RRR. Normal S1, S2. No murmur.  Psych: Mood euthymic with corresponding affect.               ASSESSMENT/PLAN:                                                    (J40) Bronchitis  (primary encounter diagnosis)  Plan: azithromycin (ZITHROMAX) 250 MG tablet,         guaiFENesin-codeine (ROBITUSSIN AC) 100-10         MG/5ML solution                Rest, increase fluids, Tylenol for fever or discomfort. Return to clinic if symptoms persist or worsen.      KIEL Gonzales  [facility]

## 2019-05-04 ASSESSMENT — ANXIETY QUESTIONNAIRES: GAD7 TOTAL SCORE: 0

## 2019-05-09 DIAGNOSIS — I16.0 HYPERTENSIVE URGENCY: ICD-10-CM

## 2019-05-09 DIAGNOSIS — E03.9 HYPOTHYROIDISM, UNSPECIFIED TYPE: ICD-10-CM

## 2019-05-09 DIAGNOSIS — I10 ESSENTIAL HYPERTENSION, BENIGN: ICD-10-CM

## 2019-05-09 DIAGNOSIS — R73.9 HYPERGLYCEMIA: ICD-10-CM

## 2019-05-09 LAB
ANION GAP SERPL CALCULATED.3IONS-SCNC: 4 MMOL/L (ref 3–14)
BUN SERPL-MCNC: 15 MG/DL (ref 7–30)
CALCIUM SERPL-MCNC: 9.2 MG/DL (ref 8.5–10.1)
CHLORIDE SERPL-SCNC: 104 MMOL/L (ref 94–109)
CO2 SERPL-SCNC: 31 MMOL/L (ref 20–32)
CREAT SERPL-MCNC: 0.71 MG/DL (ref 0.52–1.04)
EST. AVERAGE GLUCOSE BLD GHB EST-MCNC: 117 MG/DL
GFR SERPL CREATININE-BSD FRML MDRD: >90 ML/MIN/{1.73_M2}
GLUCOSE SERPL-MCNC: 86 MG/DL (ref 70–99)
HBA1C MFR BLD: 5.7 % (ref 0–5.6)
POTASSIUM SERPL-SCNC: 3.9 MMOL/L (ref 3.4–5.3)
SODIUM SERPL-SCNC: 139 MMOL/L (ref 133–144)
T4 FREE SERPL-MCNC: 0.79 NG/DL (ref 0.76–1.46)
TSH SERPL DL<=0.005 MIU/L-ACNC: 3.62 MU/L (ref 0.4–4)

## 2019-05-09 PROCEDURE — 80048 BASIC METABOLIC PNL TOTAL CA: CPT | Performed by: INTERNAL MEDICINE

## 2019-05-09 PROCEDURE — 36415 COLL VENOUS BLD VENIPUNCTURE: CPT | Performed by: INTERNAL MEDICINE

## 2019-05-09 PROCEDURE — 99000 SPECIMEN HANDLING OFFICE-LAB: CPT | Performed by: INTERNAL MEDICINE

## 2019-05-09 PROCEDURE — 82088 ASSAY OF ALDOSTERONE: CPT | Mod: 90 | Performed by: INTERNAL MEDICINE

## 2019-05-09 PROCEDURE — 84443 ASSAY THYROID STIM HORMONE: CPT | Performed by: INTERNAL MEDICINE

## 2019-05-09 PROCEDURE — 40000788 ZZHCL STATISTIC ESTIMATED AVERAGE GLUCOSE: Performed by: INTERNAL MEDICINE

## 2019-05-09 PROCEDURE — 83036 HEMOGLOBIN GLYCOSYLATED A1C: CPT | Performed by: INTERNAL MEDICINE

## 2019-05-09 PROCEDURE — 84439 ASSAY OF FREE THYROXINE: CPT | Performed by: INTERNAL MEDICINE

## 2019-05-09 PROCEDURE — 84244 ASSAY OF RENIN: CPT | Mod: 90 | Performed by: INTERNAL MEDICINE

## 2019-05-10 ENCOUNTER — HOSPITAL ENCOUNTER (OUTPATIENT)
Dept: ULTRASOUND IMAGING | Facility: HOSPITAL | Age: 54
End: 2019-05-10
Attending: INTERNAL MEDICINE
Payer: COMMERCIAL

## 2019-05-10 ENCOUNTER — HOSPITAL ENCOUNTER (OUTPATIENT)
Dept: CARDIOLOGY | Facility: HOSPITAL | Age: 54
Discharge: HOME | End: 2019-05-10
Attending: INTERNAL MEDICINE | Admitting: INTERNAL MEDICINE
Payer: COMMERCIAL

## 2019-05-10 DIAGNOSIS — Z86.79 HX OF CARDIAC MURMUR: ICD-10-CM

## 2019-05-10 DIAGNOSIS — I16.0 HYPERTENSIVE URGENCY: ICD-10-CM

## 2019-05-10 PROCEDURE — 93306 TTE W/DOPPLER COMPLETE: CPT | Mod: TC

## 2019-05-10 PROCEDURE — 93306 TTE W/DOPPLER COMPLETE: CPT | Mod: 26 | Performed by: INTERNAL MEDICINE

## 2019-05-10 PROCEDURE — 93975 VASCULAR STUDY: CPT | Mod: TC

## 2019-05-11 LAB
ALDOST SERPL-MCNC: 6.6 NG/DL
ALDOST/RENIN PLAS-RTO: 0.8 RATIO
RENIN PLAS-CCNC: 8.6 NG/ML/HR

## 2019-05-14 DIAGNOSIS — E66.9 OBESITY, UNSPECIFIED CLASSIFICATION, UNSPECIFIED OBESITY TYPE, UNSPECIFIED WHETHER SERIOUS COMORBIDITY PRESENT: Chronic | ICD-10-CM

## 2019-05-14 DIAGNOSIS — Z98.84 STATUS POST BARIATRIC SURGERY: Chronic | ICD-10-CM

## 2019-05-14 DIAGNOSIS — G47.33 OSA (OBSTRUCTIVE SLEEP APNEA): Chronic | ICD-10-CM

## 2019-05-14 DIAGNOSIS — I1A.0 RESISTANT HYPERTENSION: Primary | ICD-10-CM

## 2019-05-14 DIAGNOSIS — I10 ESSENTIAL HYPERTENSION, BENIGN: Chronic | ICD-10-CM

## 2019-05-14 NOTE — Clinical Note
Recommend Polysomnogram (using 4% desaturation/Medicare/2012 AASM 1B scoring rules) for re-evaluation of obstructive sleep apnea.  Orders placed

## 2019-05-14 NOTE — PROGRESS NOTES
SLEEP HISTORY QUESTIONNAIRE    Please describe the main reason for your sleep appointment? Hypertension    How long has this been a problem? Many years    Have you been diagnosed with a sleep problem in the past? YES    If so, what? apnea    What treatment was recommended? CPAP    Have you had a sleep study in the past? YES    If yes, where and when? [facility]    Sleep Habits:   Do you read in bed? No  Do you eat in bed? No  Do you watch TV in bed? No  Do you work in bed? No  Do you use a phone or computer in bed? No    Is you sleep disturbed by:   Bed partner: No  Children: No  Noise: No   Pets: No  Other:        On two or more nights per week, do you drink alcohol to help you fall asleep?NO    On two or more nights per week, do you take melatonin to help you fall asleep? NO    On two or more nights per week, do you take over the counter medicine to fall asleep?  NO    Do you take drinks with caffeine (coffee, tea, soda, energy drinks)? YES    Do you have 3 or more caffeine drinks in a day? NO    Do you have caffeine drinks within 6 hours of bedtime? NO    Do you smoke or use tobacco? NO    Do you exercise? YES    Sleep Routine:   Using a 24 Hour Clock    What time do you usually get into bed on workdays? 9 pm    Weekend/non work days? 10 pm    What time do you get out of bed on workdays? 4:30 am      Weekend/non work days?5 am    Do you work the evening or night shift or do your shifts rotate? NO    How long does it usually take to fall to sleep? 15 min    How many times do you wake during the night? 1    How much time do you feel that you are awake during the entire night? depends    How long does it take for you to fall back to sleep after you wake up? depends    Why do you think you wake up?  gets up early    What do you do when you wake up? Go back to sleep    How much sleep do you think you get on work nights? 6 hours    How much sleep do you think you get on weekends/non work days? 7  hours    How much sleep do you think you need to feel your best? 8 hours    How many days during a week do you take a nap on average? 2    What is the average length of your naps? 2 hours    Do you feel better after taking a nap? YES    If you could chose the best sleep schedule for you, what time would you go to bed? 10 pm  What time would you get up? 6 am    Do you read in bed? NO    Do you eat in bed? NO    Do you watch TV in bed? NO    Do you do work in bed? NO    Do you use a computer or phone in bed? NO    Sleep Disruptions?   Leg movements:  Do you ever have restless, crawling, aching or other unusual feelings in your legs? NO    Do you ever wake yourself by kicking your legs during the night? NO    Are the sheets and blankets messed up or tossed about when you get up? NO    Night-time behaviors:   Do you have nightmares or night terrors? NO   How often?      Have you had times when you were sleep walking? NO    Have you been seen doing anything unusual while you sleep at nights? NO  What?    How often?      Have you ever hurt yourself or someone else while you were sleeping? NO  Please describe:      Do you clench or grind your teeth during the night? no    Sleep Apnea (pauses in breathing during sleep):  Do you wake with a headache in the morning? YES  How often? 1/week    Does your bed partner, family or friends ever say that you snore? YES  How many nights per week do you snore?    Can snoring be heard outside the bedroom?      Do you ever wake yourself up from snoring, gasping or choking? NO    Have you ever been told that you stop breathing or have pauses in your breathing? YES    Do you wake in the morning with a dry throat or mouth? NO    Do you have trouble breathing through your nose? NO    Do you have problems with heartburn, reflux or a hiatal hernia? NO    Which positions do you usually sleep in? (stomach, back, sides, all) sides    Do you use oxygen or any other medical equipment when you sleep?  NO    Do members of your family (related by blood) snore? YES    Have any members of your family been diagnosed with with sleep apnea? NO    Do other members of your family have restless leg? NO    Do other members of your family have sleep walking? NO    Have you ever had an accident, or near accident due to sleepiness while driving? NO    Does your sleepiness affect your work on the job or at school? YES rarely    Do you ever fall asleep by accident while doing a task? NO    Have you had sudden muscle weakness when laughing, angry or surprised? NO    Have you ever been unable to move your body when falling asleep or waking up? NO    Do you ever have trouble  your dreams from real life events? NO  Please describe:      Physical Health: (including illness and injury): During the past 30 days, on how many days was your physical health not good? 20/30 days     Mental Health: (including stress, depression, and problems with emotions): During the last 30 days, how may days was your mental health not good? 0/30 days.     During the past 30 days, on how many days did poor physical or mental health keep you from doing your usual activities? This might be self-care, work, or play? 30 days.     Social History:   Marital status:     Who lives in your home with you?     Mother (alive or dead)? alive If has , from what?    Father (alive or dead)? alive If has , from what?      Siblings: YES  Have any ? NO  If so, from what?      Currently working? YES  If yes, work:   Former jobs:       Sleepiness Scale:   Sitting and reading 0   Watching TV 2   Sitting in a public place 0   Riding in a car 0   Lying down to rest in the afternoon 3   Sitting and talking to someone 0   Sitting quietly after a lunch without alcohol 0   In a car, stopping for a few minutes in traffic 0       Surgical History:   Past Surgical History:   Procedure Laterality Date      x2        CHOLECYSTECTOMY       HYSTERECTOMY TOTAL ABDOMINAL, BILATERAL SALPINGO-OOPHORECTOMY, COMBINED  01/01/2001    Endometriosis     LAPAROSCOPIC GASTRIC SLEEVE  1/2014    [facility]     LAPAROSCOPY  1997    D&C, exploratory,  placental tissue adhered to uterus     rotator cuff tendon surgery Left  2015    [facility]       Medical Conditions:   Past Medical History:   Diagnosis Date     [place]     mild[place] issues since [place]     Dyslipidemia 08/24/2011     Endometriosis      Fibromyalgia      Hypertension 08/24/2011     Migraines      Other abnormal glucose 08/24/2011     Pancreatitis due to biliary obstruction     improved after cholecystectomy     Seasonal Allergies 08/24/2011     Sleep apnea     on CPAP pressure of 8, resolved s/p  gastric sleeve       Medications:   Current Outpatient Medications   Medication Sig     albuterol (PROAIR HFA/PROVENTIL HFA/VENTOLIN HFA) 108 (90 Base) MCG/ACT inhaler SHAKE WELL AND INHALE 2 PUFFS INTO THE LUNGS EVERY 6 HOURS AS NEEDED FOR SHORTNESS OF BREATH/ DYSPNEA OR WHEEZING     baclofen (LIORESAL) 10 MG tablet Take 10 mg by mouth 4 times daily     butalbital-acetaminophen-caffeine (FIORICET/ESGIC) -40 MG per tablet TAKE 1 TABLET BY MOUTH THREE TIMES WEEKLY AS NEEDED FOR HEADACHE     cholecalciferol (VITAMIN D3) 5000 UNITS CAPS capsule Take 1 capsule (5,000 Units) by mouth daily     diazepam (VALIUM) 5 MG tablet TK 1 T Q 8-12 H PRN FOR MSP     folic acid-vit B6-vit B12 (FOLGARD) 0.8-10-0.115 MG TABS Take 1 tablet by mouth daily     gabapentin (NEURONTIN) 400 MG capsule TAKE ONE CAPSULE BY MOUTH THREE TIMES DAILY     guaiFENesin-codeine (ROBITUSSIN AC) 100-10 MG/5ML solution Take 5-10 mLs by mouth every 4 hours as needed for cough     hydrochlorothiazide (MICROZIDE) 12.5 MG capsule Take 1 capsule (12.5 mg) by mouth daily     ketotifen (ZADITOR/REFRESH ANTI-ITCH) 0.025 % ophthalmic solution 1 drop 2 times daily     losartan (COZAAR) 100 MG tablet  TAKE 1 TABLET BY MOUTH EVERY DAY     metoprolol succinate ER (TOPROL-XL) 100 MG 24 hr tablet Take 1 tablet (100 mg) by mouth daily     metoprolol succinate ER (TOPROL-XL) 25 MG 24 hr tablet Take 1 tablet (25 mg) by mouth daily     montelukast (SINGULAIR) 10 MG tablet TAKE 1 TABLET BY MOUTH EVERY EVENING     Multiple Vitamins-Minerals (MULTIVITAMIN GUMMIES ADULT) CHEW Take 2 tablets by mouth daily     spironolactone (ALDACTONE) 25 MG tablet Take 0.5 tablets (12.5 mg) by mouth daily     No current facility-administered medications for this visit.        Are you currently having any of the following symptoms?   General:   Obvious weight gain or loss NO  Fever, chills or sweats NO  Drug allergies: yes Sulfa    Eyes:   Changes in vision NO  Blind spots NO  Double vision YES  Other      Ear, Nose and Throat:   Ear pain NO  Sore throat NO  Sinus pain NO  Post-nasal drip YES  Runny nose NO  Bloody nose NO    Heart:   Rapid or irregular heart beat NO  Chest pain or pressure NO  Out of breath when lying down NO  Swelling in feet or legs NO  High blood pressure YES  Heart disease NO    Nervous system   Headaches YES  Weakness in arms or legs YES  Numbness in arms of legs NO  Other:      Skin  Rashes NO  New moles or skin changes NO  Other      Lungs  Shortness of breath at rest NO  Shortness of breath with activity NO  Dry cough NO  Coughing up mucous or phlegm YES  Coughing up blood NO  Wheezing when breathing YES    Lymph System  Swollen lymph nodes NO  New lumps or bumps NO  Changes in breasts or discharge NO    Digestive System   Nausea or vomiting NO  Loose or watery stools NO  Hard, dry stools (constipation) NO  Fat or grease in stools NO  Blood in stools NO  Stools are black or bloody NO  Abdominal (belly) pain NO    Urinary Tract   Pain when you urinate (pee) NO  Blood in your urine NO  Urinate (pee) more than normal NO  Irregular periods NO    Muscles and bones   Muscle pain YES  Joint or bone pain YES  Swollen joints  NO  Other      Glands  Increased thirst or urination NO  Diabetes NO  Morning glucose:    Afternoon glucose:      Mental Health  Depression NO  Anxiety NO  Other mental health issues:     STOP BANG       Name: Yvonne Llanos MRN# [id number]   Age: 53 year old YOB: 1965     Stop Bang questionnaire completed with a score of >3 to allow for HST     Have you been told you snore loudly (louder than talking or loud enough to be heard through doors)? YES    Do you often feel tired, fatigued, or sleepy during the daytime?    Has anyone observed you stop breathing during your sleep? NO    Do you have or are you being treated for high blood pressure? YES    Is your BMI greater than 35? NO    Is your neck size circumference 16 inches or greater?  ?    Are you over 50 years old? YES    Stop Bang Score (# of yes): 3

## 2019-05-17 NOTE — PROGRESS NOTES
SUBJECTIVE:   Yvonne Llanos is a 53 year old female who presents to clinic today for the following   health issues:    Hypothyroidism Follow-up      Since last visit, patient describes the following symptoms: Weight stable, no hair loss, no skin changes, no constipation, no loose stools  Does have a lot of symptoms but feels they are attributed to other DX she has. Had recent labs done on 19 which were normal. Patient would like for you to look at the Echo she had got. Dr. Hunter had sent her a summary and she would like to discuss.      Amount of exercise or physical activity: 2-3 days/week for an average of 30-45 minutes    Problems taking medications regularly: No    Medication side effects: none, some make her sleepy    Diet: low salt and low sugar        PROBLEMS TO ADD ON...    Additional history: review today.  Worried about CHF.  We reviewed echo and discussed.  No thyroid sx.  We discussed how this all works.  Also the labs and the bp.  See below.      Reviewed  and updated as needed this visit by clinical staff         Reviewed and updated as needed this visit by Provider         Patient Active Problem List   Diagnosis     Hyperlipidemia LDL goal <130     Insulin resistance     Essential hypertension, benign     Rotator cuff tendonitis, left     MS (multiple sclerosis) (H)     Fibromyalgia     Tear film insufficiency     Status post bariatric surgery     Hypertensive urgency     Mixed hyperlipidemia     KARRIE (obstructive sleep apnea)     Subclinical hypothyroidism     Hx of cardiac murmur     Diastolic dysfunction     Past Surgical History:   Procedure Laterality Date      x2       CHOLECYSTECTOMY       HYSTERECTOMY TOTAL ABDOMINAL, BILATERAL SALPINGO-OOPHORECTOMY, COMBINED  2001    Endometriosis     LAPAROSCOPIC GASTRIC SLEEVE  2014    [facility]     LAPAROSCOPY      D&C, exploratory,  placental tissue adhered to uterus     rotator cuff tendon surgery Left      [facility]        Social History     Tobacco Use     Smoking status: Never Smoker     Smokeless tobacco: Never Used   Substance Use Topics     Alcohol use: Yes     Alcohol/week: 0.0 oz     Comment: rarely     Family History   Problem Relation Age of Onset     Hypertension Mother      Heart Failure Mother         Congestive     Other - See Comments Mother         marcos mitchell??  hypermobility     Lipids Father         Hyperlipidemia     Cancer Father         Skin     Hypertension Father      Prostate Cancer Father      Suicide Maternal Grandmother      Diabetes Maternal Grandfather      Clotting Disorder Maternal Grandfather      Diabetes Paternal Grandmother      Kidney Disease Paternal Grandmother      Other - See Comments Paternal Grandfather         old age     Depression Brother      Diabetes Brother         type 2     Myocardial Infarction Brother 32        ETOHic, +tobacco     Peripheral Vascular Disease Brother      Hyperlipidemia Brother      Other - See Comments Sister         Post Partum DVT     Other Cancer Sister         retinal tear     Known Genetic Syndrome Daughter         marcos mitchell     Known Genetic Syndrome Daughter         marcos mitchell         Current Outpatient Medications   Medication Sig Dispense Refill     albuterol (PROAIR HFA/PROVENTIL HFA/VENTOLIN HFA) 108 (90 Base) MCG/ACT inhaler SHAKE WELL AND INHALE 2 PUFFS INTO THE LUNGS EVERY 6 HOURS AS NEEDED FOR SHORTNESS OF BREATH/ DYSPNEA OR WHEEZING 18 g 0     baclofen (LIORESAL) 10 MG tablet Take 10 mg by mouth 4 times daily  3     butalbital-acetaminophen-caffeine (FIORICET/ESGIC) -40 MG per tablet TAKE 1 TABLET BY MOUTH THREE TIMES WEEKLY AS NEEDED FOR HEADACHE 36 tablet 3     cholecalciferol (VITAMIN D3) 5000 UNITS CAPS capsule Take 1 capsule (5,000 Units) by mouth daily  0     diazepam (VALIUM) 5 MG tablet TK 1 T Q 8-12 H PRN FOR MSP  5     folic acid-vit B6-vit B12 (FOLGARD) 0.8-10-0.115 MG TABS Take 1 tablet by mouth daily       gabapentin  (NEURONTIN) 400 MG capsule TAKE ONE CAPSULE BY MOUTH THREE TIMES DAILY 360 capsule 3     hydrochlorothiazide (MICROZIDE) 12.5 MG capsule Take 1 capsule (12.5 mg) by mouth daily 90 capsule 3     ketotifen (ZADITOR/REFRESH ANTI-ITCH) 0.025 % ophthalmic solution 1 drop 2 times daily       losartan (COZAAR) 100 MG tablet TAKE 1 TABLET BY MOUTH EVERY DAY 90 tablet 0     metoprolol succinate ER (TOPROL-XL) 100 MG 24 hr tablet Take 1 tablet (100 mg) by mouth daily 90 tablet 3     montelukast (SINGULAIR) 10 MG tablet TAKE 1 TABLET BY MOUTH EVERY EVENING 30 tablet 5     Multiple Vitamins-Minerals (MULTIVITAMIN GUMMIES ADULT) CHEW Take 2 tablets by mouth daily 30 tablet 0     spironolactone (ALDACTONE) 25 MG tablet Take 0.5 tablets (12.5 mg) by mouth daily 45 tablet 3     guaiFENesin-codeine (ROBITUSSIN AC) 100-10 MG/5ML solution Take 5-10 mLs by mouth every 4 hours as needed for cough (Patient not taking: Reported on 5/20/2019) 180 mL 0     metoprolol succinate ER (TOPROL-XL) 25 MG 24 hr tablet Take 1 tablet (25 mg) by mouth daily (Patient not taking: Reported on 5/20/2019) 90 tablet 3     Allergies   Allergen Reactions     Ace Inhibitors Cough     Amitriptyline Other (See Comments) and Nausea     Nightmares     Codeine      Headache and nausea      Flagyl [Metronidazole] Nausea     Morphine Nausea     Promethazine      nausea     Seasonal      Sulfa Drugs        ROS:  Constitutional, HEENT, cardiovascular, pulmonary, gi and gu systems are negative, except as otherwise noted.    OBJECTIVE:                                                    /82   Pulse 60   Temp 98.6  F (37  C) (Tympanic)   Wt 94.3 kg (208 lb)   SpO2 97%   BMI 33.57 kg/m    Body mass index is 33.57 kg/m .  GENERAL APPEARANCE: Alert, no acute distress  CV: regular rate and rhythm, no murmur, rub or gallop  RESP: lungs clear to auscultation bilaterally  ABDOMEN: normal bowel sounds, soft, nontender, no hepatosplenomegaly or other masses  SKIN: no  suspicious lesions or rashes to visualized skin  NEURO: Alert, oriented x 3, speech and mentation normal      Reviewed.       ASSESSMENT/PLAN:                                                    1. Hypothyroidism, unspecified type  Stable.  Update q 6 months.      2. Subclinical hypothyroidism  As above.      3. Diastolic dysfunction  Discussed.  No chf sx.  I told her we could call it more of a dysfn than a heart failure.  Reviewed echo.      4. Essential hypertension, benign  Stable.      5. Elevated hemoglobin A1c  Discussed.  Follow q 6 months.  Diet/ex.        25 minutes spent with patient over 50% in counseling about the thyroid, heart, blood sugar, and bp.      Cholo Adame MD  [facility]

## 2019-05-20 ENCOUNTER — OFFICE VISIT (OUTPATIENT)
Dept: FAMILY MEDICINE | Facility: OTHER | Age: 54
End: 2019-05-20
Attending: FAMILY MEDICINE
Payer: COMMERCIAL

## 2019-05-20 VITALS
HEART RATE: 60 BPM | SYSTOLIC BLOOD PRESSURE: 116 MMHG | TEMPERATURE: 98.6 F | WEIGHT: 208 LBS | OXYGEN SATURATION: 97 % | DIASTOLIC BLOOD PRESSURE: 82 MMHG | BODY MASS INDEX: 33.57 KG/M2

## 2019-05-20 DIAGNOSIS — E03.8 SUBCLINICAL HYPOTHYROIDISM: ICD-10-CM

## 2019-05-20 DIAGNOSIS — R73.09 ELEVATED HEMOGLOBIN A1C: ICD-10-CM

## 2019-05-20 DIAGNOSIS — I10 ESSENTIAL HYPERTENSION, BENIGN: ICD-10-CM

## 2019-05-20 DIAGNOSIS — E03.9 HYPOTHYROIDISM, UNSPECIFIED TYPE: Primary | ICD-10-CM

## 2019-05-20 DIAGNOSIS — I51.89 DIASTOLIC DYSFUNCTION: ICD-10-CM

## 2019-05-20 PROCEDURE — 99214 OFFICE O/P EST MOD 30 MIN: CPT | Performed by: FAMILY MEDICINE

## 2019-05-20 ASSESSMENT — PAIN SCALES - GENERAL: PAINLEVEL: MILD PAIN (2)

## 2019-05-20 ASSESSMENT — PATIENT HEALTH QUESTIONNAIRE - PHQ9: SUM OF ALL RESPONSES TO PHQ QUESTIONS 1-9: 0

## 2019-05-20 NOTE — NURSING NOTE
"Chief Complaint   Patient presents with     Thyroid Problem       Initial /82   Pulse 60   Temp 98.6  F (37  C) (Tympanic)   Wt 94.3 kg (208 lb)   SpO2 97%   BMI 33.57 kg/m   Estimated body mass index is 33.57 kg/m  as calculated from the following:    Height as of 4/29/19: 1.676 m (5' 6\").    Weight as of this encounter: 94.3 kg (208 lb).  Medication Reconciliation: complete    Stefani Gaffney MA    "

## 2019-05-30 ENCOUNTER — OFFICE VISIT (OUTPATIENT)
Dept: CARDIOLOGY | Facility: OTHER | Age: 54
End: 2019-05-30
Payer: COMMERCIAL

## 2019-05-30 VITALS
TEMPERATURE: 97.8 F | BODY MASS INDEX: 33.89 KG/M2 | RESPIRATION RATE: 18 BRPM | HEART RATE: 72 BPM | SYSTOLIC BLOOD PRESSURE: 148 MMHG | WEIGHT: 210 LBS | DIASTOLIC BLOOD PRESSURE: 98 MMHG

## 2019-05-30 DIAGNOSIS — I1A.0 RESISTANT HYPERTENSION: Primary | ICD-10-CM

## 2019-05-30 DIAGNOSIS — G47.33 OSA (OBSTRUCTIVE SLEEP APNEA): ICD-10-CM

## 2019-05-30 DIAGNOSIS — I10 ESSENTIAL HYPERTENSION, BENIGN: ICD-10-CM

## 2019-05-30 DIAGNOSIS — E78.5 HYPERLIPIDEMIA LDL GOAL <130: ICD-10-CM

## 2019-05-30 DIAGNOSIS — E78.2 MIXED HYPERLIPIDEMIA: Primary | ICD-10-CM

## 2019-05-30 DIAGNOSIS — E78.2 MIXED HYPERLIPIDEMIA: ICD-10-CM

## 2019-05-30 DIAGNOSIS — I51.89 DIASTOLIC DYSFUNCTION: ICD-10-CM

## 2019-05-30 DIAGNOSIS — E66.9 OBESITY, UNSPECIFIED CLASSIFICATION, UNSPECIFIED OBESITY TYPE, UNSPECIFIED WHETHER SERIOUS COMORBIDITY PRESENT: ICD-10-CM

## 2019-05-30 DIAGNOSIS — R06.09 DYSPNEA ON EXERTION: ICD-10-CM

## 2019-05-30 DIAGNOSIS — I16.0 HYPERTENSIVE URGENCY: ICD-10-CM

## 2019-05-30 DIAGNOSIS — R53.83 OTHER FATIGUE: ICD-10-CM

## 2019-05-30 DIAGNOSIS — G35 MS (MULTIPLE SCLEROSIS) (H): ICD-10-CM

## 2019-05-30 LAB
ANION GAP SERPL CALCULATED.3IONS-SCNC: 8 MMOL/L (ref 3–14)
BUN SERPL-MCNC: 15 MG/DL (ref 7–25)
CALCIUM SERPL-MCNC: 9.7 MG/DL (ref 8.6–10.3)
CHLORIDE SERPL-SCNC: 103 MMOL/L (ref 98–107)
CHOLEST SERPL-MCNC: 245 MG/DL
CO2 SERPL-SCNC: 30 MMOL/L (ref 21–31)
CREAT SERPL-MCNC: 0.81 MG/DL (ref 0.6–1.2)
ERYTHROCYTE [DISTWIDTH] IN BLOOD BY AUTOMATED COUNT: 13.1 % (ref 10–15)
GFR SERPL CREATININE-BSD FRML MDRD: 74 ML/MIN/{1.73_M2}
GLUCOSE SERPL-MCNC: 90 MG/DL (ref 70–105)
HCT VFR BLD AUTO: 40.3 % (ref 35–47)
HDLC SERPL-MCNC: 57 MG/DL (ref 23–92)
HGB BLD-MCNC: 13.2 G/DL (ref 11.7–15.7)
LDLC SERPL CALC-MCNC: 148 MG/DL
MCH RBC QN AUTO: 29 PG (ref 26.5–33)
MCHC RBC AUTO-ENTMCNC: 32.8 G/DL (ref 31.5–36.5)
MCV RBC AUTO: 89 FL (ref 78–100)
NONHDLC SERPL-MCNC: 188 MG/DL
NT-PROBNP SERPL-MCNC: 27 PG/ML (ref 0–100)
PLATELET # BLD AUTO: 221 10E9/L (ref 150–450)
POTASSIUM SERPL-SCNC: 3.8 MMOL/L (ref 3.5–5.1)
RBC # BLD AUTO: 4.55 10E12/L (ref 3.8–5.2)
SODIUM SERPL-SCNC: 141 MMOL/L (ref 134–144)
TRIGL SERPL-MCNC: 200 MG/DL
WBC # BLD AUTO: 6.4 10E9/L (ref 4–11)

## 2019-05-30 PROCEDURE — 85027 COMPLETE CBC AUTOMATED: CPT | Mod: ZL | Performed by: INTERNAL MEDICINE

## 2019-05-30 PROCEDURE — 80048 BASIC METABOLIC PNL TOTAL CA: CPT | Mod: ZL | Performed by: INTERNAL MEDICINE

## 2019-05-30 PROCEDURE — 36415 COLL VENOUS BLD VENIPUNCTURE: CPT | Mod: ZL | Performed by: INTERNAL MEDICINE

## 2019-05-30 PROCEDURE — 99215 OFFICE O/P EST HI 40 MIN: CPT | Performed by: INTERNAL MEDICINE

## 2019-05-30 PROCEDURE — 80061 LIPID PANEL: CPT | Mod: ZL | Performed by: INTERNAL MEDICINE

## 2019-05-30 PROCEDURE — 83880 ASSAY OF NATRIURETIC PEPTIDE: CPT | Mod: ZL | Performed by: INTERNAL MEDICINE

## 2019-05-30 RX ORDER — ATORVASTATIN CALCIUM 40 MG/1
40 TABLET, FILM COATED ORAL AT BEDTIME
Qty: 90 TABLET | Refills: 3 | Status: SHIPPED | OUTPATIENT
Start: 2019-05-30 | End: 2019-07-18

## 2019-05-30 RX ORDER — CARVEDILOL 12.5 MG/1
12.5 TABLET ORAL 2 TIMES DAILY WITH MEALS
Qty: 180 TABLET | Refills: 3 | Status: SHIPPED | OUTPATIENT
Start: 2019-05-30 | End: 2020-05-19

## 2019-05-30 RX ORDER — SPIRONOLACTONE 25 MG/1
25 TABLET ORAL DAILY
Qty: 90 TABLET | Refills: 3 | Status: SHIPPED | OUTPATIENT
Start: 2019-05-30 | End: 2020-05-19

## 2019-05-30 RX ORDER — HYDROCHLOROTHIAZIDE 25 MG/1
25 TABLET ORAL DAILY
Qty: 90 TABLET | Refills: 3 | Status: SHIPPED | OUTPATIENT
Start: 2019-05-30 | End: 2019-08-26

## 2019-05-30 ASSESSMENT — PAIN SCALES - GENERAL: PAINLEVEL: MILD PAIN (3)

## 2019-05-30 NOTE — PATIENT INSTRUCTIONS
You were seen by  Dorian Hunter, DO      1. CONTINUE Spironolactone 25 mg once daily.      2. STOP taking Metoprolol.     3. START taking Carvedilol 12.5 mg twice daily.     4. INCREASE Hydrochlorothiazide to 25 mg once daily.     5. Laboratory blood work has been ordered.  You will be notified by phone call or [employer] message when the results are available.       You will follow up with [facility] in 1 month, sooner if needed.       Please call the cardiology office with problems, questions, or concerns at [phone].    If you experience chest pain, chest pressure, chest tightness, shortness of breath, fainting, lightheadedness, nausea, vomiting, or other concerning symptoms, please report to the Emergency Department or call 911. These symptoms may be emergent, and best treated in the Emergency Department.     KOSTA MarcelinoN, RN  [facility]  [phone]

## 2019-05-30 NOTE — NURSING NOTE
"Patient comes in for one month follow up. Has been taking a whole pill of Aldactone instead of 1/2.   Yumiko Barrios LPN ....................5/30/2019   10:22 AM  Chief Complaint   Patient presents with     Follow Up     one month- tests       Initial BP (!) 138/92 (BP Location: Right arm, Patient Position: Sitting, Cuff Size: Adult Large)   Pulse 72   Temp 97.8  F (36.6  C) (Tympanic)   Resp 18   Wt 95.3 kg (210 lb)   BMI 33.89 kg/m   Estimated body mass index is 33.89 kg/m  as calculated from the following:    Height as of 4/29/19: 1.676 m (5' 6\").    Weight as of this encounter: 95.3 kg (210 lb).  Meds Reconciled: complete  Pt is not on Aspirin  Pt is not on a Statin  PHQ and/or DIA reviewed. Pt referred to PCP/MH Provider as appropriate.    Yumiko Barrios LPN        "

## 2019-05-30 NOTE — PROGRESS NOTES
Cardiology Progress Note     Assessment & Plan   Yvonne Llanos is a 53 year old female who is being seen in follow-up to visit from 4/29/2019.  She has been followed secondary to uncontrolled hypertension with resistant hypertension and hypertensive urgency with blood pressures in the 200's.    She struggles with control of her blood pressure.  She is currently on hydrochlorothiazide 12 .5 mg daily, losartan 100 mg daily, metoprolol 100 mg XL daily, and spinal lactone 25 mg daily.  She has been on amlodipine in the past but this has resulted in swelling to her lower extremities which has caused her plantar fasciitis to flare.  She has been on hydrochlorothiazide which controlled her blood pressure well but it has caused her potassium to drop.  More recently, her potassium has been controlled with the addition of spironolactone with hydrochlorothiazide. She has an automated machine which she does not feel comfortable using and has not purchased a manual blood pressure cuff at this point.  Her blood pressure in the chart has ranged from the 120's to 200's.  Her EKG on 4/29/2019 was concerning for LVH.      She is known to have obstructive sleep apnea but this has not been treated. She was referred for consideration of KARRIE evaluation on 4/29/2019 with work-up in progress.  We also discussed other secondary causes of hypertension which she is willing to have evaluated as she feels she has good insurance. She does not presently have lower extremity edema.     She has a family history of heart disease with a brother dying in his 30's from heart disease and a father who had heart disease in his 70's.  She herself has not had any chest pain or chest tightness.  She denies any palpitations or racing heart.  She has not had stenting or bypass.       She had an echo completed on 5/10/2019 that showed diastolic dysfunction grade 1 likely secondary to uncontrolled hypertension, trace MR, trace TR, and mild calcification of  the aortic valve.    Impression:  1.  Resistant hypertension.  2.  Benign essential hypertension with history of hypertensive urgency.  3.  Ongoing fatigue.  4.  KARRIE with history of CPAP without usage for 2 years.  She was referred to pulmonary for consideration of sleep study/treatment on 4/29/2019.  5.  Diastolic dysfunction grade 1 on 5/10/2019.  6.  Dyspnea on exertion.  7.  MS.  8.  Obesity.    Plan:  1.  She will continue on spironolactone 25 mg daily and losartan 100 mg daily without changes.  2.  Hydrochlorothiazide 12.5 mg daily will be increased to 25 mg daily.  3.  She will discontinue metoprolol 100 mg XL daily.  4.  She will start on Coreg 12.5 mg twice a day.  5.  We discussed her echo from 5/10/2019 which shows diastolic dysfunction grade 1 and EF of 60% to 65%.  6.  She is to watch her salt intake, fluid intake, and weigh herself on a daily basis.  7.  Related to diastolic dysfunction, she is to work on increasing her activity as well as weight loss.  8.  She will be seen in approximately 1 month follow-up to reassess her blood pressure.      Dorian Hunter        Physical Exam   Temp: 97.8  F (36.6  C) Temp src: Tympanic BP: (!) 138/92 Pulse: 72   Resp: 18        Vitals:    05/30/19 1019   Weight: 95.3 kg (210 lb)     Vital Signs with Ranges  Temp:  [97.8  F (36.6  C)] 97.8  F (36.6  C)  Pulse:  [72] 72  Resp:  [18] 18  BP: (138)/(92) 138/92  ROS is negative except that which was noted in the HPI.          Constitutional: awake, alert, cooperative, no apparent distress, and appears stated age  Eyes: Lids and lashes normal, pupils equal, sclera clear, conjunctiva normal  ENT: Normocephalic, without obvious abnormality, atraumatic.  Respiratory: No increased work of breathing, good air exchange, clear to auscultation bilaterally, no crackles or wheezing  Cardiovascular: Normal apical impulse, regular rate and rhythm, normal S1 and S2, no S3 or S4, and no murmur noted  Musculoskeletal: no lower  extremity pitting edema present  Neurologic: Awake, alert, oriented to name, place and time.   Neuropsychiatric: General: normal, calm and normal eye contact    Medications         Data   No results found for this or any previous visit (from the past 24 hour(s)).  No results found for this or any previous visit (from the past 24 hour(s)).

## 2019-06-19 NOTE — PROGRESS NOTES
Cardiology Progress Note     Assessment & Plan   Yvonne Llanos is a 53 year old female who is being seen in follow-up to visit from 5/30/2019. She has been followed secondary to uncontrolled hypertension with resistant hypertension and hypertensive urgency with blood pressures in the 200's.    She struggles with control of her blood pressure.  She is currently on hydrochlorothiazide 25 mg daily, losartan 100 mg daily, Coreg 12.5 mg twice a day, and spironolactone 25 mg daily.  Her blood pressure is now controlled.  She complains of fatigue.  It is uncertain if her symptoms are secondary to MS, KARRIE, or medications.    She has been on amlodipine in the past but this has resulted in swelling to her lower extremities which has caused her plantar fasciitis to flare.  She has been on hydrochlorothiazide which controlled her blood pressure well but it has caused her potassium to drop.  More recently, her potassium has been controlled with the addition of spironolactone. She has an automated machine which she does not feel comfortable using and has not purchased a manual blood pressure cuff at this point.  Her blood pressure in the chart has ranged from the 120's to 200's.  Her EKG on 4/29/2019 was concerning for LVH.      She is known to have KARRIE but this has not been treated. She was referred for consideration of KARRIE evaluation on 4/29/2019 with work-up in progress. She is still waiting to hear back in [place] related to this work-up.  She was referred in [place] in 6/20/2019.  We also discussed other secondary causes of hypertension.  She had ultrasound of her kidneys on 5/10/2019 without evidence for renal artery stenosis.  We also checked a renin aldosterone levels.    She has a family history of heart disease with a brother dying in his 30's from heart disease and a father who had heart disease in his 70's.  She herself has not had any chest pain or chest tightness.  She denies any palpitations or racing  heart.  She has not had stenting or bypass.       She had an echo completed on 5/10/2019 that showed diastolic dysfunction grade 1 likely secondary to uncontrolled hypertension/KARRIE, trace MR, trace TR, and mild calcification of the aortic valve.    Impression:  1.  Resistant hypertension.  2.  Benign essential hypertension with history of hypertensive urgency.  3.  Ongoing fatigue.  4.  KARRIE with history of CPAP without usage for 2 years.  She was referred to pulmonary for consideration of sleep study/treatment on 4/29/2019 in [place].  She was referred in [place] on 4/20/2019.  5.  Diastolic dysfunction grade 1 on 5/10/2019.  6.  Dyspnea on exertion.  7.  MS.  8.  Obesity.  9.  CKD-2.  10.  Hyperlipidemia-uncontrolled not taking Lipitor 40 mg which was started on 5/3/2019.  11.  Hypertriglyceridemia.    Plan:  1.  Blood pressure is now controlled.  We will continue 12.5 mg twice a day, hydrochlorothiazide 25 mg daily, losartan 100 mg daily, spironolactone 25 mg daily.  2.  We discussed her echo from 5/10/2019 which shows diastolic dysfunction grade 1 and EF of 60% to 65%.  3.  She is to watch her salt intake, fluid intake, and weigh herself on a daily basis.  4.  Related to diastolic dysfunction, she is to work on increasing her activity as well as weight loss.  5.  She will follow-up in the future on an as-needed basis.      Dorian Hunter        Physical Exam   Temp: 98.2  F (36.8  C) Temp src: Tympanic BP: 110/78 Pulse: 76   Resp: 20        Vitals:    06/20/19 1128   Weight: 96.2 kg (212 lb)     Vital Signs with Ranges  Temp:  [98.2  F (36.8  C)] 98.2  F (36.8  C)  Pulse:  [76] 76  Resp:  [20] 20  BP: (110)/(78) 110/78  ROS is negative except that which was noted in the HPI.          Constitutional: awake, alert, cooperative, no apparent distress, and appears stated age  Eyes: Lids and lashes normal, pupils equal, sclera clear, conjunctiva normal  ENT: Normocephalic, without obvious abnormality,  atraumatic.  Respiratory: No increased work of breathing, good air exchange, clear to auscultation bilaterally, no crackles or wheezing  Cardiovascular: Normal apical impulse, regular rate and rhythm, normal S1 and S2, no S3 or S4, and no murmur noted  Musculoskeletal: no lower extremity pitting edema present  Neurologic: Awake, alert, oriented to name, place and time.   Neuropsychiatric: General: normal, calm and normal eye contact    Medications         Data   No results found for this or any previous visit (from the past 24 hour(s)).  No results found for this or any previous visit (from the past 24 hour(s)).

## 2019-06-20 ENCOUNTER — OFFICE VISIT (OUTPATIENT)
Dept: CARDIOLOGY | Facility: OTHER | Age: 54
End: 2019-06-20
Attending: INTERNAL MEDICINE
Payer: COMMERCIAL

## 2019-06-20 VITALS
BODY MASS INDEX: 34.22 KG/M2 | SYSTOLIC BLOOD PRESSURE: 110 MMHG | HEART RATE: 76 BPM | DIASTOLIC BLOOD PRESSURE: 78 MMHG | WEIGHT: 212 LBS | RESPIRATION RATE: 20 BRPM | TEMPERATURE: 98.2 F

## 2019-06-20 DIAGNOSIS — I51.89 DIASTOLIC DYSFUNCTION: ICD-10-CM

## 2019-06-20 DIAGNOSIS — M79.7 FIBROMYALGIA: ICD-10-CM

## 2019-06-20 DIAGNOSIS — G47.33 OSA (OBSTRUCTIVE SLEEP APNEA): ICD-10-CM

## 2019-06-20 DIAGNOSIS — Z98.84 STATUS POST BARIATRIC SURGERY: ICD-10-CM

## 2019-06-20 DIAGNOSIS — E78.2 MIXED HYPERLIPIDEMIA: ICD-10-CM

## 2019-06-20 DIAGNOSIS — E78.5 HYPERLIPIDEMIA LDL GOAL <130: ICD-10-CM

## 2019-06-20 DIAGNOSIS — E66.9 OBESITY, UNSPECIFIED CLASSIFICATION, UNSPECIFIED OBESITY TYPE, UNSPECIFIED WHETHER SERIOUS COMORBIDITY PRESENT: ICD-10-CM

## 2019-06-20 DIAGNOSIS — R06.00 DYSPNEA, UNSPECIFIED TYPE: ICD-10-CM

## 2019-06-20 DIAGNOSIS — R53.83 OTHER FATIGUE: ICD-10-CM

## 2019-06-20 DIAGNOSIS — I16.0 HYPERTENSIVE URGENCY: ICD-10-CM

## 2019-06-20 DIAGNOSIS — G35 MS (MULTIPLE SCLEROSIS) (H): ICD-10-CM

## 2019-06-20 DIAGNOSIS — I10 ESSENTIAL HYPERTENSION, BENIGN: ICD-10-CM

## 2019-06-20 DIAGNOSIS — I1A.0 RESISTANT HYPERTENSION: Primary | ICD-10-CM

## 2019-06-20 PROCEDURE — 99214 OFFICE O/P EST MOD 30 MIN: CPT | Performed by: INTERNAL MEDICINE

## 2019-06-20 ASSESSMENT — PAIN SCALES - GENERAL: PAINLEVEL: MILD PAIN (2)

## 2019-06-20 NOTE — NURSING NOTE
"Patient comes in for one month follow up on blood pressure.  Yumiko Barrios LPN ....................6/20/2019   11:30 AM  Chief Complaint   Patient presents with     Follow Up     one month       Initial /78 (BP Location: Right arm, Patient Position: Sitting, Cuff Size: Adult Large)   Pulse 76   Temp 98.2  F (36.8  C) (Tympanic)   Resp 20   Wt 96.2 kg (212 lb)   BMI 34.22 kg/m   Estimated body mass index is 34.22 kg/m  as calculated from the following:    Height as of 4/29/19: 1.676 m (5' 6\").    Weight as of this encounter: 96.2 kg (212 lb).  Meds Reconciled: complete  Pt is not on Aspirin  Pt is on a Statin  PHQ and/or DIA reviewed. Pt referred to PCP/MH Provider as appropriate.    Yumiko Barrios LPN      "

## 2019-06-21 DIAGNOSIS — I10 ESSENTIAL HYPERTENSION, BENIGN: ICD-10-CM

## 2019-06-21 RX ORDER — LOSARTAN POTASSIUM 100 MG/1
TABLET ORAL
Qty: 90 TABLET | Refills: 2 | Status: SHIPPED | OUTPATIENT
Start: 2019-06-21 | End: 2020-03-17

## 2019-07-18 ENCOUNTER — TELEPHONE (OUTPATIENT)
Dept: FAMILY MEDICINE | Facility: OTHER | Age: 54
End: 2019-07-18

## 2019-07-18 ENCOUNTER — OFFICE VISIT (OUTPATIENT)
Dept: FAMILY MEDICINE | Facility: OTHER | Age: 54
End: 2019-07-18
Attending: FAMILY MEDICINE
Payer: COMMERCIAL

## 2019-07-18 VITALS
SYSTOLIC BLOOD PRESSURE: 108 MMHG | BODY MASS INDEX: 32.6 KG/M2 | DIASTOLIC BLOOD PRESSURE: 62 MMHG | WEIGHT: 202 LBS | HEART RATE: 61 BPM | OXYGEN SATURATION: 98 %

## 2019-07-18 DIAGNOSIS — I10 ESSENTIAL HYPERTENSION, BENIGN: Primary | ICD-10-CM

## 2019-07-18 DIAGNOSIS — G35 MS (MULTIPLE SCLEROSIS) (H): ICD-10-CM

## 2019-07-18 PROCEDURE — 36415 COLL VENOUS BLD VENIPUNCTURE: CPT | Performed by: FAMILY MEDICINE

## 2019-07-18 PROCEDURE — 99213 OFFICE O/P EST LOW 20 MIN: CPT | Performed by: FAMILY MEDICINE

## 2019-07-18 PROCEDURE — 80048 BASIC METABOLIC PNL TOTAL CA: CPT | Performed by: FAMILY MEDICINE

## 2019-07-18 RX ORDER — DULOXETIN HYDROCHLORIDE 30 MG/1
30 CAPSULE, DELAYED RELEASE ORAL DAILY
COMMUNITY
End: 2019-12-11

## 2019-07-18 ASSESSMENT — PAIN SCALES - GENERAL: PAINLEVEL: NO PAIN (0)

## 2019-07-18 NOTE — TELEPHONE ENCOUNTER
7:35 AM    Reason for Call: OVERBOOK    Patient is having the following symptoms: Follow up - medication (not feeling well since changes) for 2 months. Also about 2 wks ago, pt fainted (just a couple of seconds) in the morning.    The patient is requesting an appointment for overbook for today with Dr. Adame. Pt asked message be sent to providers nurse (not be transferred to Triage)    Was an appointment offered for this call? No  If yes : Appointment type              Date    Preferred method for responding to this message: Telephone Call  What is your phone number ? [phone]    If we cannot reach you directly, may we leave a detailed response at the number you provided? Yes    Can this message wait until your PCP/provider returns, if unavailable today? Not applicable, provider is in today    Cassy Raymundo

## 2019-07-18 NOTE — NURSING NOTE
"Chief Complaint   Patient presents with     Hypertension       Initial /62   Pulse 61   Wt 91.6 kg (202 lb)   SpO2 98%   BMI 32.60 kg/m   Estimated body mass index is 32.6 kg/m  as calculated from the following:    Height as of 4/29/19: 1.676 m (5' 6\").    Weight as of this encounter: 91.6 kg (202 lb).  Medication Reconciliation: complete   Cathy Greene    "

## 2019-07-18 NOTE — PROGRESS NOTES
SUBJECTIVE:   Yvonne Llanos is a 53 year old female who presents to clinic today for the following   health issues:      Blood pressure      Duration: 3 weeks    Description (location/character/radiation): side effects of medication-low BP in the morning and high at night    Intensity:  moderate    Accompanying signs and symptoms: weakness, dizzy in the morning, fatigue, fainted    History (similar episodes/previous evaluation): None    Precipitating or alleviating factors: None    Therapies tried and outcome: None       PROBLEMS TO ADD ON...    Additional history: having low bp in the am and higher in the pm.  Bad headaches since starting cymbalta.      Reviewed  and updated as needed this visit by clinical staff         Reviewed and updated as needed this visit by Provider         Patient Active Problem List   Diagnosis     Hyperlipidemia LDL goal <130     Insulin resistance     Essential hypertension, benign     Rotator cuff tendonitis, left     MS (multiple sclerosis) (H)     Fibromyalgia     Tear film insufficiency     Status post bariatric surgery     Hypertensive urgency     Mixed hyperlipidemia     KARRIE with history of CPAP     Subclinical hypothyroidism     Hx of cardiac murmur     Diastolic dysfunction grade 1 on 5/10/2019     Other fatigue     Obesity     Resistant hypertension     Past Surgical History:   Procedure Laterality Date      x2       CHOLECYSTECTOMY       HYSTERECTOMY TOTAL ABDOMINAL, BILATERAL SALPINGO-OOPHORECTOMY, COMBINED  2001    Endometriosis     LAPAROSCOPIC GASTRIC SLEEVE  2014    [place]     LAPAROSCOPY      D&C, exploratory,  placental tissue adhered to uterus     rotator cuff tendon surgery Left      estrada       Social History     Tobacco Use     Smoking status: Never Smoker     Smokeless tobacco: Never Used   Substance Use Topics     Alcohol use: Yes     Alcohol/week: 0.0 oz     Comment: rarely-3 drinks a year     Family History   Problem Relation Age of  Onset     Hypertension Mother      Heart Failure Mother         Congestive     Other - See Comments Mother         marcos mitchell??  hypermobility     Lipids Father         Hyperlipidemia     Cancer Father         Skin     Hypertension Father      Prostate Cancer Father      Suicide Maternal Grandmother      Diabetes Maternal Grandfather      Clotting Disorder Maternal Grandfather      Diabetes Paternal Grandmother      Kidney Disease Paternal Grandmother      Other - See Comments Paternal Grandfather         old age     Depression Brother      Diabetes Brother         type 2     Myocardial Infarction Brother 32        ETOHic, +tobacco     Peripheral Vascular Disease Brother      Hyperlipidemia Brother      Other - See Comments Sister         Post Partum DVT     Other Cancer Sister         retinal tear     Known Genetic Syndrome Daughter         marcos mitchell     Known Genetic Syndrome Daughter         marcos mitchell         Current Outpatient Medications   Medication Sig Dispense Refill     albuterol (PROAIR HFA/PROVENTIL HFA/VENTOLIN HFA) 108 (90 Base) MCG/ACT inhaler SHAKE WELL AND INHALE 2 PUFFS INTO THE LUNGS EVERY 6 HOURS AS NEEDED FOR SHORTNESS OF BREATH/ DYSPNEA OR WHEEZING 18 g 0     baclofen (LIORESAL) 10 MG tablet Take 10 mg by mouth 4 times daily  3     butalbital-acetaminophen-caffeine (FIORICET/ESGIC) -40 MG per tablet TAKE 1 TABLET BY MOUTH THREE TIMES WEEKLY AS NEEDED FOR HEADACHE 36 tablet 3     carvedilol (COREG) 12.5 MG tablet Take 1 tablet (12.5 mg) by mouth 2 times daily (with meals) 180 tablet 3     cholecalciferol (VITAMIN D3) 5000 UNITS CAPS capsule Take 1 capsule (5,000 Units) by mouth daily  0     diazepam (VALIUM) 5 MG tablet TK 1 T Q 8-12 H PRN FOR MSP  5     DULoxetine (CYMBALTA) 30 MG capsule Take 30 mg by mouth daily       folic acid-vit B6-vit B12 (FOLGARD) 0.8-10-0.115 MG TABS Take 1 tablet by mouth daily       gabapentin (NEURONTIN) 400 MG capsule TAKE ONE CAPSULE BY MOUTH THREE  TIMES DAILY 360 capsule 3     guaiFENesin-codeine (ROBITUSSIN AC) 100-10 MG/5ML solution Take 5-10 mLs by mouth every 4 hours as needed for cough 180 mL 0     hydrochlorothiazide (HYDRODIURIL) 25 MG tablet Take 1 tablet (25 mg) by mouth daily 90 tablet 3     ketotifen (ZADITOR/REFRESH ANTI-ITCH) 0.025 % ophthalmic solution 1 drop 2 times daily       losartan (COZAAR) 100 MG tablet TAKE 1 TABLET BY MOUTH EVERY DAY 90 tablet 2     montelukast (SINGULAIR) 10 MG tablet TAKE 1 TABLET BY MOUTH EVERY EVENING 30 tablet 5     Multiple Vitamins-Minerals (MULTIVITAMIN GUMMIES ADULT) CHEW Take 2 tablets by mouth daily 30 tablet 0     spironolactone (ALDACTONE) 25 MG tablet Take 1 tablet (25 mg) by mouth daily 90 tablet 3     Allergies   Allergen Reactions     Ace Inhibitors Cough     Amitriptyline Other (See Comments) and Nausea     Nightmares     Codeine      Headache and nausea      Flagyl [Metronidazole] Nausea     Morphine Nausea     Promethazine      nausea     Seasonal      Sulfa Drugs        ROS:  Constitutional, HEENT, cardiovascular, pulmonary, gi and gu systems are negative, except as otherwise noted.    OBJECTIVE:                                                    /62   Pulse 61   Wt 91.6 kg (202 lb)   SpO2 98%   BMI 32.60 kg/m    Body mass index is 32.6 kg/m .  GENERAL APPEARANCE: Alert, no acute distress  CV: regular rate and rhythm, no murmur, rub or gallop  RESP: lungs clear to auscultation bilaterally  ABDOMEN: normal bowel sounds, soft, nontender, no hepatosplenomegaly or other masses  SKIN: no suspicious lesions or rashes to visualized skin  NEURO: Alert, oriented x 3, speech and mentation normal           ASSESSMENT/PLAN:                                                    1. MS (multiple sclerosis) (H)  Reviewed.  She recently started cymbalta and it seems to be causing headaches and possibly throwing off her bp.  She is going to work with neurology on that one.  I deferred to them.      2.  Essential hypertension, benign  Reviewed.  Update bmp.  Stable overall numbers with some apparent fluctuations and dizziness.  Going to go with taking the losartan at noon, along with the spironolactone, and keep the timing the same of the others.  F/u if ongoing concerns.    - Basic metabolic panel          Cholo Adame MD  [facility]

## 2019-07-19 LAB
ANION GAP SERPL CALCULATED.3IONS-SCNC: 7 MMOL/L (ref 3–14)
BUN SERPL-MCNC: 18 MG/DL (ref 7–30)
CALCIUM SERPL-MCNC: 9.2 MG/DL (ref 8.5–10.1)
CHLORIDE SERPL-SCNC: 103 MMOL/L (ref 94–109)
CO2 SERPL-SCNC: 31 MMOL/L (ref 20–32)
CREAT SERPL-MCNC: 0.86 MG/DL (ref 0.52–1.04)
GFR SERPL CREATININE-BSD FRML MDRD: 76 ML/MIN/{1.73_M2}
GLUCOSE SERPL-MCNC: 93 MG/DL (ref 70–99)
POTASSIUM SERPL-SCNC: 3.8 MMOL/L (ref 3.4–5.3)
SODIUM SERPL-SCNC: 141 MMOL/L (ref 133–144)

## 2019-08-05 PROBLEM — I16.0 HYPERTENSIVE URGENCY: Status: RESOLVED | Noted: 2019-04-29 | Resolved: 2019-08-05

## 2019-08-05 PROBLEM — E66.9 OBESITY, UNSPECIFIED CLASSIFICATION, UNSPECIFIED OBESITY TYPE, UNSPECIFIED WHETHER SERIOUS COMORBIDITY PRESENT: Chronic | Status: ACTIVE | Noted: 2019-05-30

## 2019-08-05 NOTE — PROGRESS NOTES
Initially seen by Dr. Anton in 2013 for bariatric evaluation with snoring, daytime tiredness, obesity (252#).    Polysomnography 9/12/13 The apnea hypopnea index was 30 events per hour with desats down to 82 %. CPAP 8 cm was effective.    She had a gastric sleeve 2014 and stopped using PAP... but was not restudied     She is sent for consultation by Dr. Hunter for evaluation because of resistant hypertension. Initial sleep questionnaire 8/5/2019 notable for occasional morning headaches.     ESS 5    Sleep schedule  Weekdays 9pm - 430am  Weekends 10pm - 5am    Sleep ROS otherwise unremerakable    SpO2 Readings from Last 3 Encounters:   07/18/19 98%   05/20/19 97%   05/03/19 96%       Recent Labs   Lab Test 07/18/19  1403 05/30/19  1115    141   POTASSIUM 3.8 3.8   CHLORIDE 103 103   CO2 31 30   ANIONGAP 7 8   GLC 93 90   BUN 18 15   CR 0.86 0.81   NIMCO 9.2 9.7       BMI Readings from Last 3 Encounters:   07/18/19 32.60 kg/m    06/20/19 34.22 kg/m    05/30/19 33.89 kg/m        10 point ROS negative with exceptions noted above and below.   Double vision YES  Post-nasal drip YES  Headaches YES  Weakness in arms or legs YES  Coughing up mucous or phlegm YES  Wheezing when breathing YES   Muscle pain YES      Severe obstructive sleep apnea by sleep study 2013, 50# weight loss siince then. Ocasional morning headaches, few other symptoms. Comorbid resistant hypertension, obesity    Recommend Polysomnogram (using 4% desaturation/Medicare/2012 AASM 1B scoring rules) for re-evaluation of obstructive sleep apnea.

## 2019-08-26 DIAGNOSIS — I16.0 HYPERTENSIVE URGENCY: ICD-10-CM

## 2019-08-26 DIAGNOSIS — I51.89 DIASTOLIC DYSFUNCTION: ICD-10-CM

## 2019-08-26 RX ORDER — HYDROCHLOROTHIAZIDE 12.5 MG/1
TABLET ORAL
Qty: 60 TABLET | Refills: 11 | Status: SHIPPED | OUTPATIENT
Start: 2019-08-26 | End: 2021-02-24

## 2019-08-26 NOTE — TELEPHONE ENCOUNTER
Patient called [facility] and left a message.  Patient states was having her Hydrochlorothiazide filled by Dr. Hunter but would now like them filled by Dr. Adame as only seeing Dr. Hunter if issues again.  Taking different than medication list.  Changed at last office visit to Hydrochlorthiazide  12.5 mg take 1 tablet twice daily.  Medication pended if you approve.  Patient using [facility].  Nisha Pool, RN  Care Coordination

## 2019-08-31 DIAGNOSIS — G43.809 OTHER MIGRAINE WITHOUT STATUS MIGRAINOSUS, NOT INTRACTABLE: ICD-10-CM

## 2019-09-03 RX ORDER — BUTALBITAL, ACETAMINOPHEN AND CAFFEINE 50; 325; 40 MG/1; MG/1; MG/1
TABLET ORAL
Qty: 36 TABLET | Refills: 0 | Status: SHIPPED | OUTPATIENT
Start: 2019-09-03 | End: 2019-12-12

## 2019-09-03 NOTE — TELEPHONE ENCOUNTER
butalbital-acetaminophen-caffeine (FIORICET/ESGIC) -40 MG per tablet      Last Written Prescription Date:  7-31-18  Last Fill Quantity: 36,   # refills: 3  Last Office Visit: 7-18-19  Future Office visit:    Next 5 appointments (look out 90 days)    Nov 21, 2019  8:45 AM CST  (Arrive by 8:30 AM)  PHYSICAL with Cholo Adame MD  [facility] ([facility] ) [address] [place] [address]  [phone]           Routing refill request to provider for review/approval because:  Drug not on the [facility], P or  Health refill protocol or controlled substance

## 2019-09-16 ENCOUNTER — THERAPY VISIT (OUTPATIENT)
Dept: SLEEP MEDICINE | Facility: HOSPITAL | Age: 54
End: 2019-09-16
Attending: INTERNAL MEDICINE
Payer: COMMERCIAL

## 2019-09-16 DIAGNOSIS — E66.9 OBESITY, UNSPECIFIED CLASSIFICATION, UNSPECIFIED OBESITY TYPE, UNSPECIFIED WHETHER SERIOUS COMORBIDITY PRESENT: Chronic | ICD-10-CM

## 2019-09-16 DIAGNOSIS — I1A.0 RESISTANT HYPERTENSION: ICD-10-CM

## 2019-09-16 DIAGNOSIS — G47.33 OSA (OBSTRUCTIVE SLEEP APNEA): Chronic | ICD-10-CM

## 2019-09-16 DIAGNOSIS — Z98.84 STATUS POST BARIATRIC SURGERY: Chronic | ICD-10-CM

## 2019-09-16 DIAGNOSIS — I10 ESSENTIAL HYPERTENSION, BENIGN: Chronic | ICD-10-CM

## 2019-09-16 PROCEDURE — 95810 POLYSOM 6/> YRS 4/> PARAM: CPT

## 2019-09-16 PROCEDURE — 95810 POLYSOM 6/> YRS 4/> PARAM: CPT | Mod: 26 | Performed by: INTERNAL MEDICINE

## 2019-09-17 NOTE — NURSING NOTE
STUDY TYPE/INSURANCE:  PSG/BCBS  SLEEP AID TYPE/TIME:  none  PAP ACCLIMATION:  Pt is familiar with CPAP She was compliant with PAP therapy prior to her bariatric surgery  RESPIRATORY EVENTS (BASELINE): <10  SNORING: mostly loud.  TCO2 MONITORING AND ABGS: n/a   TITRATION:CPAP was not applied as she did not appear to meet criteria  LEAK RATE:  n/a  HOB ELEVATION:flat  FINAL MASK TYPE/SIZE:  A small nasal mask fit nicely  SLEEP STAGES:  1,2,3 and REM  ECG: NSR   MOVEMENTS/BEHAVIORS:  Position changes.  CURRENTLY ON TX OR HAVE EQUIPMENT:  none

## 2019-09-19 NOTE — NURSING NOTE
Patient is here with a history of sleep apnea diagnosed in 2013 and has had surgery and 50 lb weight loss. All stages of sleep were noted however there was only 5% REM. She did have very loud snoring with arousals but very few few respiratory events with an index of 0.7 and her SPO2 remained in the 90's except for a very brief dip to 88%.   Patient was not started on CPAP as she did not qualify.  Patient tolerated test well.

## 2019-09-25 NOTE — PROCEDURES
" SLEEP STUDY INTERPRETATION  DIAGNOSTIC POLYSOMNOGRAPHY REPORT      Patient: Yvonne Llanos  MRN: [id number]  YOB: 1965  Study Date: 9/16/2019      Indications for Polysomnography:  Yvonne Llanos is a 53 year old female with history of obstructive sleep apnea .     Estimated body mass index is 32.6 kg/m  as calculated from the following:    Height as of 4/29/19: 1.676 m (5' 6\").    Weight as of 7/18/19: 91.6 kg (202 lb).  Total score - [place]: 5 (8/5/2019  9:00 AM)    Polysomnogram Data: A full night polysomnogram recorded the standard physiologic parameters including EEG, EOG, EMG, ECG, nasal and oral airflow. Respiratory parameters of chest and abdominal movements were recorded with respiratory inductance plethysmography. Oxygen saturation was recorded by pulse oximetry. Hypopnea scoring rule used: 1B 4%.    Sleep Architecture:  The total time in bed of the polysomnogram was 362 minutes. The total sleep time was 326 minutes. Sleep latency was normal at 17 minutes without the use of a sleep aid. REM latency was 178 minutes. Arousal index was 7.9 arousals per hour. Sleep efficiency was normal at 90%. Wake after sleep onset was 10 minutes. The patient spent 1% of total sleep time in Stage N1, 63% in Stage N2, 31% in Stage N3, and 5% in REM.     Respiration:     Events ? The polysomnogram revealed a presence of 1 obstructive, 0 central, and 0 mixed apneas. There were 3 obstructive hypopneas and 0 central hypopneas resulting in a combined apnea/hypopnea index of 0.7 events per hour (central apnea/hypopnea index was 0 events per hour). The REM AHI was 0 events per hour. The supine AHI was n/a events per hour.     Snoring - was reported as loud    Respiratory rate and pattern - was notable for normal respiratory rate and pattern.    Sustained Sleep Associated Hypoventilation - Transcutaneous carbon dioxide monitoring was not used, however significant hypoventilation was not suggested by " oximetry    Sleep Associated Hypoxemia - (Greater than 5 minutes O2 sat at or below 88%) was not present. Baseline oxygen saturation was 95%. Lowest oxygen saturation was 88% and 0.6% of time was spent less than or equal to 89%.    Movement Activity:     Periodic Limb Activity - There were 0 PLMs during the entire study.     REM EMG Activity - Excessive transient/sustained muscle activity was not present.    Nocturnal Behavior - Abnormal sleep related behaviors were not noted     Bruxism - None apparent.    Cardiac Summary:Arrhythmias were not noted.        Assessment:     Np evidence of obstructive sleep apnea in the lateral position    Sensitivity of study limited by the paucity of REM sleep and lack of supine sleep    Recommendations:    Consider repeat polysomnography with attention to supine sleep        Diagnostic Codes:   Snoring      _____________________________________   Electronically Signed By: Sumeet Angulo MD September 25, 2019

## 2019-10-01 PROBLEM — I51.89 DIASTOLIC DYSFUNCTION: Chronic | Status: ACTIVE | Noted: 2019-05-20

## 2019-10-01 PROBLEM — G35 MS (MULTIPLE SCLEROSIS) (H): Chronic | Status: ACTIVE | Noted: 2017-05-04

## 2019-12-11 ENCOUNTER — OFFICE VISIT (OUTPATIENT)
Dept: FAMILY MEDICINE | Facility: OTHER | Age: 54
End: 2019-12-11
Attending: FAMILY MEDICINE
Payer: COMMERCIAL

## 2019-12-11 VITALS
WEIGHT: 202 LBS | BODY MASS INDEX: 33.66 KG/M2 | DIASTOLIC BLOOD PRESSURE: 60 MMHG | SYSTOLIC BLOOD PRESSURE: 98 MMHG | HEART RATE: 45 BPM | OXYGEN SATURATION: 98 % | HEIGHT: 65 IN

## 2019-12-11 DIAGNOSIS — Z11.4 SCREENING FOR HIV (HUMAN IMMUNODEFICIENCY VIRUS): ICD-10-CM

## 2019-12-11 DIAGNOSIS — Z00.00 ROUTINE GENERAL MEDICAL EXAMINATION AT A HEALTH CARE FACILITY: ICD-10-CM

## 2019-12-11 DIAGNOSIS — G35 MS (MULTIPLE SCLEROSIS) (H): Chronic | ICD-10-CM

## 2019-12-11 DIAGNOSIS — E78.5 HYPERLIPIDEMIA LDL GOAL <130: Primary | Chronic | ICD-10-CM

## 2019-12-11 DIAGNOSIS — I1A.0 RESISTANT HYPERTENSION: ICD-10-CM

## 2019-12-11 DIAGNOSIS — I51.89 DIASTOLIC DYSFUNCTION: Chronic | ICD-10-CM

## 2019-12-11 LAB
ALBUMIN SERPL-MCNC: 3.8 G/DL (ref 3.4–5)
ALP SERPL-CCNC: 92 U/L (ref 40–150)
ALT SERPL W P-5'-P-CCNC: 21 U/L (ref 0–50)
ANION GAP SERPL CALCULATED.3IONS-SCNC: 6 MMOL/L (ref 3–14)
AST SERPL W P-5'-P-CCNC: 13 U/L (ref 0–45)
BILIRUB SERPL-MCNC: 0.3 MG/DL (ref 0.2–1.3)
BUN SERPL-MCNC: 17 MG/DL (ref 7–30)
CALCIUM SERPL-MCNC: 9.4 MG/DL (ref 8.5–10.1)
CHLORIDE SERPL-SCNC: 102 MMOL/L (ref 94–109)
CHOLEST SERPL-MCNC: 222 MG/DL
CO2 SERPL-SCNC: 30 MMOL/L (ref 20–32)
CREAT SERPL-MCNC: 0.72 MG/DL (ref 0.52–1.04)
GFR SERPL CREATININE-BSD FRML MDRD: >90 ML/MIN/{1.73_M2}
GLUCOSE SERPL-MCNC: 85 MG/DL (ref 70–99)
HDLC SERPL-MCNC: 58 MG/DL
LDLC SERPL CALC-MCNC: 128 MG/DL
NONHDLC SERPL-MCNC: 164 MG/DL
POTASSIUM SERPL-SCNC: 3.7 MMOL/L (ref 3.4–5.3)
PROT SERPL-MCNC: 7.3 G/DL (ref 6.8–8.8)
SODIUM SERPL-SCNC: 138 MMOL/L (ref 133–144)
TRIGL SERPL-MCNC: 178 MG/DL

## 2019-12-11 PROCEDURE — 90682 RIV4 VACC RECOMBINANT DNA IM: CPT | Performed by: FAMILY MEDICINE

## 2019-12-11 PROCEDURE — 90471 IMMUNIZATION ADMIN: CPT | Performed by: FAMILY MEDICINE

## 2019-12-11 PROCEDURE — 99396 PREV VISIT EST AGE 40-64: CPT | Mod: 25 | Performed by: FAMILY MEDICINE

## 2019-12-11 PROCEDURE — 36415 COLL VENOUS BLD VENIPUNCTURE: CPT | Performed by: FAMILY MEDICINE

## 2019-12-11 PROCEDURE — 80061 LIPID PANEL: CPT | Performed by: FAMILY MEDICINE

## 2019-12-11 PROCEDURE — 87389 HIV-1 AG W/HIV-1&-2 AB AG IA: CPT | Performed by: FAMILY MEDICINE

## 2019-12-11 PROCEDURE — 80053 COMPREHEN METABOLIC PANEL: CPT | Performed by: FAMILY MEDICINE

## 2019-12-11 PROCEDURE — 82306 VITAMIN D 25 HYDROXY: CPT | Performed by: FAMILY MEDICINE

## 2019-12-11 ASSESSMENT — PAIN SCALES - GENERAL: PAINLEVEL: NO PAIN (0)

## 2019-12-11 ASSESSMENT — MIFFLIN-ST. JEOR: SCORE: 1509.21

## 2019-12-11 NOTE — NURSING NOTE
"Chief Complaint   Patient presents with     Physical       Initial BP 98/60   Pulse (!) 45   Ht 1.638 m (5' 4.5\")   Wt 91.6 kg (202 lb)   SpO2 98%   BMI 34.14 kg/m   Estimated body mass index is 34.14 kg/m  as calculated from the following:    Height as of this encounter: 1.638 m (5' 4.5\").    Weight as of this encounter: 91.6 kg (202 lb).  Medication Reconciliation: complete  Cathy Greene LPN  "

## 2019-12-11 NOTE — PROGRESS NOTES
SUBJECTIVE:   CC: Yvonne Llanos is an 54 year old woman who presents for preventive health visit.     Healthy Habits:    Do you get at least three servings of calcium containing foods daily (dairy, green leafy vegetables, etc.)? no, taking calcium and/or vitamin D supplement: no    Amount of exercise or daily activities, outside of work: 3-4 day(s) per week    Problems taking medications regularly No    Medication side effects: No    Have you had an eye exam in the past two years? yes    Do you see a dentist twice per year? yes    Do you have sleep apnea, excessive snoring or daytime drowsiness?no        Today's PHQ-2 Score:   PHQ-2 ( 1999 [employer]) 6/20/2019 5/30/2019   Q1: Little interest or pleasure in doing things 0 0   Q2: Feeling down, depressed or hopeless 0 0   PHQ-2 Score 0 0   Q1: Little interest or pleasure in doing things - -   Q2: Feeling down, depressed or hopeless - -   PHQ-2 Score - -       Abuse: Current or Past(Physical, Sexual or Emotional)- No  Do you feel safe in your environment? Yes        Social History     Tobacco Use     Smoking status: Never Smoker     Smokeless tobacco: Never Used   Substance Use Topics     Alcohol use: Yes     Alcohol/week: 0.0 standard drinks     Frequency: Monthly or less     Comment: rarely-3 drinks a year     If you drink alcohol do you typically have >3 drinks per day or >7 drinks per week? No                     Reviewed orders with patient.  Reviewed health maintenance and updated orders accordingly - Yes  Lab work is in process    Mammogram Screening: Patient over age 50, mutual decision to screen reflected in health maintenance.    Pertinent mammograms are reviewed under the imaging tab.  History of abnormal Pap smear: Status post benign hysterectomy. Health Maintenance and Surgical History updated.     Reviewed and updated as needed this visit by clinical staff  Tobacco  Allergies  Meds         Reviewed and updated as needed this visit by Provider       "  Past Medical History:   Diagnosis Date     Endometriosis      Hypertensive urgency 2019     Other abnormal glucose 2011     Pancreatitis due to biliary obstruction     improved after cholecystectomy      Past Surgical History:   Procedure Laterality Date      SECTION        SECTION       CHOLECYSTECTOMY       HYSTERECTOMY TOTAL ABDOMINAL, BILATERAL SALPINGO-OOPHORECTOMY, COMBINED  2001    Endometriosis     LAPAROSCOPIC GASTRIC SLEEVE  2014    [place]     LAPAROSCOPY      D&C, exploratory,  placental tissue adhered to uterus     ORTHOPEDIC SURGERY  2015    rotator cuff tendon surgery Left        ROS:  CONSTITUTIONAL: NEGATIVE for fever, chills, change in weight  INTEGUMENTARU/SKIN: NEGATIVE for worrisome rashes, moles or lesions  EYES: NEGATIVE for vision changes or irritation  ENT: NEGATIVE for ear, mouth and throat problems  RESP: NEGATIVE for significant cough or SOB  BREAST: NEGATIVE for masses, tenderness or discharge  CV: NEGATIVE for chest pain, palpitations or peripheral edema  GI: NEGATIVE for nausea, abdominal pain, heartburn, or change in bowel habits  : NEGATIVE for unusual urinary or vaginal symptoms. Periods are regular.  MUSCULOSKELETAL: NEGATIVE for significant arthralgias or myalgia  NEURO: NEGATIVE for weakness, dizziness or paresthesias  PSYCHIATRIC: NEGATIVE for changes in mood or affect    OBJECTIVE:   BP 98/60   Pulse (!) 45   Ht 1.638 m (5' 4.5\")   Wt 91.6 kg (202 lb)   SpO2 98%   BMI 34.14 kg/m    EXAM:  GENERAL APPEARANCE: healthy, alert and no distress  EYES: Eyes grossly normal to inspection, PERRL and conjunctivae and sclerae normal  HENT: ear canals and TM's normal, nose and mouth without ulcers or lesions, oropharynx clear and oral mucous membranes moist  NECK: no adenopathy, no asymmetry, masses, or scars and thyroid normal to palpation  RESP: lungs clear to auscultation - no rales, rhonchi or wheezes  CV: regular rate and " "rhythm, normal S1 S2, no S3 or S4, no murmur, click or rub, no peripheral edema and peripheral pulses strong  ABDOMEN: soft, nontender, no hepatosplenomegaly, no masses and bowel sounds normal  MS: no musculoskeletal defects are noted and gait is age appropriate without ataxia  SKIN: no suspicious lesions or rashes  NEURO: Normal strength and tone, sensory exam grossly normal, mentation intact and speech normal  PSYCH: mentation appears normal and affect normal/bright    Diagnostic Test Results:  Labs reviewed in Epic    ASSESSMENT/PLAN:       ICD-10-CM    1. Hyperlipidemia LDL goal <130 E78.5 Comprehensive metabolic panel     Lipid Profile   2. Screening for HIV (human immunodeficiency virus) Z11.4 HIV Antigen Antibody Combo   3. Routine general medical examination at a health care facility Z00.00    4. MS (multiple sclerosis) (H) G35 Vitamin D Deficiency   5. Diastolic dysfunction grade 1 on 5/10/2019 I51.89    6. Resistant hypertension I10        COUNSELING:   Reviewed preventive health counseling, as reflected in patient instructions    Estimated body mass index is 34.14 kg/m  as calculated from the following:    Height as of this encounter: 1.638 m (5' 4.5\").    Weight as of this encounter: 91.6 kg (202 lb).         reports that she has never smoked. She has never used smokeless tobacco.      Counseling Resources:  ATP IV Guidelines  Pooled Cohorts Equation Calculator  Breast Cancer Risk Calculator  FRAX Risk Assessment  ICSI Preventive Guidelines  Dietary Guidelines for Americans, 2010  USDA's MyPlate  ASA Prophylaxis  Lung CA Screening    Cholo Adame MD  [facility]  "

## 2019-12-12 DIAGNOSIS — G43.809 OTHER MIGRAINE WITHOUT STATUS MIGRAINOSUS, NOT INTRACTABLE: ICD-10-CM

## 2019-12-12 RX ORDER — BUTALBITAL, ACETAMINOPHEN AND CAFFEINE 50; 325; 40 MG/1; MG/1; MG/1
TABLET ORAL
Qty: 36 TABLET | Refills: 0 | Status: SHIPPED | OUTPATIENT
Start: 2019-12-12 | End: 2020-03-17

## 2019-12-12 NOTE — TELEPHONE ENCOUNTER
butalbital-acetaminophen-caffeine (FIORICET/ESGIC) -40 MG tablet      Last Written Prescription Date:  9/3/19  Last Fill Quantity: 36,   # refills: 0  Last Office Visit: 12/11/19  Future Office visit:       Routing refill request to provider for review/approval because:  Drug not on the [facility], [facility] or [facility] refill protocol or controlled substance

## 2019-12-13 LAB
DEPRECATED CALCIDIOL+CALCIFEROL SERPL-MC: 80 UG/L (ref 20–75)
HIV 1+2 AB+HIV1 P24 AG SERPL QL IA: NONREACTIVE

## 2019-12-18 ENCOUNTER — OFFICE VISIT (OUTPATIENT)
Dept: FAMILY MEDICINE | Facility: OTHER | Age: 54
End: 2019-12-18
Attending: FAMILY MEDICINE
Payer: COMMERCIAL

## 2019-12-18 VITALS
SYSTOLIC BLOOD PRESSURE: 120 MMHG | HEART RATE: 56 BPM | BODY MASS INDEX: 33.46 KG/M2 | TEMPERATURE: 98.3 F | DIASTOLIC BLOOD PRESSURE: 82 MMHG | OXYGEN SATURATION: 96 % | WEIGHT: 198 LBS

## 2019-12-18 DIAGNOSIS — J01.00 ACUTE NON-RECURRENT MAXILLARY SINUSITIS: Primary | ICD-10-CM

## 2019-12-18 DIAGNOSIS — J40 BRONCHITIS: ICD-10-CM

## 2019-12-18 PROCEDURE — 99213 OFFICE O/P EST LOW 20 MIN: CPT | Performed by: FAMILY MEDICINE

## 2019-12-18 RX ORDER — CODEINE PHOSPHATE AND GUAIFENESIN 10; 100 MG/5ML; MG/5ML
1-2 SOLUTION ORAL EVERY 4 HOURS PRN
Qty: 180 ML | Refills: 0 | Status: SHIPPED | OUTPATIENT
Start: 2019-12-18 | End: 2021-02-24

## 2019-12-18 ASSESSMENT — PAIN SCALES - GENERAL: PAINLEVEL: MILD PAIN (3)

## 2019-12-18 NOTE — NURSING NOTE
"Chief Complaint   Patient presents with     URI       Initial /82   Pulse 56   Temp 98.3  F (36.8  C) (Tympanic)   Wt 89.8 kg (198 lb)   SpO2 96%   BMI 33.46 kg/m   Estimated body mass index is 33.46 kg/m  as calculated from the following:    Height as of 12/11/19: 1.638 m (5' 4.5\").    Weight as of this encounter: 89.8 kg (198 lb).  Medication Reconciliation: complete  Stefani Gaffney MA    "

## 2019-12-18 NOTE — PROGRESS NOTES
Subjective     Yvonne Llanos is a 54 year old female who presents to clinic today for the following health issues:    HPI   RESPIRATORY SYMPTOMS      Duration: over 3 weeks    Description  nasal congestion, rhinorrhea, sore throat, cough, wheezing, ear pain bilateral, fatigue/malaise, myalgias and conjunctival irritation    Severity: moderate    Accompanying signs and symptoms: cough until vomiting    History (predisposing factors):  none    Precipitating or alleviating factors: None    Therapies tried and outcome:  guaifenesin    PROBLEMS TO ADD ON...    Patient Active Problem List   Diagnosis     Hyperlipidemia LDL goal <130     Insulin resistance     Essential hypertension, benign     Rotator cuff tendonitis, left     MS (multiple sclerosis) (H)     Fibromyalgia     Tear film insufficiency     Status post bariatric surgery     Subclinical hypothyroidism     Hx of cardiac murmur     Diastolic dysfunction grade 1 on 5/10/2019     Other fatigue     Obesity     Resistant hypertension     KARRIE (obstructive sleep apnea)- severe (AHI 30)     Migraines     Seasonal allergies     Past Surgical History:   Procedure Laterality Date      SECTION        SECTION       CHOLECYSTECTOMY       HYSTERECTOMY TOTAL ABDOMINAL, BILATERAL SALPINGO-OOPHORECTOMY, COMBINED  2001    Endometriosis     LAPAROSCOPIC GASTRIC SLEEVE  2014    [place]     LAPAROSCOPY      D&C, exploratory,  placental tissue adhered to uterus     ORTHOPEDIC SURGERY  2015    rotator cuff tendon surgery Left        Social History     Tobacco Use     Smoking status: Never Smoker     Smokeless tobacco: Never Used   Substance Use Topics     Alcohol use: Yes     Alcohol/week: 0.0 standard drinks     Frequency: Monthly or less     Comment: rarely-3 drinks a year     Family History   Problem Relation Age of Onset     Hypertension Mother      Heart Failure Mother         Congestive     Other - See Comments Mother         marcos  paula??  hypermobility     Lipids Father         Hyperlipidemia     Cancer Father         Skin     Hypertension Father      Prostate Cancer Father      Suicide Maternal Grandmother      Diabetes Maternal Grandfather      Clotting Disorder Maternal Grandfather      Diabetes Paternal Grandmother      Kidney Disease Paternal Grandmother      Other - See Comments Paternal Grandfather         old age     Depression Brother      Diabetes Brother         type 2     Myocardial Infarction Brother 32        ETOHic, +tobacco     Peripheral Vascular Disease Brother      Hyperlipidemia Brother      Other - See Comments Sister         Post Partum DVT     Other Cancer Sister         retinal tear     Known Genetic Syndrome Daughter         marcos mitchell     Known Genetic Syndrome Daughter         marcos mitchell         Current Outpatient Medications   Medication Sig Dispense Refill     albuterol (PROAIR HFA/PROVENTIL HFA/VENTOLIN HFA) 108 (90 Base) MCG/ACT inhaler SHAKE WELL AND INHALE 2 PUFFS INTO THE LUNGS EVERY 6 HOURS AS NEEDED FOR SHORTNESS OF BREATH/ DYSPNEA OR WHEEZING 18 g 0     amoxicillin-clavulanate (AUGMENTIN) 875-125 MG tablet Take 1 tablet by mouth 2 times daily for 10 days 20 tablet 0     baclofen (LIORESAL) 10 MG tablet Take 10 mg by mouth 4 times daily  3     butalbital-acetaminophen-caffeine (FIORICET/ESGIC) -40 MG tablet TAKE 1 TABLET BY MOUTH THREE TIMES WEEKLY AS NEEDED FOR HEADACHES 36 tablet 0     carvedilol (COREG) 12.5 MG tablet Take 1 tablet (12.5 mg) by mouth 2 times daily (with meals) 180 tablet 3     cholecalciferol (VITAMIN D3) 5000 UNITS CAPS capsule Take 1 capsule (5,000 Units) by mouth daily  0     diazepam (VALIUM) 5 MG tablet TK 1 T Q 8-12 H PRN FOR MSP  5     folic acid-vit B6-vit B12 (FOLGARD) 0.8-10-0.115 MG TABS Take 1 tablet by mouth daily       gabapentin (NEURONTIN) 400 MG capsule TAKE ONE CAPSULE BY MOUTH THREE TIMES DAILY (Patient taking differently: Take 400 mg by mouth 4 times  daily ) 360 capsule 3     guaiFENesin-codeine (ROBITUSSIN AC) 100-10 MG/5ML solution Take 5-10 mLs by mouth every 4 hours as needed for cough 180 mL 0     hydrochlorothiazide (HYDRODIURIL) 12.5 MG tablet Take 1 tablet (12.5 mg) twice daily. 60 tablet 11     ketotifen (ZADITOR/REFRESH ANTI-ITCH) 0.025 % ophthalmic solution Place 1 drop into both eyes 2 times daily       losartan (COZAAR) 100 MG tablet TAKE 1 TABLET BY MOUTH EVERY DAY 90 tablet 2     montelukast (SINGULAIR) 10 MG tablet TAKE 1 TABLET BY MOUTH EVERY EVENING 30 tablet 5     Multiple Vitamins-Minerals (MULTIVITAMIN GUMMIES ADULT) CHEW Take 2 tablets by mouth daily 30 tablet 0     spironolactone (ALDACTONE) 25 MG tablet Take 1 tablet (25 mg) by mouth daily 90 tablet 3     Allergies   Allergen Reactions     Ace Inhibitors Cough     Amitriptyline Other (See Comments) and Nausea     Nightmares     Codeine      Headache and nausea      Flagyl [Metronidazole] Nausea     Morphine Nausea     Promethazine      nausea     Seasonal      Sulfa Drugs        PROBLEMS TO ADD ON...  Reviewed and updated as needed this visit by Provider         Review of Systems   ROS COMP: Constitutional, HEENT, cardiovascular, pulmonary, gi and gu systems are negative, except as otherwise noted.      Objective    There were no vitals taken for this visit.  There is no height or weight on file to calculate BMI.  Physical Exam   GENERAL: healthy, alert and no distress  EYES: Eyes grossly normal to inspection, PERRL and conjunctivae and sclerae normal  HENT: ear canals and TM's normal, nose and mouth without ulcers or lesions  NECK: no adenopathy, no asymmetry, masses, or scars and thyroid normal to palpation  RESP: lungs clear to auscultation - no rales, rhonchi or wheezes  CV: regular rate and rhythm, normal S1 S2, no S3 or S4, no murmur, click or rub, no peripheral edema and peripheral pulses strong  ABDOMEN: soft, nontender, no hepatosplenomegaly, no masses and bowel sounds  "normal  MS: no gross musculoskeletal defects noted, no edema    Diagnostic Test Results:  Labs reviewed in Epic        Assessment & Plan       ICD-10-CM    1. Acute non-recurrent maxillary sinusitis J01.00 amoxicillin-clavulanate (AUGMENTIN) 875-125 MG tablet   2. Bronchitis J40 guaiFENesin-codeine (ROBITUSSIN AC) 100-10 MG/5ML solution      reviewed.  Ongoing URI now with purulence and worsening.  Suggestive of sinusitis.  Augmentin.  Robitussin AC which she tolerates.  F/u routine.      BMI:   Estimated body mass index is 33.46 kg/m  as calculated from the following:    Height as of 12/11/19: 1.638 m (5' 4.5\").    Weight as of this encounter: 89.8 kg (198 lb).               No follow-ups on file.    Cholo Adame MD  [facility]      "

## 2020-01-03 ENCOUNTER — TRANSFERRED RECORDS (OUTPATIENT)
Dept: HEALTH INFORMATION MANAGEMENT | Facility: CLINIC | Age: 55
End: 2020-01-03

## 2020-01-03 LAB — COLOGUARD-ABSTRACT: NEGATIVE

## 2020-01-06 ENCOUNTER — OFFICE VISIT (OUTPATIENT)
Dept: FAMILY MEDICINE | Facility: OTHER | Age: 55
End: 2020-01-06
Attending: FAMILY MEDICINE
Payer: COMMERCIAL

## 2020-01-06 VITALS
SYSTOLIC BLOOD PRESSURE: 94 MMHG | BODY MASS INDEX: 33.63 KG/M2 | WEIGHT: 199 LBS | HEART RATE: 63 BPM | TEMPERATURE: 99.4 F | OXYGEN SATURATION: 96 % | DIASTOLIC BLOOD PRESSURE: 60 MMHG

## 2020-01-06 DIAGNOSIS — J32.0 MAXILLARY SINUSITIS, UNSPECIFIED CHRONICITY: Primary | ICD-10-CM

## 2020-01-06 PROCEDURE — 99213 OFFICE O/P EST LOW 20 MIN: CPT | Performed by: FAMILY MEDICINE

## 2020-01-06 RX ORDER — LEVOFLOXACIN 500 MG/1
500 TABLET, FILM COATED ORAL DAILY
Qty: 7 TABLET | Refills: 0 | Status: SHIPPED | OUTPATIENT
Start: 2020-01-06 | End: 2020-02-19

## 2020-01-06 ASSESSMENT — PAIN SCALES - GENERAL: PAINLEVEL: MILD PAIN (3)

## 2020-01-06 NOTE — PROGRESS NOTES
Subjective     Yvonne Llanos is a 54 year old female who presents to clinic today for the following health issues:    HPI   RESPIRATORY SYMPTOMS      Duration: over 7 weeks    Description  nasal congestion, rhinorrhea, facial pain/pressure, cough, ear pain bilateral, headache, fatigue/malaise, myalgias and conjunctival irritation. Vomiting everyday from coughing    Severity: moderate    Accompanying signs and symptoms: ongoing     History (predisposing factors):  none    Precipitating or alleviating factors: None    Therapies tried and outcome:  amox and robitussin    PROBLEMS TO ADD ON...    Patient Active Problem List   Diagnosis     Hyperlipidemia LDL goal <130     Insulin resistance     Essential hypertension, benign     Rotator cuff tendonitis, left     MS (multiple sclerosis) (H)     Fibromyalgia     Tear film insufficiency     Status post bariatric surgery     Subclinical hypothyroidism     Hx of cardiac murmur     Diastolic dysfunction grade 1 on 5/10/2019     Other fatigue     Obesity     Resistant hypertension     KARRIE (obstructive sleep apnea)- severe (AHI 30)     Migraines     Seasonal allergies     Past Surgical History:   Procedure Laterality Date      SECTION        SECTION       CHOLECYSTECTOMY       HYSTERECTOMY TOTAL ABDOMINAL, BILATERAL SALPINGO-OOPHORECTOMY, COMBINED  2001    Endometriosis     LAPAROSCOPIC GASTRIC SLEEVE  2014    [place]     LAPAROSCOPY      D&C, exploratory,  placental tissue adhered to uterus     ORTHOPEDIC SURGERY  2015    rotator cuff tendon surgery Left        Social History     Tobacco Use     Smoking status: Never Smoker     Smokeless tobacco: Never Used   Substance Use Topics     Alcohol use: Yes     Alcohol/week: 0.0 standard drinks     Frequency: Monthly or less     Comment: rarely-3 drinks a year     Family History   Problem Relation Age of Onset     Hypertension Mother      Heart Failure Mother         Congestive     Other - See  Comments Mother         marcos mitchell??  hypermobility     Lipids Father         Hyperlipidemia     Cancer Father         Skin     Hypertension Father      Prostate Cancer Father      Suicide Maternal Grandmother      Diabetes Maternal Grandfather      Clotting Disorder Maternal Grandfather      Diabetes Paternal Grandmother      Kidney Disease Paternal Grandmother      Other - See Comments Paternal Grandfather         old age     Depression Brother      Diabetes Brother         type 2     Myocardial Infarction Brother 32        ETOHic, +tobacco     Peripheral Vascular Disease Brother      Hyperlipidemia Brother      Other - See Comments Sister         Post Partum DVT     Other Cancer Sister         retinal tear     Known Genetic Syndrome Daughter         marcos mitchell     Known Genetic Syndrome Daughter         marcos mitchell         Current Outpatient Medications   Medication Sig Dispense Refill     albuterol (PROAIR HFA/PROVENTIL HFA/VENTOLIN HFA) 108 (90 Base) MCG/ACT inhaler SHAKE WELL AND INHALE 2 PUFFS INTO THE LUNGS EVERY 6 HOURS AS NEEDED FOR SHORTNESS OF BREATH/ DYSPNEA OR WHEEZING 18 g 0     baclofen (LIORESAL) 10 MG tablet Take 10 mg by mouth 4 times daily  3     butalbital-acetaminophen-caffeine (FIORICET/ESGIC) -40 MG tablet TAKE 1 TABLET BY MOUTH THREE TIMES WEEKLY AS NEEDED FOR HEADACHES 36 tablet 0     carvedilol (COREG) 12.5 MG tablet Take 1 tablet (12.5 mg) by mouth 2 times daily (with meals) 180 tablet 3     cholecalciferol (VITAMIN D3) 5000 UNITS CAPS capsule Take 1 capsule (5,000 Units) by mouth daily  0     diazepam (VALIUM) 5 MG tablet TK 1 T Q 8-12 H PRN FOR MSP  5     folic acid-vit B6-vit B12 (FOLGARD) 0.8-10-0.115 MG TABS Take 1 tablet by mouth daily       gabapentin (NEURONTIN) 400 MG capsule TAKE ONE CAPSULE BY MOUTH THREE TIMES DAILY (Patient taking differently: Take 400 mg by mouth 4 times daily ) 360 capsule 3     guaiFENesin-codeine (ROBITUSSIN AC) 100-10 MG/5ML solution Take  5-10 mLs by mouth every 4 hours as needed for cough 180 mL 0     hydrochlorothiazide (HYDRODIURIL) 12.5 MG tablet Take 1 tablet (12.5 mg) twice daily. 60 tablet 11     ketotifen (ZADITOR/REFRESH ANTI-ITCH) 0.025 % ophthalmic solution Place 1 drop into both eyes 2 times daily       levofloxacin (LEVAQUIN) 500 MG tablet Take 1 tablet (500 mg) by mouth daily for 7 doses 7 tablet 0     losartan (COZAAR) 100 MG tablet TAKE 1 TABLET BY MOUTH EVERY DAY 90 tablet 2     montelukast (SINGULAIR) 10 MG tablet TAKE 1 TABLET BY MOUTH EVERY EVENING 30 tablet 5     Multiple Vitamins-Minerals (MULTIVITAMIN GUMMIES ADULT) CHEW Take 2 tablets by mouth daily 30 tablet 0     spironolactone (ALDACTONE) 25 MG tablet Take 1 tablet (25 mg) by mouth daily 90 tablet 3     Allergies   Allergen Reactions     Ace Inhibitors Cough     Amitriptyline Other (See Comments) and Nausea     Nightmares     Codeine      Headache and nausea      Flagyl [Metronidazole] Nausea     Morphine Nausea     Promethazine      nausea     Seasonal      Sulfa Drugs        PROBLEMS TO ADD ON...  Reviewed and updated as needed this visit by Provider         Review of Systems   ROS COMP: Constitutional, HEENT, cardiovascular, pulmonary, gi and gu systems are negative, except as otherwise noted.      Objective    There were no vitals taken for this visit.  There is no height or weight on file to calculate BMI.  Physical Exam   GENERAL: healthy, alert and no distress  EYES: Eyes grossly normal to inspection, PERRL and conjunctivae and sclerae normal  HENT: ear canals and TM's normal, nose and mouth without ulcers or lesions  NECK: no adenopathy, no asymmetry, masses, or scars and thyroid normal to palpation  RESP: lungs clear to auscultation - no rales, rhonchi or wheezes  CV: regular rate and rhythm, normal S1 S2, no S3 or S4, no murmur, click or rub, no peripheral edema and peripheral pulses strong  ABDOMEN: soft, nontender, no hepatosplenomegaly, no masses and bowel  "sounds normal  MS: no gross musculoskeletal defects noted, no edema    Diagnostic Test Results:  Labs reviewed in Epic        Assessment & Plan       ICD-10-CM    1. Maxillary sinusitis, unspecified chronicity J32.0 levofloxacin (LEVAQUIN) 500 MG tablet      reviewed.  Failed augmentin.  Start levaquin.  Warned about SE including tendon rupture.  F/u with ongoing concerns.  Would need CT and possibly ENT if ongoing despite these abx rounds.      BMI:   Estimated body mass index is 33.63 kg/m  as calculated from the following:    Height as of 12/11/19: 1.638 m (5' 4.5\").    Weight as of this encounter: 90.3 kg (199 lb).               No follow-ups on file.    Cholo Adame MD  [facility]      "

## 2020-01-06 NOTE — NURSING NOTE
"Chief Complaint   Patient presents with     URI       Initial BP 94/60   Pulse 63   Temp 99.4  F (37.4  C) (Tympanic)   Wt 90.3 kg (199 lb)   SpO2 96%   BMI 33.63 kg/m   Estimated body mass index is 33.63 kg/m  as calculated from the following:    Height as of 12/11/19: 1.638 m (5' 4.5\").    Weight as of this encounter: 90.3 kg (199 lb).  Medication Reconciliation: complete  Stefani Gaffney MA    "

## 2020-01-13 ENCOUNTER — TELEPHONE (OUTPATIENT)
Dept: FAMILY MEDICINE | Facility: OTHER | Age: 55
End: 2020-01-13

## 2020-01-13 NOTE — TELEPHONE ENCOUNTER
Left message for pt to call back to notify her of negative cologuard results. Paperwork sent to scanning. Dolores Main LPN

## 2020-02-18 ENCOUNTER — TELEPHONE (OUTPATIENT)
Dept: FAMILY MEDICINE | Facility: OTHER | Age: 55
End: 2020-02-18

## 2020-02-18 RX ORDER — LEVETIRACETAM 250 MG/1
TABLET ORAL
COMMUNITY
Start: 2020-01-27 | End: 2020-02-19

## 2020-02-18 RX ORDER — HYDROCHLOROTHIAZIDE 12.5 MG/1
CAPSULE ORAL
COMMUNITY
Start: 2020-02-05 | End: 2020-02-19

## 2020-02-18 NOTE — PROGRESS NOTES
Subjective     Yvonne Llanos is a 54 year old female who presents to clinic today for the following health issues:    HPI   Musculoskeletal problem/pain      Duration: 3 weeks    Description  Location: left knee    Intensity:  moderate    Accompanying signs and symptoms: swelling    History  Previous similar problem: no   Previous evaluation:  none    Precipitating or alleviating factors:  Trauma or overuse: YES- fell 3 times in the last week  Aggravating factors include: climbing stairs and and squatting    Therapies tried and outcome: rest/inactivity, ice and Ibuprofen        Patient Active Problem List   Diagnosis     Hyperlipidemia LDL goal <130     Insulin resistance     Essential hypertension, benign     Rotator cuff tendonitis, left     MS (multiple sclerosis) (H)     Fibromyalgia     Tear film insufficiency     Status post bariatric surgery     Subclinical hypothyroidism     Hx of cardiac murmur     Diastolic dysfunction grade 1 on 5/10/2019     Other fatigue     Obesity     Resistant hypertension     KARRIE (obstructive sleep apnea)- severe (AHI 30)     Migraines     Seasonal allergies     Past Surgical History:   Procedure Laterality Date      SECTION        SECTION       CHOLECYSTECTOMY       HYSTERECTOMY TOTAL ABDOMINAL, BILATERAL SALPINGO-OOPHORECTOMY, COMBINED  2001    Endometriosis     LAPAROSCOPIC GASTRIC SLEEVE  2014    [place]     LAPAROSCOPY      D&C, exploratory,  placental tissue adhered to uterus     ORTHOPEDIC SURGERY  2015    rotator cuff tendon surgery Left        Social History     Tobacco Use     Smoking status: Never Smoker     Smokeless tobacco: Never Used   Substance Use Topics     Alcohol use: Yes     Alcohol/week: 0.0 standard drinks     Frequency: Monthly or less     Comment: rarely-3 drinks a year     Family History   Problem Relation Age of Onset     Hypertension Mother      Heart Failure Mother         Congestive     Other - See Comments  Mother         marcos mitchell??  hypermobility     Lipids Father         Hyperlipidemia     Cancer Father         Skin     Hypertension Father      Prostate Cancer Father      Suicide Maternal Grandmother      Diabetes Maternal Grandfather      Clotting Disorder Maternal Grandfather      Diabetes Paternal Grandmother      Kidney Disease Paternal Grandmother      Other - See Comments Paternal Grandfather         old age     Depression Brother      Diabetes Brother         type 2     Myocardial Infarction Brother 32        ETOHic, +tobacco     Peripheral Vascular Disease Brother      Hyperlipidemia Brother      Other - See Comments Sister         Post Partum DVT     Other Cancer Sister         retinal tear     Known Genetic Syndrome Daughter         marcos mitchell     Known Genetic Syndrome Daughter         marcos mitchell         Current Outpatient Medications   Medication Sig Dispense Refill     albuterol (PROAIR HFA/PROVENTIL HFA/VENTOLIN HFA) 108 (90 Base) MCG/ACT inhaler SHAKE WELL AND INHALE 2 PUFFS INTO THE LUNGS EVERY 6 HOURS AS NEEDED FOR SHORTNESS OF BREATH/ DYSPNEA OR WHEEZING 18 g 0     baclofen (LIORESAL) 10 MG tablet Take 10 mg by mouth 4 times daily  3     butalbital-acetaminophen-caffeine (FIORICET/ESGIC) -40 MG tablet TAKE 1 TABLET BY MOUTH THREE TIMES WEEKLY AS NEEDED FOR HEADACHES 36 tablet 0     carvedilol (COREG) 12.5 MG tablet Take 1 tablet (12.5 mg) by mouth 2 times daily (with meals) 180 tablet 3     cholecalciferol (VITAMIN D3) 5000 UNITS CAPS capsule Take 1 capsule (5,000 Units) by mouth daily  0     diazepam (VALIUM) 5 MG tablet TK 1 T Q 8-12 H PRN FOR MSP  5     folic acid-vit B6-vit B12 (FOLGARD) 0.8-10-0.115 MG TABS Take 1 tablet by mouth daily       gabapentin (NEURONTIN) 400 MG capsule TAKE ONE CAPSULE BY MOUTH THREE TIMES DAILY (Patient taking differently: Take 400 mg by mouth 4 times daily ) 360 capsule 3     guaiFENesin-codeine (ROBITUSSIN AC) 100-10 MG/5ML solution Take 5-10 mLs  by mouth every 4 hours as needed for cough 180 mL 0     hydrochlorothiazide (HYDRODIURIL) 12.5 MG tablet Take 1 tablet (12.5 mg) twice daily. 60 tablet 11     ketotifen (ZADITOR/REFRESH ANTI-ITCH) 0.025 % ophthalmic solution Place 1 drop into both eyes 2 times daily       losartan (COZAAR) 100 MG tablet TAKE 1 TABLET BY MOUTH EVERY DAY 90 tablet 2     montelukast (SINGULAIR) 10 MG tablet TAKE 1 TABLET BY MOUTH EVERY EVENING 30 tablet 5     Multiple Vitamins-Minerals (MULTIVITAMIN GUMMIES ADULT) CHEW Take 2 tablets by mouth daily 30 tablet 0     spironolactone (ALDACTONE) 25 MG tablet Take 1 tablet (25 mg) by mouth daily 90 tablet 3     Allergies   Allergen Reactions     Ace Inhibitors Cough     Amitriptyline Other (See Comments) and Nausea     Nightmares     Codeine      Headache and nausea      Flagyl [Metronidazole] Nausea     Morphine Nausea     Promethazine      nausea     Seasonal      Sulfa Drugs        Reviewed and updated as needed this visit by Provider  Allergies  Meds  Problems  Med Hx  Surg Hx         Review of Systems   ROS COMP: Constitutional, HEENT, cardiovascular, pulmonary, gi and gu systems are negative, except as otherwise noted. +left knee pain      Objective    /74   Pulse 65   Wt 90.7 kg (200 lb)   SpO2 94%   BMI 33.80 kg/m    Body mass index is 33.8 kg/m .  Physical Exam   GENERAL: healthy, alert and no distress  RESP: lungs clear to auscultation - no rales, rhonchi or wheezes  CV: regular rate and rhythm, normal S1 S2, no S3 or S4, no murmur, click or rub, no peripheral edema and peripheral pulses strong  MS: no gross musculoskeletal defects noted, no edema. CMS and ROM intact to left lower extremity. Left dorsalis pedis +2. Straight leg raise intact. Negative anterior/posterior drawer sign. Negative Lachman's. Negative Varus test. Slight discomfort with Valgus test. No tenderness across joint line.   SKIN: no suspicious lesions or rashes  NEURO: Normal strength and tone,  "mentation intact and speech normal  PSYCH: mentation appears normal, affect normal/bright    Diagnostic Test Results:  Labs reviewed in Epic      PROCEDURE:  XR KNEE LT 3 VW     HISTORY: Acute pain of left knee     COMPARISON:  None.     TECHNIQUE:  3 views of the left knee were obtained.     FINDINGS:  No fracture or dislocation is identified. The joint spaces  are preserved.                                                                        IMPRESSION: Normal left knee       CHRISTY MASCORRO MD    Assessment & Plan   Assessment      Plan    (M25.562) Acute pain of left knee  (primary encounter diagnosis)  Comment: check XRAY  Plan: XR Knee Left 3 Views (Clinic Performed), MR         Knee Left w/o Contrast           (S83.92XA) Sprain of left knee, initial encounter  Comment: Possible ligment tear. RICE  Plan: MR Knee Left w/o Contrast              BMI:   Estimated body mass index is 33.8 kg/m  as calculated from the following:    Height as of 12/11/19: 1.638 m (5' 4.5\").    Weight as of this encounter: 90.7 kg (200 lb).   Weight management plan: Discussed healthy diet and exercise guidelines        See Patient Instructions    Return if symptoms worsen or fail to improve.    Jill Correa NP  [facility]        "

## 2020-02-18 NOTE — TELEPHONE ENCOUNTER
7:52 AM    Reason for Call: OVERBOOK    Patient is having the following symptoms: left knee pain    The patient is requesting an appointment for: patient would like to be seen by Dr. Adame for left knee pain, she is requesting tomorrow (Wed 2/19/2020 anytime of day would work)    Was an appointment offered for this call? No  If yes : Appointment type              Date    Preferred method for responding to this message: Telephone Call     What is your phone number [phone] (work)    If we cannot reach you directly, may we leave a detailed response at the number you provided? Yes    Can this message wait until your PCP/provider returns, if unavailable today? No, please call patient back today (Tues 02/18/2020) Thank you    Elena Miranda

## 2020-02-19 ENCOUNTER — ANCILLARY PROCEDURE (OUTPATIENT)
Dept: GENERAL RADIOLOGY | Facility: OTHER | Age: 55
End: 2020-02-19
Attending: NURSE PRACTITIONER
Payer: COMMERCIAL

## 2020-02-19 ENCOUNTER — OFFICE VISIT (OUTPATIENT)
Dept: FAMILY MEDICINE | Facility: OTHER | Age: 55
End: 2020-02-19
Attending: NURSE PRACTITIONER
Payer: COMMERCIAL

## 2020-02-19 VITALS
SYSTOLIC BLOOD PRESSURE: 112 MMHG | HEART RATE: 65 BPM | DIASTOLIC BLOOD PRESSURE: 74 MMHG | OXYGEN SATURATION: 94 % | BODY MASS INDEX: 33.8 KG/M2 | WEIGHT: 200 LBS

## 2020-02-19 DIAGNOSIS — M25.562 ACUTE PAIN OF LEFT KNEE: ICD-10-CM

## 2020-02-19 DIAGNOSIS — S83.92XA SPRAIN OF LEFT KNEE, INITIAL ENCOUNTER: ICD-10-CM

## 2020-02-19 DIAGNOSIS — M25.562 ACUTE PAIN OF LEFT KNEE: Primary | ICD-10-CM

## 2020-02-19 PROCEDURE — 99213 OFFICE O/P EST LOW 20 MIN: CPT | Performed by: NURSE PRACTITIONER

## 2020-02-19 PROCEDURE — 73562 X-RAY EXAM OF KNEE 3: CPT | Mod: TC

## 2020-02-19 ASSESSMENT — PAIN SCALES - GENERAL: PAINLEVEL: MODERATE PAIN (4)

## 2020-02-19 NOTE — PATIENT INSTRUCTIONS
Patient Education     Knee Sprain    A sprain is an injury to the ligaments or capsule that holds a joint together. There are no broken bones. Most sprains take 3 to 6 weeks to heal. If it a severe sprain where the ligament is completely torn, it can take months to recover.  Most knee sprains are treated with a splint, knee immobilizer brace, or elastic wrap for support. Severe sprains may rarely require surgery.  Home care    Stay off the injured leg as much as possible until you can walk on it without pain. If you have a lot of pain with walking, crutches or a walker may be prescribed. (These can be rented or purchased at many pharmacies and surgical or orthopedic supply stores). Follow your healthcare provider's advice about when to begin putting weight on that leg.    Keep your leg elevated to reduce pain and swelling. When sleeping, place a pillow under the injured leg. When sitting, support the injured leg so it is above heart level. This is very important during the first 48 hours.    Apply an ice pack over the injured area for 15 to 20 minutes every 3 to 6 hours. You should do this for the first 24 to 48 hours. You can make an ice pack by filling a plastic bag that seals at the top with ice cubes and then wrapping it with a thin towel. Continue to use ice packs for relief of pain and swelling as needed. As the ice melts, be careful to avoid getting your wrap, splint, or cast wet. After 48 hours, apply heat (warm shower or warm bath) for 15 to 20 minutes several times a day, or alternate ice and heat. You can place the ice pack directly over the splint. If you have to wear a hook-and-loop knee brace, you can open it to apply the ice pack, or heat, directly to the knee. Never put ice directly on the skin. Always wrap the ice in a towel or other type of cloth.    You may use over-the-counter pain medicine to control pain, unless another pain medicine was prescribed. If you have chronic liver or kidney disease  or ever had a stomach ulcer or gastrointestinal bleeding, talk with your healthcare provider before using these medicines.    If you were given a splint, keep it completely dry at all times. Bathe with your splint out of the water, protected with 2 large plastic bags, sealed with rubber bands or tape at the top end. If a fiberglass splint gets wet, you can dry it with a hair dryer set to cool. If you have a hook-and-loop knee brace, you can remove this to bathe, unless told otherwise.  Follow-up care  Follow up with your doctor as advised. Any X-rays you had today don t show any broken bones, breaks, or fractures. Sometimes fractures don t show up on the first X-ray. Bruises and sprains can sometimes hurt as much as a fracture. These injuries can take time to heal completely. If your symptoms don t improve or they get worse, talk with your doctor. You may need a repeat X-ray. If X-rays were taken, you will be told of any new findings that may affect your care.  Call 911  Call 911 if you have:     Shortness of breath     Chest pain  When to seek medical advice  Call your healthcare provider right away if any of these occur:    The splint or knee immobilizer brace becomes wet or soft    The fiberglass cast or splint remains wet for more than 24 hours    Pain or swelling increases    The injured leg or toes become cold, blue, numb, or tingly  Date Last Reviewed: 5/1/2018 2000-2019 [employer]. [address] [place] [address]. All rights reserved. This information is not intended as a substitute for professional medical care. Always follow your healthcare professional's instructions.    Rest the affected painful area as much as possible.  Apply ice for 15-20 minutes intermittently as needed and especially after any offending activity. Daily stretching.  As pain recedes, begin normal activities slowly as tolerated.  Consider Physical Therapy if symptoms not better with symptomatic care.    Take  tylenol and/or ibuprofen for discomfort.   Elevate left leg.   Use ice to left knee. Protect skin.   MRI of left knee.   Wear Ace wrap to left knee for comfort. Take off at night.   Follow up if not improving.

## 2020-02-28 ENCOUNTER — HOSPITAL ENCOUNTER (OUTPATIENT)
Dept: MRI IMAGING | Facility: HOSPITAL | Age: 55
Discharge: HOME | End: 2020-02-28
Attending: NURSE PRACTITIONER | Admitting: NURSE PRACTITIONER
Payer: COMMERCIAL

## 2020-02-28 DIAGNOSIS — S83.92XA SPRAIN OF LEFT KNEE, INITIAL ENCOUNTER: ICD-10-CM

## 2020-02-28 DIAGNOSIS — M25.562 ACUTE PAIN OF LEFT KNEE: ICD-10-CM

## 2020-02-28 PROCEDURE — 73721 MRI JNT OF LWR EXTRE W/O DYE: CPT | Mod: TC,LT

## 2020-03-02 ENCOUNTER — TELEPHONE (OUTPATIENT)
Dept: FAMILY MEDICINE | Facility: OTHER | Age: 55
End: 2020-03-02

## 2020-03-02 ENCOUNTER — HEALTH MAINTENANCE LETTER (OUTPATIENT)
Age: 55
End: 2020-03-02

## 2020-03-02 DIAGNOSIS — M94.9 OSTEOCHONDRAL LESION: Primary | ICD-10-CM

## 2020-03-02 DIAGNOSIS — M89.9 OSTEOCHONDRAL LESION: Primary | ICD-10-CM

## 2020-03-02 NOTE — TELEPHONE ENCOUNTER
Pt would like to see [facility]el[facility] in [place] for physical therapy and  at [facility] for ortho consult    Thanks    Odalis Avitia LPN

## 2020-03-02 NOTE — TELEPHONE ENCOUNTER
Spoke with patient in regards to some questions she had. Advised her that pt and ortho associates will be calling the set up the referred apts.     Sindy PAUL LPN

## 2020-03-16 DIAGNOSIS — I10 ESSENTIAL HYPERTENSION, BENIGN: ICD-10-CM

## 2020-03-17 ENCOUNTER — TRANSFERRED RECORDS (OUTPATIENT)
Dept: HEALTH INFORMATION MANAGEMENT | Facility: CLINIC | Age: 55
End: 2020-03-17

## 2020-03-17 DIAGNOSIS — G43.809 OTHER MIGRAINE WITHOUT STATUS MIGRAINOSUS, NOT INTRACTABLE: ICD-10-CM

## 2020-03-17 RX ORDER — LOSARTAN POTASSIUM 100 MG/1
TABLET ORAL
Qty: 90 TABLET | Refills: 2 | Status: SHIPPED | OUTPATIENT
Start: 2020-03-17 | End: 2020-11-23

## 2020-03-17 RX ORDER — BUTALBITAL, ACETAMINOPHEN AND CAFFEINE 50; 325; 40 MG/1; MG/1; MG/1
TABLET ORAL
Qty: 36 TABLET | Refills: 3 | Status: SHIPPED | OUTPATIENT
Start: 2020-03-17 | End: 2021-03-10

## 2020-03-17 NOTE — TELEPHONE ENCOUNTER
esgic      Last Written Prescription Date:  12/12/19  Last Fill Quantity: 36,   # refills: 0  Last Office Visit: 2/19/20  Future Office visit:       Routing refill request to provider for review/approval because:  Drug not on the [facility], P or [facility] refill protocol or controlled substance

## 2020-03-19 ENCOUNTER — TRANSFERRED RECORDS (OUTPATIENT)
Dept: HEALTH INFORMATION MANAGEMENT | Facility: CLINIC | Age: 55
End: 2020-03-19

## 2020-05-16 DIAGNOSIS — I51.89 DIASTOLIC DYSFUNCTION: ICD-10-CM

## 2020-05-16 DIAGNOSIS — I10 ESSENTIAL HYPERTENSION, BENIGN: ICD-10-CM

## 2020-05-16 DIAGNOSIS — I16.0 HYPERTENSIVE URGENCY: ICD-10-CM

## 2020-05-19 RX ORDER — CARVEDILOL 12.5 MG/1
TABLET ORAL
Qty: 60 TABLET | Refills: 0 | Status: SHIPPED | OUTPATIENT
Start: 2020-05-19 | End: 2020-06-15

## 2020-05-19 RX ORDER — SPIRONOLACTONE 25 MG/1
TABLET ORAL
Qty: 30 TABLET | Refills: 0 | Status: SHIPPED | OUTPATIENT
Start: 2020-05-19 | End: 2020-06-08

## 2020-05-25 ENCOUNTER — MYC MEDICAL ADVICE (OUTPATIENT)
Dept: FAMILY MEDICINE | Facility: OTHER | Age: 55
End: 2020-05-25

## 2020-06-12 ENCOUNTER — MYC MEDICAL ADVICE (OUTPATIENT)
Dept: FAMILY MEDICINE | Facility: OTHER | Age: 55
End: 2020-06-12

## 2020-06-12 DIAGNOSIS — I10 ESSENTIAL HYPERTENSION, BENIGN: ICD-10-CM

## 2020-06-12 DIAGNOSIS — I51.89 DIASTOLIC DYSFUNCTION: ICD-10-CM

## 2020-06-12 DIAGNOSIS — I16.0 HYPERTENSIVE URGENCY: ICD-10-CM

## 2020-06-15 RX ORDER — CARVEDILOL 12.5 MG/1
TABLET ORAL
Qty: 180 TABLET | Refills: 1 | Status: SHIPPED | OUTPATIENT
Start: 2020-06-15 | End: 2020-12-07

## 2020-07-19 DIAGNOSIS — I10 ESSENTIAL HYPERTENSION, BENIGN: Primary | Chronic | ICD-10-CM

## 2020-07-21 RX ORDER — HYDROCHLOROTHIAZIDE 12.5 MG/1
CAPSULE ORAL
Qty: 60 CAPSULE | Refills: 3 | Status: SHIPPED | OUTPATIENT
Start: 2020-07-21 | End: 2020-11-09

## 2020-09-06 DIAGNOSIS — I51.89 DIASTOLIC DYSFUNCTION: ICD-10-CM

## 2020-09-06 DIAGNOSIS — I16.0 HYPERTENSIVE URGENCY: ICD-10-CM

## 2020-09-06 DIAGNOSIS — I10 ESSENTIAL HYPERTENSION, BENIGN: ICD-10-CM

## 2020-09-09 RX ORDER — SPIRONOLACTONE 25 MG/1
TABLET ORAL
Qty: 90 TABLET | Refills: 0 | Status: SHIPPED | OUTPATIENT
Start: 2020-09-09 | End: 2020-12-07

## 2020-09-09 NOTE — TELEPHONE ENCOUNTER
Aldactone 25 mg      Last Written Prescription Date:  6/8/20  Last Fill Quantity: 90,   # refills: 0  Last Office Visit: 2/19/20  Future Office visit:       Routing refill request to provider for review/approval because:

## 2020-11-09 DIAGNOSIS — I10 ESSENTIAL HYPERTENSION, BENIGN: Chronic | ICD-10-CM

## 2020-11-09 RX ORDER — HYDROCHLOROTHIAZIDE 12.5 MG/1
CAPSULE ORAL
Qty: 60 CAPSULE | Refills: 3 | Status: SHIPPED | OUTPATIENT
Start: 2020-11-09 | End: 2021-03-22

## 2020-11-09 NOTE — TELEPHONE ENCOUNTER
Provider at bedside       Johnson Peralta RN  10/27/17 2805 hydrochlorothiazide (MICROZIDE) 12.5 MG capsule      Last Written Prescription Date:  07/21/20  Last Fill Quantity: 60,   # refills: 11  Last Office Visit: 1/6/20  Future Office visit:

## 2020-12-07 DIAGNOSIS — I10 ESSENTIAL HYPERTENSION, BENIGN: ICD-10-CM

## 2020-12-07 DIAGNOSIS — I51.89 DIASTOLIC DYSFUNCTION: ICD-10-CM

## 2020-12-07 DIAGNOSIS — I16.0 HYPERTENSIVE URGENCY: ICD-10-CM

## 2020-12-07 RX ORDER — CARVEDILOL 12.5 MG/1
TABLET ORAL
Qty: 180 TABLET | Refills: 1 | Status: SHIPPED | OUTPATIENT
Start: 2020-12-07 | End: 2021-06-08

## 2020-12-07 NOTE — TELEPHONE ENCOUNTER
Carvedilol 12.5 mg      Last Written Prescription Date:  06/15/2020  Last Fill Quantity: 180,   # refills: 1  Last Office Visit: 02/19/2020  Future Office visit:       Spironolactone 25 mg      Last Written Prescription Date:  09/09/2020  Last Fill Quantity: 90,   # refills: 0

## 2020-12-15 DIAGNOSIS — E55.9 AVITAMINOSIS D: ICD-10-CM

## 2020-12-15 DIAGNOSIS — G35 MULTIPLE SCLEROSIS (H): ICD-10-CM

## 2020-12-15 DIAGNOSIS — M62.81 MUSCLE WEAKNESS (GENERALIZED): Primary | ICD-10-CM

## 2020-12-15 LAB
ALBUMIN SERPL-MCNC: 3.9 G/DL (ref 3.4–5)
ALP SERPL-CCNC: 99 U/L (ref 40–150)
ALT SERPL W P-5'-P-CCNC: 20 U/L (ref 0–50)
ANION GAP SERPL CALCULATED.3IONS-SCNC: 6 MMOL/L (ref 3–14)
AST SERPL W P-5'-P-CCNC: 11 U/L (ref 0–45)
BILIRUB SERPL-MCNC: 0.3 MG/DL (ref 0.2–1.3)
BUN SERPL-MCNC: 20 MG/DL (ref 7–30)
CALCIUM SERPL-MCNC: 9.6 MG/DL (ref 8.5–10.1)
CHLORIDE SERPL-SCNC: 102 MMOL/L (ref 94–109)
CO2 SERPL-SCNC: 31 MMOL/L (ref 20–32)
CREAT SERPL-MCNC: 0.8 MG/DL (ref 0.52–1.04)
ERYTHROCYTE [DISTWIDTH] IN BLOOD BY AUTOMATED COUNT: 13.2 % (ref 10–15)
GFR SERPL CREATININE-BSD FRML MDRD: 83 ML/MIN/{1.73_M2}
GLUCOSE SERPL-MCNC: 76 MG/DL (ref 70–99)
HCT VFR BLD AUTO: 37.1 % (ref 35–47)
HGB BLD-MCNC: 12.2 G/DL (ref 11.7–15.7)
MCH RBC QN AUTO: 29.5 PG (ref 26.5–33)
MCHC RBC AUTO-ENTMCNC: 32.9 G/DL (ref 31.5–36.5)
MCV RBC AUTO: 90 FL (ref 78–100)
PLATELET # BLD AUTO: 268 10E9/L (ref 150–450)
POTASSIUM SERPL-SCNC: 3.9 MMOL/L (ref 3.4–5.3)
PROT SERPL-MCNC: 7.8 G/DL (ref 6.8–8.8)
RBC # BLD AUTO: 4.13 10E12/L (ref 3.8–5.2)
SODIUM SERPL-SCNC: 139 MMOL/L (ref 133–144)
T4 FREE SERPL-MCNC: 0.82 NG/DL (ref 0.76–1.46)
TSH SERPL DL<=0.005 MIU/L-ACNC: 5.63 MU/L (ref 0.4–4)
WBC # BLD AUTO: 6.6 10E9/L (ref 4–11)

## 2020-12-15 PROCEDURE — 80053 COMPREHEN METABOLIC PANEL: CPT | Performed by: PSYCHIATRY & NEUROLOGY

## 2020-12-15 PROCEDURE — 82306 VITAMIN D 25 HYDROXY: CPT | Performed by: PSYCHIATRY & NEUROLOGY

## 2020-12-15 PROCEDURE — 84480 ASSAY TRIIODOTHYRONINE (T3): CPT | Performed by: PSYCHIATRY & NEUROLOGY

## 2020-12-15 PROCEDURE — 84436 ASSAY OF TOTAL THYROXINE: CPT | Performed by: PSYCHIATRY & NEUROLOGY

## 2020-12-15 PROCEDURE — 36415 COLL VENOUS BLD VENIPUNCTURE: CPT | Performed by: PSYCHIATRY & NEUROLOGY

## 2020-12-15 PROCEDURE — 82607 VITAMIN B-12: CPT | Performed by: PSYCHIATRY & NEUROLOGY

## 2020-12-15 PROCEDURE — 85027 COMPLETE CBC AUTOMATED: CPT | Performed by: PSYCHIATRY & NEUROLOGY

## 2020-12-15 PROCEDURE — 84439 ASSAY OF FREE THYROXINE: CPT | Performed by: PSYCHIATRY & NEUROLOGY

## 2020-12-15 PROCEDURE — 84443 ASSAY THYROID STIM HORMONE: CPT | Performed by: PSYCHIATRY & NEUROLOGY

## 2020-12-16 LAB
T3 SERPL-MCNC: 84 NG/DL (ref 60–181)
T4 SERPL-MCNC: 8.8 UG/DL (ref 4.5–13.9)
VIT B12 SERPL-MCNC: 1343 PG/ML (ref 193–986)

## 2020-12-17 LAB — DEPRECATED CALCIDIOL+CALCIFEROL SERPL-MC: 67 UG/L (ref 20–75)

## 2020-12-28 RX ORDER — SPIRONOLACTONE 25 MG/1
25 TABLET ORAL DAILY
Qty: 90 TABLET | Refills: 0 | Status: SHIPPED | OUTPATIENT
Start: 2020-12-28 | End: 2021-03-22

## 2021-01-10 ENCOUNTER — HOSPITAL ENCOUNTER (EMERGENCY)
Facility: HOSPITAL | Age: 56
Discharge: HOME | End: 2021-01-10
Attending: PHYSICIAN ASSISTANT | Admitting: PHYSICIAN ASSISTANT
Payer: COMMERCIAL

## 2021-01-10 VITALS
SYSTOLIC BLOOD PRESSURE: 140 MMHG | DIASTOLIC BLOOD PRESSURE: 85 MMHG | HEART RATE: 69 BPM | TEMPERATURE: 99.7 F | OXYGEN SATURATION: 99 % | WEIGHT: 204.3 LBS | RESPIRATION RATE: 16 BRPM | BODY MASS INDEX: 34.53 KG/M2

## 2021-01-10 DIAGNOSIS — J02.9 PHARYNGITIS, UNSPECIFIED ETIOLOGY: ICD-10-CM

## 2021-01-10 DIAGNOSIS — J35.8 TONSILLAR CALCULUS: ICD-10-CM

## 2021-01-10 LAB
SPECIMEN SOURCE: NORMAL
STREP GROUP A PCR: NOT DETECTED

## 2021-01-10 PROCEDURE — 99213 OFFICE O/P EST LOW 20 MIN: CPT | Performed by: PHYSICIAN ASSISTANT

## 2021-01-10 PROCEDURE — 87651 STREP A DNA AMP PROBE: CPT | Performed by: EMERGENCY MEDICINE

## 2021-01-10 PROCEDURE — G0463 HOSPITAL OUTPT CLINIC VISIT: HCPCS

## 2021-01-10 PROCEDURE — 250N000009 HC RX 250: Performed by: PHYSICIAN ASSISTANT

## 2021-01-10 RX ORDER — DEXAMETHASONE SODIUM PHOSPHATE 4 MG/ML
10 VIAL (ML) INJECTION ONCE
Status: COMPLETED | OUTPATIENT
Start: 2021-01-10 | End: 2021-01-10

## 2021-01-10 RX ADMIN — DEXAMETHASONE SODIUM PHOSPHATE 10 MG: 4 INJECTION, SOLUTION INTRAMUSCULAR; INTRAVENOUS at 09:55

## 2021-01-10 ASSESSMENT — ENCOUNTER SYMPTOMS
TROUBLE SWALLOWING: 0
EYE REDNESS: 0
CARDIOVASCULAR NEGATIVE: 1
FATIGUE: 1
EYE DISCHARGE: 0
RESPIRATORY NEGATIVE: 1
FACIAL SWELLING: 0
NEUROLOGICAL NEGATIVE: 1
HEADACHES: 0
COUGH: 0
SORE THROAT: 1
PSYCHIATRIC NEGATIVE: 1

## 2021-01-10 NOTE — ED PROVIDER NOTES
History     Chief Complaint   Patient presents with     Pharyngitis     HPI  Yvonne Llanos is a 55 year old female with Hx MS, HTN who presents with sore throat. Throat is described as initially scratchy yesterday, followed by throbbing/swelling sensation last night. She felt like mucus/swelling inhibited breathing, but sitting up resolved symptoms over time. She did check her throat and thought her uvula was swollen, but this has also resolved. Feels like she could have strep, although it has been some time since she has had it. Hx tonsil stones and feels like she may have one on the LTside. Can chew, speak, swallow - no drooling. No fevers, but has been using tylenol for pain. No known ill contacts. She started Amantadine 2 weeks ago - no new supplements, foods.       Allergies:  Allergies   Allergen Reactions     Ace Inhibitors Cough     Amitriptyline Other (See Comments) and Nausea     Nightmares     Codeine      Headache and nausea      Flagyl [Metronidazole] Nausea     Morphine Nausea     Promethazine      nausea     Seasonal      Sulfa Drugs        Problem List:    Patient Active Problem List    Diagnosis Date Noted     KARRIE (obstructive sleep apnea)- severe (AHI 30)      Priority: Medium     Polysomnography 9/12/13 The apnea hypopnea index was 30 events per hour with desats down to 82 %.  CPAP 8 cm effective.        Migraines      Priority: Medium     Other fatigue 05/30/2019     Priority: Medium     Obesity 05/30/2019     Priority: Medium     Resistant hypertension 05/30/2019     Priority: Medium     Diastolic dysfunction grade 1 on 5/10/2019 05/20/2019     Priority: Medium     Subclinical hypothyroidism 04/29/2019     Priority: Medium     Hx of cardiac murmur 04/29/2019     Priority: Medium     Fibromyalgia 03/28/2018     Priority: Medium     MS (multiple sclerosis) (H) 05/04/2017     Priority: Medium     Rotator cuff tendonitis, left 02/01/2016     Priority: Medium     Essential hypertension, benign  2014     Priority: Medium     Status post bariatric surgery 2014     Priority: Medium     S/p gastric sleeve        Hyperlipidemia LDL goal <130 2013     Priority: Medium     Insulin resistance 2013     Priority: Medium     Tear film insufficiency 2013     Priority: Medium     Seasonal allergies 2011     Priority: Medium        Past Medical History:    Past Medical History:   Diagnosis Date     Endometriosis      Hypertensive urgency 2019     Other abnormal glucose 2011     Pancreatitis due to biliary obstruction        Past Surgical History:    Past Surgical History:   Procedure Laterality Date      SECTION        SECTION       CHOLECYSTECTOMY       HYSTERECTOMY TOTAL ABDOMINAL, BILATERAL SALPINGO-OOPHORECTOMY, COMBINED  2001    Endometriosis     LAPAROSCOPIC GASTRIC SLEEVE  2014    [place]     LAPAROSCOPY      D&C, exploratory,  placental tissue adhered to uterus     ORTHOPEDIC SURGERY  2015    rotator cuff tendon surgery Left        Family History:    Family History   Problem Relation Age of Onset     Hypertension Mother      Heart Failure Mother         Congestive     Other - See Comments Mother         marcos danlos??  hypermobility     Lipids Father         Hyperlipidemia     Cancer Father         Skin     Hypertension Father      Prostate Cancer Father      Suicide Maternal Grandmother      Diabetes Maternal Grandfather      Clotting Disorder Maternal Grandfather      Diabetes Paternal Grandmother      Kidney Disease Paternal Grandmother      Other - See Comments Paternal Grandfather         old age     Depression Brother      Diabetes Brother         type 2     Myocardial Infarction Brother 32        ETOHic, +tobacco     Peripheral Vascular Disease Brother      Hyperlipidemia Brother      Other - See Comments Sister         Post Partum DVT     Other Cancer Sister         retinal tear     Known Genetic Syndrome Daughter          marcos mitchell     Known Genetic Syndrome Daughter         marcos mitchell       Social History:  Marital Status:   [2]  Social History     Tobacco Use     Smoking status: Never Smoker     Smokeless tobacco: Never Used   Substance Use Topics     Alcohol use: Yes     Alcohol/week: 0.0 standard drinks     Frequency: Monthly or less     Comment: rarely-3 drinks a year     Drug use: No        Medications:         albuterol (PROAIR HFA/PROVENTIL HFA/VENTOLIN HFA) 108 (90 Base) MCG/ACT inhaler       baclofen (LIORESAL) 10 MG tablet       butalbital-acetaminophen-caffeine (ESGIC) -40 MG tablet       carvedilol (COREG) 12.5 MG tablet       cholecalciferol (VITAMIN D3) 5000 UNITS CAPS capsule       diazepam (VALIUM) 5 MG tablet       folic acid-vit B6-vit B12 (FOLGARD) 0.8-10-0.115 MG TABS       gabapentin (NEURONTIN) 400 MG capsule       guaiFENesin-codeine (ROBITUSSIN AC) 100-10 MG/5ML solution       hydrochlorothiazide (HYDRODIURIL) 12.5 MG tablet       hydrochlorothiazide (MICROZIDE) 12.5 MG capsule       ketotifen (ZADITOR/REFRESH ANTI-ITCH) 0.025 % ophthalmic solution       losartan (COZAAR) 100 MG tablet       montelukast (SINGULAIR) 10 MG tablet       Multiple Vitamins-Minerals (MULTIVITAMIN GUMMIES ADULT) CHEW       spironolactone (ALDACTONE) 25 MG tablet          Review of Systems   Constitutional: Positive for fatigue (mild ).   HENT: Positive for sore throat. Negative for dental problem, drooling, facial swelling, mouth sores, postnasal drip and trouble swallowing (resolved).    Eyes: Negative for discharge and redness.   Respiratory: Negative.  Negative for cough.    Cardiovascular: Negative.    Skin: Negative for rash.   Neurological: Negative.  Negative for headaches.   Psychiatric/Behavioral: Negative.        Physical Exam   BP: 140/85  Pulse: 69  Temp: 99.7  F (37.6  C)  Resp: 16  Weight: 92.7 kg (204 lb 4.8 oz)  SpO2: 99 %      Physical Exam  Vitals signs and nursing note reviewed.    Constitutional:       General: She is not in acute distress.     Appearance: She is not ill-appearing.   HENT:      Head: Normocephalic and atraumatic.      Right Ear: Tympanic membrane normal.      Left Ear: Tympanic membrane normal.      Mouth/Throat:      Mouth: Mucous membranes are moist. No oral lesions.      Dentition: Normal dentition.      Tongue: No lesions.      Pharynx: Posterior oropharyngeal erythema present. No pharyngeal swelling.      Comments: No swelling of tongue, uvula. No tonsil stones visualized, no unilateral swelling.   Cardiovascular:      Rate and Rhythm: Normal rate and regular rhythm.   Pulmonary:      Effort: Pulmonary effort is normal.      Breath sounds: Normal breath sounds.   Skin:     General: Skin is warm and dry.   Neurological:      Mental Status: She is alert and oriented to person, place, and time.   Psychiatric:         Mood and Affect: Mood normal.         ED Course        Procedures    Results for orders placed or performed during the hospital encounter of 01/10/21 (from the past 24 hour(s))   Group A Streptococcus PCR Throat Swab    Specimen: Throat   Result Value Ref Range    Specimen Description Throat     Strep Group A PCR Not Detected NDET^Not Detected       Medications   dexamethasone (DECADRON) injectable solution used ORALLY 10 mg (10 mg Oral Given 1/10/21 0955)       Assessments & Plan (with Medical Decision Making)     I have reviewed the nursing notes.    I have reviewed the findings, diagnosis, plan and need for follow up with the patient.    Discharge Medication List as of 1/10/2021  9:57 AM          Final diagnoses:   Pharyngitis, unspecified etiology   Tonsillar calculus - history of   Rapid strep is negative. I do not see evidence of tonsil stone or swelling at this time, although with patients report/concern for uvula swelling, she received 1x dose decadron as she has tolerated steroid in the past. Discussed using water pick if continues to have sensation  or sees tonsil stone. She is to f/u with PCP with poor progression, returning here with difficulty breathing, facial swelling, drooling, inability to chew/speak/swallow. Pt agreeable to plan and discharged home stable.     1/10/2021   [facility]     Tyrel Grigsby PA  01/10/21 1217

## 2021-01-10 NOTE — ED TRIAGE NOTES
Pt is here with c/o sore throat and uvula  Reports swollen uvula that is bloody  Taking OTC analgesics  Sore throat ongoing since yesterday

## 2021-01-10 NOTE — DISCHARGE INSTRUCTIONS
- Coat the throat by eating oatmeal or taking honey in warm tea (if older than 1 year).  - Saltwater gargles to support mucosa/throat lining. (May add a sprinkle of cayenne pepper if tolerated for warmth/numbing effect of capsaicin)  - Tylenol or ibuprofen for pain. May rotate every 4-6 hrs.     - Must be seen in ED sooner if symptoms worsen: one-sided throat swelling, recurrence of uvula swelling, shortness of breath, difficulty swallowing, persistent fevers, overall worsening despite treatment.

## 2021-01-10 NOTE — ED AVS SNAPSHOT
[facility]  [address] [address]  Phone: [phone]                                    Yvonne Llanos   MRN: [id number]    Department: [facility]   Date of Visit: 1/10/2021           After Visit Summary Signature Page    I have received my discharge instructions, and my questions have been answered. I have discussed any challenges I see with this plan with the nurse or doctor.    ..........................................................................................................................................  Patient/Patient Representative Signature      ..........................................................................................................................................  Patient Representative Print Name and Relationship to Patient    ..................................................               ................................................  Date                                   Time    ..........................................................................................................................................  Reviewed by Signature/Title    ...................................................              ..............................................  Date                                               Time          22EPIC Rev 08/18

## 2021-01-15 ENCOUNTER — HEALTH MAINTENANCE LETTER (OUTPATIENT)
Age: 56
End: 2021-01-15

## 2021-02-24 ENCOUNTER — OFFICE VISIT (OUTPATIENT)
Dept: FAMILY MEDICINE | Facility: OTHER | Age: 56
End: 2021-02-24
Attending: FAMILY MEDICINE
Payer: COMMERCIAL

## 2021-02-24 VITALS
HEART RATE: 54 BPM | SYSTOLIC BLOOD PRESSURE: 122 MMHG | OXYGEN SATURATION: 98 % | HEIGHT: 66 IN | BODY MASS INDEX: 33.11 KG/M2 | DIASTOLIC BLOOD PRESSURE: 74 MMHG | WEIGHT: 206 LBS

## 2021-02-24 DIAGNOSIS — Z00.00 ROUTINE GENERAL MEDICAL EXAMINATION AT A HEALTH CARE FACILITY: Primary | ICD-10-CM

## 2021-02-24 DIAGNOSIS — Z13.220 LIPID SCREENING: ICD-10-CM

## 2021-02-24 PROCEDURE — 99396 PREV VISIT EST AGE 40-64: CPT | Performed by: FAMILY MEDICINE

## 2021-02-24 ASSESSMENT — ANXIETY QUESTIONNAIRES
6. BECOMING EASILY ANNOYED OR IRRITABLE: NOT AT ALL
4. TROUBLE RELAXING: NOT AT ALL
2. NOT BEING ABLE TO STOP OR CONTROL WORRYING: NOT AT ALL
5. BEING SO RESTLESS THAT IT IS HARD TO SIT STILL: NOT AT ALL
1. FEELING NERVOUS, ANXIOUS, OR ON EDGE: NOT AT ALL
GAD7 TOTAL SCORE: 0
GAD7 TOTAL SCORE: 0
7. FEELING AFRAID AS IF SOMETHING AWFUL MIGHT HAPPEN: NOT AT ALL
GAD7 TOTAL SCORE: 0
3. WORRYING TOO MUCH ABOUT DIFFERENT THINGS: NOT AT ALL
7. FEELING AFRAID AS IF SOMETHING AWFUL MIGHT HAPPEN: NOT AT ALL

## 2021-02-24 ASSESSMENT — MIFFLIN-ST. JEOR: SCORE: 1546.16

## 2021-02-24 ASSESSMENT — PATIENT HEALTH QUESTIONNAIRE - PHQ9
10. IF YOU CHECKED OFF ANY PROBLEMS, HOW DIFFICULT HAVE THESE PROBLEMS MADE IT FOR YOU TO DO YOUR WORK, TAKE CARE OF THINGS AT HOME, OR GET ALONG WITH OTHER PEOPLE: SOMEWHAT DIFFICULT
SUM OF ALL RESPONSES TO PHQ QUESTIONS 1-9: 4
SUM OF ALL RESPONSES TO PHQ QUESTIONS 1-9: 4

## 2021-02-24 ASSESSMENT — PAIN SCALES - GENERAL: PAINLEVEL: NO PAIN (0)

## 2021-02-24 NOTE — NURSING NOTE
"Chief Complaint   Patient presents with     Physical       Initial /74   Pulse 54   Ht 1.676 m (5' 6\")   Wt 93.4 kg (206 lb)   SpO2 98%   BMI 33.25 kg/m   Estimated body mass index is 33.25 kg/m  as calculated from the following:    Height as of this encounter: 1.676 m (5' 6\").    Weight as of this encounter: 93.4 kg (206 lb).  Medication Reconciliation: complete  Cathy Greene LPN  "

## 2021-02-24 NOTE — PROGRESS NOTES
SUBJECTIVE:   CC: Yvonne Llanos is an 55 year old woman who presents for preventive health visit.       Patient has been advised of split billing requirements and indicates understanding: Yes  Healthy Habits:     Getting at least 3 servings of Calcium per day:  NO    Bi-annual eye exam:  Yes    Dental care twice a year:  Yes    Sleep apnea or symptoms of sleep apnea:  Daytime drowsiness    Diet:  Low salt    Frequency of exercise:  2-3 days/week    Duration of exercise:  15-30 minutes    Taking medications regularly:  Yes    Medication side effects:  Other    PHQ-2 Total Score: 0    Additional concerns today:  No              Today's PHQ-2 Score:   PHQ-2 ( 1999 [employer]) 2/24/2021   Q1: Little interest or pleasure in doing things 0   Q2: Feeling down, depressed or hopeless 0   PHQ-2 Score 0   Q1: Little interest or pleasure in doing things Not at all   Q2: Feeling down, depressed or hopeless Not at all   PHQ-2 Score 0       Abuse: Current or Past (Physical, Sexual or Emotional) - No  Do you feel safe in your environment? Yes    Have you ever done Advance Care Planning? (For example, a Health Directive, POLST, or a discussion with a medical provider or your loved ones about your wishes): No, advance care planning information given to patient to review.  Patient declined advance care planning discussion at this time.    Social History     Tobacco Use     Smoking status: Never Smoker     Smokeless tobacco: Never Used   Substance Use Topics     Alcohol use: Yes     Alcohol/week: 0.0 standard drinks     Frequency: Monthly or less     Comment: rarely-3 drinks a year         Alcohol Use 2/24/2021   Prescreen: >3 drinks/day or >7 drinks/week? No   Prescreen: >3 drinks/day or >7 drinks/week? -         Reviewed orders with patient.  Reviewed health maintenance and updated orders accordingly - Yes  Lab work is in process    Breast CA Risk Screening:  No flowsheet data found.      Declines mammogram.    Pertinent mammograms  "are reviewed under the imaging tab.    History of abnormal Pap smear:      Reviewed and updated as needed this visit by clinical staff  Tobacco                Reviewed and updated as needed this visit by Provider                Past Medical History:   Diagnosis Date     Endometriosis      Hypertensive urgency 2019     Other abnormal glucose 2011     Pancreatitis due to biliary obstruction     improved after cholecystectomy      Past Surgical History:   Procedure Laterality Date      SECTION        SECTION       CHOLECYSTECTOMY       HYSTERECTOMY TOTAL ABDOMINAL, BILATERAL SALPINGO-OOPHORECTOMY, COMBINED  2001    Endometriosis     LAPAROSCOPIC GASTRIC SLEEVE  2014    [place]     LAPAROSCOPY      D&C, exploratory,  placental tissue adhered to uterus     ORTHOPEDIC SURGERY  2015    rotator cuff tendon surgery Left        Review of Systems  CONSTITUTIONAL: NEGATIVE for fever, chills, change in weight  INTEGUMENTARY/SKIN: NEGATIVE for worrisome rashes, moles or lesions  EYES: NEGATIVE for vision changes or irritation  ENT: NEGATIVE for ear, mouth and throat problems  RESP: NEGATIVE for significant cough or SOB  BREAST: NEGATIVE for masses, tenderness or discharge  CV: NEGATIVE for chest pain, palpitations or peripheral edema  GI: NEGATIVE for nausea, abdominal pain, heartburn, or change in bowel habits  : NEGATIVE for unusual urinary or vaginal symptoms. No vaginal bleeding.  MUSCULOSKELETAL: NEGATIVE for significant arthralgias or myalgia  NEURO: NEGATIVE for weakness, dizziness or paresthesias  PSYCHIATRIC: NEGATIVE for changes in mood or affect      OBJECTIVE:   /74   Pulse 54   Ht 1.676 m (5' 6\")   Wt 93.4 kg (206 lb)   SpO2 98%   BMI 33.25 kg/m    Physical Exam  GENERAL: healthy, alert and no distress  EYES: Eyes grossly normal to inspection, PERRL and conjunctivae and sclerae normal  HENT: ear canals and TM's normal, nose and mouth without ulcers or " "lesions  NECK: no adenopathy, no asymmetry, masses, or scars and thyroid normal to palpation  RESP: lungs clear to auscultation - no rales, rhonchi or wheezes  BREAST: normal without masses, tenderness or nipple discharge and no palpable axillary masses or adenopathy  CV: regular rate and rhythm, normal S1 S2, no S3 or S4, no murmur, click or rub, no peripheral edema and peripheral pulses strong  ABDOMEN: soft, nontender, no hepatosplenomegaly, no masses and bowel sounds normal  MS: no gross musculoskeletal defects noted, no edema  SKIN: no suspicious lesions or rashes  NEURO: Normal strength and tone, mentation intact and speech normal  PSYCH: mentation appears normal, affect normal/bright    Diagnostic Test Results:  Labs reviewed in Epic    ASSESSMENT/PLAN:       ICD-10-CM    1. Routine general medical examination at a health care facility  Z00.00    2. Lipid screening  Z13.220 Lipid Profile     CANCELED: Comprehensive metabolic panel       Patient has been advised of split billing requirements and indicates understanding:   COUNSELING:  Reviewed preventive health counseling, as reflected in patient instructions    Estimated body mass index is 33.25 kg/m  as calculated from the following:    Height as of this encounter: 1.676 m (5' 6\").    Weight as of this encounter: 93.4 kg (206 lb).        She reports that she has never smoked. She has never used smokeless tobacco.      Counseling Resources:  ATP IV Guidelines  Pooled Cohorts Equation Calculator  Breast Cancer Risk Calculator  BRCA-Related Cancer Risk Assessment: FHS-7 Tool  FRAX Risk Assessment  ICSI Preventive Guidelines  Dietary Guidelines for Americans, 2010  USDA's MyPlate  ASA Prophylaxis  Lung CA Screening    Cholo Adame MD  [facility]  "

## 2021-02-25 ASSESSMENT — ANXIETY QUESTIONNAIRES: GAD7 TOTAL SCORE: 0

## 2021-02-25 ASSESSMENT — PATIENT HEALTH QUESTIONNAIRE - PHQ9: SUM OF ALL RESPONSES TO PHQ QUESTIONS 1-9: 4

## 2021-02-26 DIAGNOSIS — Z13.220 LIPID SCREENING: ICD-10-CM

## 2021-02-26 LAB
CHOLEST SERPL-MCNC: 251 MG/DL
HDLC SERPL-MCNC: 58 MG/DL
LDLC SERPL CALC-MCNC: 148 MG/DL
NONHDLC SERPL-MCNC: 193 MG/DL
TRIGL SERPL-MCNC: 225 MG/DL

## 2021-02-26 PROCEDURE — 36415 COLL VENOUS BLD VENIPUNCTURE: CPT | Performed by: FAMILY MEDICINE

## 2021-02-26 PROCEDURE — 80061 LIPID PANEL: CPT | Performed by: FAMILY MEDICINE

## 2021-03-10 DIAGNOSIS — G43.809 OTHER MIGRAINE WITHOUT STATUS MIGRAINOSUS, NOT INTRACTABLE: ICD-10-CM

## 2021-03-10 RX ORDER — BUTALBITAL, ACETAMINOPHEN AND CAFFEINE 50; 325; 40 MG/1; MG/1; MG/1
TABLET ORAL
Qty: 36 TABLET | Refills: 0 | Status: SHIPPED | OUTPATIENT
Start: 2021-03-10 | End: 2021-06-21

## 2021-03-10 NOTE — TELEPHONE ENCOUNTER
ESGIC      Last Written Prescription Date:  3-  Last Fill Quantity: 36,   # refills: 3  Last Office Visit: 2-  Future Office visit:       Routing refill request to provider for review/approval because:  Drug not on the [facility], P or [facility] refill protocol or controlled substance

## 2021-03-20 DIAGNOSIS — I16.0 HYPERTENSIVE URGENCY: ICD-10-CM

## 2021-03-20 DIAGNOSIS — I10 ESSENTIAL HYPERTENSION, BENIGN: Chronic | ICD-10-CM

## 2021-03-20 DIAGNOSIS — I10 ESSENTIAL HYPERTENSION, BENIGN: ICD-10-CM

## 2021-03-20 DIAGNOSIS — I51.89 DIASTOLIC DYSFUNCTION: ICD-10-CM

## 2021-03-22 RX ORDER — HYDROCHLOROTHIAZIDE 12.5 MG/1
CAPSULE ORAL
Qty: 60 CAPSULE | Refills: 3 | Status: SHIPPED | OUTPATIENT
Start: 2021-03-22 | End: 2021-09-20

## 2021-03-22 RX ORDER — SPIRONOLACTONE 25 MG/1
TABLET ORAL
Qty: 90 TABLET | Refills: 3 | Status: SHIPPED | OUTPATIENT
Start: 2021-03-22 | End: 2022-03-14

## 2021-05-23 DIAGNOSIS — I10 ESSENTIAL HYPERTENSION, BENIGN: ICD-10-CM

## 2021-05-24 RX ORDER — LOSARTAN POTASSIUM 100 MG/1
TABLET ORAL
Qty: 90 TABLET | Refills: 3 | Status: SHIPPED | OUTPATIENT
Start: 2021-05-24 | End: 2022-05-04

## 2021-06-08 DIAGNOSIS — I10 ESSENTIAL HYPERTENSION, BENIGN: ICD-10-CM

## 2021-06-08 DIAGNOSIS — I16.0 HYPERTENSIVE URGENCY: ICD-10-CM

## 2021-06-08 DIAGNOSIS — I51.89 DIASTOLIC DYSFUNCTION: ICD-10-CM

## 2021-06-08 RX ORDER — CARVEDILOL 12.5 MG/1
TABLET ORAL
Qty: 180 TABLET | Refills: 3 | Status: SHIPPED | OUTPATIENT
Start: 2021-06-08 | End: 2022-06-06

## 2021-06-20 DIAGNOSIS — G43.809 OTHER MIGRAINE WITHOUT STATUS MIGRAINOSUS, NOT INTRACTABLE: ICD-10-CM

## 2021-06-21 RX ORDER — BUTALBITAL, ACETAMINOPHEN AND CAFFEINE 50; 325; 40 MG/1; MG/1; MG/1
TABLET ORAL
Qty: 36 TABLET | Refills: 0 | Status: SHIPPED | OUTPATIENT
Start: 2021-06-21 | End: 2021-09-17

## 2021-06-21 NOTE — TELEPHONE ENCOUNTER
ESGIC      Last Written Prescription Date:  3-  Last Fill Quantity: 36,   # refills: 0  Last Office Visit: 2-  Future Office visit:       Routing refill request to provider for review/approval because:  Drug not on the [facility], [facility] or [facility] refill protocol or controlled substance

## 2021-06-22 ENCOUNTER — NURSE TRIAGE (OUTPATIENT)
Dept: FAMILY MEDICINE | Facility: OTHER | Age: 56
End: 2021-06-22

## 2021-06-22 NOTE — TELEPHONE ENCOUNTER
"Patient is coming from [place] and would like an overbook today in [place] if possible. [phone].    Patient is vaccinated for Covid and did an at home Covid test that was negative.    Reason for Disposition    Patient wants to be seen    Answer Assessment - Initial Assessment Questions  1. ONSET: \"When did the cough begin?\"       Over a week ago  2. SEVERITY: \"How bad is the cough today?\"       Cough with phlegm  3. RESPIRATORY DISTRESS: \"Describe your breathing.\"       wheezing  4. FEVER: \"Do you have a fever?\" If so, ask: \"What is your temperature, how was it measured, and when did it start?\"      no  5. HEMOPTYSIS: \"Are you coughing up any blood?\" If so ask: \"How much?\" (flecks, streaks, tablespoons, etc.)      no  6. TREATMENT: \"What have you done so far to treat the cough?\" (e.g., meds, fluids, humidifier)      humidifier advil nyquil  7. CARDIAC HISTORY: \"Do you have any history of heart disease?\" (e.g., heart attack, congestive heart failure)       Diastolic dysfunction   8. LUNG HISTORY: \"Do you have any history of lung disease?\"  (e.g., pulmonary embolus, asthma, emphysema)      No pat has MS  9. PE RISK FACTORS: \"Do you have a history of blood clots?\" (or: recent major surgery, recent prolonged travel, bedridden)      no  10. OTHER SYMPTOMS: \"Do you have any other symptoms? (e.g., runny nose, wheezing, chest pain)        Wheezing runny nose  Right side congestion  11. PREGNANCY: \"Is there any chance you are pregnant?\" \"When was your last menstrual period?\"        no  12. TRAVEL: \"Have you traveled out of the country in the last month?\" (e.g., travel history, exposures)        no    Protocols used: COUGH-A-OH      "

## 2021-06-23 ENCOUNTER — OFFICE VISIT (OUTPATIENT)
Dept: FAMILY MEDICINE | Facility: OTHER | Age: 56
End: 2021-06-23
Attending: FAMILY MEDICINE
Payer: COMMERCIAL

## 2021-06-23 ENCOUNTER — ANCILLARY PROCEDURE (OUTPATIENT)
Dept: GENERAL RADIOLOGY | Facility: OTHER | Age: 56
End: 2021-06-23
Attending: FAMILY MEDICINE
Payer: COMMERCIAL

## 2021-06-23 ENCOUNTER — TELEPHONE (OUTPATIENT)
Dept: FAMILY MEDICINE | Facility: OTHER | Age: 56
End: 2021-06-23

## 2021-06-23 VITALS
SYSTOLIC BLOOD PRESSURE: 120 MMHG | DIASTOLIC BLOOD PRESSURE: 78 MMHG | BODY MASS INDEX: 33.73 KG/M2 | WEIGHT: 209 LBS | HEART RATE: 55 BPM | OXYGEN SATURATION: 97 % | TEMPERATURE: 99.2 F

## 2021-06-23 DIAGNOSIS — R07.81 PLEURITIC CHEST PAIN: ICD-10-CM

## 2021-06-23 DIAGNOSIS — J01.01 ACUTE RECURRENT MAXILLARY SINUSITIS: Primary | ICD-10-CM

## 2021-06-23 LAB
BASOPHILS # BLD AUTO: 0 10E9/L (ref 0–0.2)
BASOPHILS NFR BLD AUTO: 0.4 %
DIFFERENTIAL METHOD BLD: NORMAL
EOSINOPHIL # BLD AUTO: 0.2 10E9/L (ref 0–0.7)
EOSINOPHIL NFR BLD AUTO: 3 %
ERYTHROCYTE [DISTWIDTH] IN BLOOD BY AUTOMATED COUNT: 13.2 % (ref 10–15)
HCT VFR BLD AUTO: 38.4 % (ref 35–47)
HGB BLD-MCNC: 12.7 G/DL (ref 11.7–15.7)
LYMPHOCYTES # BLD AUTO: 1.7 10E9/L (ref 0.8–5.3)
LYMPHOCYTES NFR BLD AUTO: 34.7 %
MCH RBC QN AUTO: 30.2 PG (ref 26.5–33)
MCHC RBC AUTO-ENTMCNC: 33.1 G/DL (ref 31.5–36.5)
MCV RBC AUTO: 91 FL (ref 78–100)
MONOCYTES # BLD AUTO: 0.8 10E9/L (ref 0–1.3)
MONOCYTES NFR BLD AUTO: 16.2 %
NEUTROPHILS # BLD AUTO: 2.3 10E9/L (ref 1.6–8.3)
NEUTROPHILS NFR BLD AUTO: 45.7 %
PLATELET # BLD AUTO: 208 10E9/L (ref 150–450)
RBC # BLD AUTO: 4.21 10E12/L (ref 3.8–5.2)
WBC # BLD AUTO: 5 10E9/L (ref 4–11)

## 2021-06-23 PROCEDURE — 71046 X-RAY EXAM CHEST 2 VIEWS: CPT | Mod: TC | Performed by: RADIOLOGY

## 2021-06-23 PROCEDURE — 36415 COLL VENOUS BLD VENIPUNCTURE: CPT | Performed by: FAMILY MEDICINE

## 2021-06-23 PROCEDURE — 99214 OFFICE O/P EST MOD 30 MIN: CPT | Performed by: FAMILY MEDICINE

## 2021-06-23 PROCEDURE — 85025 COMPLETE CBC W/AUTO DIFF WBC: CPT | Performed by: FAMILY MEDICINE

## 2021-06-23 ASSESSMENT — PAIN SCALES - GENERAL: PAINLEVEL: MILD PAIN (3)

## 2021-06-23 NOTE — PROGRESS NOTES
"    Assessment & Plan     Acute recurrent maxillary sinusitis  Reviewed.  Likely bacterial given worsening at this point.  augmentin coverage.  F/u with ongoing concerns.      Pleuritic chest pain  Likely MSK in nature.  Xray and cbc stable.  Follow this with ongoing concerns.  Probably from coughing.  No infiltrate or other concenrs.    - CBC with platelets and differential  - XR CHEST 2 VW (Clinic Performed); Future             BMI:   Estimated body mass index is 33.73 kg/m  as calculated from the following:    Height as of 2/24/21: 1.676 m (5' 6\").    Weight as of this encounter: 94.8 kg (209 lb).           No follow-ups on file.    Cholo Adame MD  [facility]    Ana Metcalf is a 55 year old who presents for the following health issues     HPI     RESPIRATORY SYMPTOMS      Duration: 1 week    Description  nasal congestion, cough, ear pain right, headache, fatigue/malaise and myalgias, throat pain    Severity: moderate    Accompanying signs and symptoms: mouth sore    History (predisposing factors):  none    Precipitating or alleviating factors: None    Therapies tried and outcome:  Nyquil, robitussin, advil, cough drops           Review of Systems   Constitutional, HEENT, cardiovascular, pulmonary, gi and gu systems are negative, except as otherwise noted.      Objective    /78   Pulse 55   Temp 99.2  F (37.3  C)   Wt 94.8 kg (209 lb)   SpO2 97%   BMI 33.73 kg/m    Body mass index is 33.73 kg/m .  Physical Exam   GENERAL: healthy, alert and no distress  EYES: Eyes grossly normal to inspection, PERRL and conjunctivae and sclerae normal  HENT: ear canals and TM's normal, nose and mouth without ulcers or lesions  NECK: no adenopathy, no asymmetry, masses, or scars and thyroid normal to palpation  RESP: no rales , no rhonchi and expiratory wheezes bilateral  CV: regular rate and rhythm, normal S1 S2, no S3 or S4, no murmur, click or rub, no peripheral edema and " peripheral pulses strong  ABDOMEN: soft, nontender, no hepatosplenomegaly, no masses and bowel sounds normal  MS: no gross musculoskeletal defects noted, no edema    Xray - Reviewed and interpreted by me.  Stable cxr.    Cbc stable.

## 2021-06-23 NOTE — NURSING NOTE
"Chief Complaint   Patient presents with     URI       Initial /78   Pulse 55   Temp 99.2  F (37.3  C)   Wt 94.8 kg (209 lb)   SpO2 97%   BMI 33.73 kg/m   Estimated body mass index is 33.73 kg/m  as calculated from the following:    Height as of 2/24/21: 1.676 m (5' 6\").    Weight as of this encounter: 94.8 kg (209 lb).  Medication Reconciliation: complete  Cathy Greene LPN  "

## 2021-06-23 NOTE — TELEPHONE ENCOUNTER
Patient is at [facility] waiting for the Augmentin that was ordered for her at her appointment this morning she states.  I don't see that anything was ordered.

## 2021-07-19 NOTE — PROGRESS NOTES
"    Assessment & Plan     (M25.511) Acute pain of right shoulder  (primary encounter diagnosis)  Comment: check XRAY  Plan: XR Shoulder Right 2 Views            (M75.81) Rotator cuff tendonitis, right  Comment: treat with Prednisone. Supportive care discussed  Plan: predniSONE (DELTASONE) 20 MG tablet            (R20.0,  R20.2) Numbness and tingling in right hand  Comment: she would like EMG done by her neurologist Dr. Ellyn Mcmullen at the [facility] in [place], [place]  Plan: Adult Neurology Referral            (G56.01) Carpal tunnel syndrome of right wrist  Comment: she would like EMG done by her neurologist Dr. Ellyn Mcmullen at the [facility] in [place], [place]. Continue to wear wrist brace  Plan: Adult Neurology Referral       BMI:   Estimated body mass index is 34.22 kg/m  as calculated from the following:    Height as of 2/24/21: 1.676 m (5' 6\").    Weight as of this encounter: 96.2 kg (212 lb).   Weight management plan: Discussed healthy diet and exercise guidelines    See Patient Instructions    Return if symptoms worsen or fail to improve.    Jill Correa NP  [facility]    Ana Metcalf is a 55 year old who presents for the following health issues     HPI     Pain History:  When did you first notice your pain? - 1 to 6 weeks   Have you seen any provider previously for this issue? No  How has your pain affected your ability to work? Pain does not limit ability to work   What type of work do you or did you do? Works from home   Where in your body do you have pain? Musculoskeletal problem/pain  Onset/Duration: bothersome for a few years . Gotten worse in the last 1/6 weeks   Description  Location: hand and shoulder - right  Joint Swelling: no  Redness: no  Pain: YES  Warmth: no  Intensity:  moderate  Progression of Symptoms:  worsening  Accompanying signs and symptoms:   Fevers: no  Numbness/tingling/weakness: YES- her right hand " especailly. She has a hard time lifting up middle finger, hurts finger and raidiates up into wrist . When she wakes up both hands are numb.   History  Trauma to the area: no  Recent illness:  no  Previous similar problem: Yes, patient does have MS so she usually contributes pain to that.   Previous evaluation:  no  Precipitating or alleviating factors:  Aggravating factors include: lifting, exercise and overuse  Therapies tried and outcome: stretching, support wrap for carpal tunnel , acetaminophen and Ibuprofen    Left hand dominant  She does clerical work   Symptoms are worse in am  Denies injury or trauma to wrist      Review of Systems   Constitutional, HEENT, cardiovascular, pulmonary, GI, , musculoskeletal, neuro, skin, endocrine and psych systems are negative, except as otherwise noted.      Objective    /82 (BP Location: Right arm, Patient Position: Sitting, Cuff Size: Adult Regular)   Pulse 64   Temp 98.8  F (37.1  C) (Tympanic)   Wt 96.2 kg (212 lb)   SpO2 99%   BMI 34.22 kg/m    Body mass index is 34.22 kg/m .  Physical Exam   GENERAL: healthy, alert and no distress  NECK: no adenopathy, no asymmetry, masses, or scars and thyroid normal to palpation  RESP: lungs clear to auscultation - no rales, rhonchi or wheezes  CV: regular rate and rhythm, normal S1 S2, no S3 or S4, no murmur, click or rub, no peripheral edema and peripheral pulses strong  ABDOMEN: soft, nontender, no hepatosplenomegaly, no masses and bowel sounds normal  MS: no gross musculoskeletal defects noted, no edema. +CMS and ROM intact to upper extremities. TTP to right anterior AC region. She can overcome resistance to right upper extremity. +increased symptoms with abduction to right upper extremity. Right brachial reflex and radial pulse +2. Extension and flexion intact to all digits on right hand. +phalgen sign. -Tinel test. No rash, erythema, bruising, or warmth to the touch to bilateral upper extremities  SKIN: no  suspicious lesions or rashes  NEURO: Normal strength and tone, mentation intact and speech normal  PSYCH: mentation appears normal, affect normal/bright    Results for orders placed or performed in visit on 07/20/21   XR SHOULDER RIGHT G/E 2 VIEWS (Clinic Performed)     Status: None    Narrative    PROCEDURE: XR SHOULDER RIGHT G/E 3 VIEWS 7/20/2021 2:04 PM    HISTORY: Acute pain of right shoulder    COMPARISONS: None.    TECHNIQUE: 4 views.    FINDINGS: No acute fracture or dislocation is seen. Acromiohumeral  distance is normal. There is very mild degenerative change in the AC  joint.         Impression    IMPRESSION: No acute bony abnormality.    MATHEW MARTI MD         SYSTEM ID:  [id number]

## 2021-07-20 ENCOUNTER — OFFICE VISIT (OUTPATIENT)
Dept: FAMILY MEDICINE | Facility: OTHER | Age: 56
End: 2021-07-20
Attending: NURSE PRACTITIONER
Payer: COMMERCIAL

## 2021-07-20 ENCOUNTER — ANCILLARY PROCEDURE (OUTPATIENT)
Dept: GENERAL RADIOLOGY | Facility: OTHER | Age: 56
End: 2021-07-20
Attending: NURSE PRACTITIONER
Payer: COMMERCIAL

## 2021-07-20 VITALS
DIASTOLIC BLOOD PRESSURE: 82 MMHG | OXYGEN SATURATION: 99 % | TEMPERATURE: 98.8 F | BODY MASS INDEX: 34.22 KG/M2 | WEIGHT: 212 LBS | SYSTOLIC BLOOD PRESSURE: 110 MMHG | HEART RATE: 64 BPM

## 2021-07-20 DIAGNOSIS — R20.0 NUMBNESS AND TINGLING IN RIGHT HAND: ICD-10-CM

## 2021-07-20 DIAGNOSIS — G56.01 CARPAL TUNNEL SYNDROME OF RIGHT WRIST: ICD-10-CM

## 2021-07-20 DIAGNOSIS — M75.81 ROTATOR CUFF TENDONITIS, RIGHT: ICD-10-CM

## 2021-07-20 DIAGNOSIS — R20.2 NUMBNESS AND TINGLING IN RIGHT HAND: ICD-10-CM

## 2021-07-20 DIAGNOSIS — M25.511 ACUTE PAIN OF RIGHT SHOULDER: ICD-10-CM

## 2021-07-20 DIAGNOSIS — M25.511 ACUTE PAIN OF RIGHT SHOULDER: Primary | ICD-10-CM

## 2021-07-20 PROCEDURE — 99214 OFFICE O/P EST MOD 30 MIN: CPT | Performed by: NURSE PRACTITIONER

## 2021-07-20 PROCEDURE — 73030 X-RAY EXAM OF SHOULDER: CPT | Mod: TC | Performed by: RADIOLOGY

## 2021-07-20 RX ORDER — PREDNISONE 20 MG/1
TABLET ORAL
Qty: 10 TABLET | Refills: 0 | Status: SHIPPED | OUTPATIENT
Start: 2021-07-20 | End: 2021-12-22

## 2021-07-20 ASSESSMENT — PAIN SCALES - GENERAL: PAINLEVEL: MODERATE PAIN (4)

## 2021-07-23 NOTE — PATIENT INSTRUCTIONS
Patient Education     Carpal Tunnel Syndrome    Carpal tunnel syndrome is a painful condition of the wrist and arm. It is caused by pressure on the median nerve. The median nerve is one of the nerves that give feeling and movement to the hand. It passes through a tunnel in the wrist called the carpal tunnel. This tunnel is made up of bones and ligaments. Narrowing of this tunnel or swelling of the tissues inside the tunnel puts pressure on the median nerve. This causes numbness, pins and needles, or electric shooting pains in your hand and forearm. Often the pain is worse at night and may wake you when you are asleep.  Carpal tunnel syndrome may occur during pregnancy and with use of birth control pills. It is more common in workers who must often bend their wrists. It is also common in people who work with power tools that cause strong vibrations.  Home care    Rest the painful wrist. Avoid repeated bending of the wrist back and forth. This puts pressure on the median nerve. Avoid using power tools with strong vibrations.    If you were given a splint, wear it at night while you sleep. You may also wear it during the day for comfort.    Move your fingers and wrists often to prevent stiffness.    Elevate your arms on pillows when you lie down.    Try using the unaffected hand more.    Try not to hold your wrists in a bent, downward position.    Sometimes changes in the work place may ease symptoms. If you type most of the day, it may help to change the position of your keyboard or add a wrist support. Your wrist should be in a neutral position and not bent back when typing.    You may use over-the-counter pain medicine to treat pain and inflammation, unless another medicine was prescribed. Anti-inflammatory pain medicines, such as ibuprofen or naproxen may be more effective than acetaminophen, which treats pain, but not inflammation. If you have chronic liver or kidney disease or ever had a stomach ulcer or  gastrointestinal bleeding, talk with your healthcare provider before using these medicines.    Opioid pain medicine will only give temporary relief and does not treat the problem. If pain continues, you may need a shot of a steroid drug into your wrist.    If the above methods fail, you may need surgery. This will open the carpal tunnel and release the pressure on the trapped nerve.  Follow-up care  Follow up with your healthcare provider, or as advised. If X-rays were taken, you will be notified of any new findings that may affect your care.  When to seek medical advice  Call your healthcare provider right away if any of these occur:    Pain not improving with the above treatment    Fingers or hand become cold, blue, numb, or tingly    Your whole arm becomes swollen or weak  [employer] last reviewed this educational content on 5/1/2018 2000-2021 [employer]. All rights reserved. This information is not intended as a substitute for professional medical care. Always follow your healthcare professional's instructions.

## 2021-09-18 DIAGNOSIS — I10 ESSENTIAL HYPERTENSION, BENIGN: Chronic | ICD-10-CM

## 2021-09-20 RX ORDER — HYDROCHLOROTHIAZIDE 12.5 MG/1
CAPSULE ORAL
Qty: 60 CAPSULE | Refills: 2 | Status: SHIPPED | OUTPATIENT
Start: 2021-09-20 | End: 2022-04-04

## 2021-09-28 NOTE — PROGRESS NOTES
"    Assessment & Plan     Tea is frustrated and wants to know what is causing her neck and back pain. 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 She would like to be checked for psoriatic arthritis. I will look into testing needed and get back to her.     (Z12.31) Encounter for screening mammogram for breast cancer  (primary encounter diagnosis)  Comment: due for mammogram-ordered. The last mammo she had was a bad experience and painful so she has been holding off on getting another mammo. She is ok to give it a try again   Plan: MA Screen Bilateral w/[place]            (M54.2) Cervicalgia  Comment: ongoing neck pain. She wants to know the cause   Plan: update cervical MRI. She gets MRI's with her neurologist in [place]     (M54.5,  G89.29) Chronic bilateral low back pain without sciatica  Comment: ongoing back pain. She wants to know the cause   Plan: Update lumbar MRI. She gets MRI's with her neurologist in [place]       See Patient Instructions    Return if symptoms worsen or fail to improve.    Jill Correa NP  [facility]   Tea is a 55 year old who presents for the following health issues   HPI     Chronic/Recurring Back Pain Follow Up      Where is your back pain located? (Select all that apply) middle of back bilateral, upper back bilateral, neck bilateral and shoulders bilateral  The right side is worse.     How would you describe your back pain?  dull ache and stabbing    Where does your back pain spread? Nowhere, does get weakness in right leg    Since your last clinic visit for back pain, how has your pain changed? gradually worsening    Does your back pain interfere with your job? YES    Since your last visit, have you tried any new treatment? No      How many servings of fruits and vegetables do you eat daily?  2-3    On average, how many sweetened beverages do you drink each " "day (Examples: soda, juice, sweet tea, etc.  Do NOT count diet or artificially sweetened beverages)?   2    How many days per week do you exercise enough to make your heart beat faster? 3 or less    How many minutes a day do you exercise enough to make your heart beat faster? 30 - 60    How many days per week do you miss taking your medication? 0    This year has been the \"worst\" for her pain. She has been working from home. She reports her desk and computer is set up to be ergonomically correct.     Review of Systems   Constitutional, HEENT, cardiovascular, pulmonary, GI, , musculoskeletal, neuro, skin, endocrine and psych systems are negative, except as otherwise noted.      Objective    /68   Pulse 65   Temp 98.5  F (36.9  C) (Tympanic)   Resp 16   Wt 94.3 kg (208 lb)   SpO2 97%   BMI 33.57 kg/m    Body mass index is 33.57 kg/m .  Physical Exam   GENERAL: healthy, alert and no distress  NECK: no adenopathy, no asymmetry, masses, or scars and thyroid normal to palpation  RESP: lungs clear to auscultation - no rales, rhonchi or wheezes  CV: regular rate and rhythm, normal S1 S2, no S3 or S4, no murmur, click or rub, no peripheral edema and peripheral pulses strong  ABDOMEN: soft, nontender, no hepatosplenomegaly, no masses and bowel sounds normal  MS: no gross musculoskeletal defects noted, no edema. +generalized back tenderness. No step offs or TTP on spinal column. No rash, erythema, bruising, or warmth to the touch. Distal pulses intact   SKIN: no suspicious lesions or rashes. +fleshy color lesion to left shin and scaly skin with scarring to right elbow   NEURO: Normal strength and tone, mentation intact and speech normal  PSYCH: mentation appears normal, affect normal/bright      "

## 2021-09-29 ENCOUNTER — HOSPITAL ENCOUNTER (EMERGENCY)
Facility: OTHER | Age: 56
Discharge: HOME | End: 2021-09-29
Attending: EMERGENCY MEDICINE | Admitting: EMERGENCY MEDICINE
Payer: COMMERCIAL

## 2021-09-29 ENCOUNTER — APPOINTMENT (OUTPATIENT)
Dept: GENERAL RADIOLOGY | Facility: OTHER | Age: 56
End: 2021-09-29
Attending: EMERGENCY MEDICINE
Payer: COMMERCIAL

## 2021-09-29 VITALS
SYSTOLIC BLOOD PRESSURE: 154 MMHG | TEMPERATURE: 97.7 F | HEIGHT: 66 IN | DIASTOLIC BLOOD PRESSURE: 85 MMHG | BODY MASS INDEX: 33.27 KG/M2 | RESPIRATION RATE: 16 BRPM | OXYGEN SATURATION: 100 % | HEART RATE: 53 BPM | WEIGHT: 207 LBS

## 2021-09-29 DIAGNOSIS — M79.671 RIGHT FOOT PAIN: ICD-10-CM

## 2021-09-29 PROCEDURE — 99283 EMERGENCY DEPT VISIT LOW MDM: CPT | Performed by: EMERGENCY MEDICINE

## 2021-09-29 PROCEDURE — 99283 EMERGENCY DEPT VISIT LOW MDM: CPT | Mod: 25 | Performed by: EMERGENCY MEDICINE

## 2021-09-29 PROCEDURE — 73630 X-RAY EXAM OF FOOT: CPT | Mod: RT

## 2021-09-29 RX ORDER — TRAMADOL HYDROCHLORIDE 50 MG/1
50 TABLET ORAL EVERY 6 HOURS PRN
Qty: 10 TABLET | Refills: 0 | Status: SHIPPED | OUTPATIENT
Start: 2021-09-29 | End: 2021-10-02

## 2021-09-29 ASSESSMENT — ENCOUNTER SYMPTOMS
FEVER: 0
CHEST TIGHTNESS: 0
DYSURIA: 0
SHORTNESS OF BREATH: 0
NAUSEA: 0
ARTHRALGIAS: 1
VOMITING: 0
WOUND: 0
CHILLS: 0
LIGHT-HEADEDNESS: 0
AGITATION: 0

## 2021-09-29 ASSESSMENT — MIFFLIN-ST. JEOR: SCORE: 1550.7

## 2021-09-29 NOTE — ED TRIAGE NOTES
ED Nursing Triage Note (General)   ________________________________    Yvonne Llanos is a 55 year old Female that presents to triage private car  With history of  Pt states that she broke her right foot.  Pt reports pain for about a week and the pain has been getting worse.  Pt states it is now keeping her awake at night.  Pt denies bruising or swelling and does not remember any trauma to the foot reported by patient. CMS intact, but does have some tingling which is her baseline due to MS.  Significant symptoms had onset 1 week(s) ago.    Patient appears alert , in no acute distress., and cooperative behavior.    GCS Eye Opening = 4=Spontaneous  Airway: intact  Breathing noted as Normal  Circulation Normal  Skin:  Normal  Action taken:  Triage to critical care immediately      PRE HOSPITAL PRIOR LIVING SITUATION Spouse and Children

## 2021-09-29 NOTE — ED PROVIDER NOTES
History     Chief Complaint   Patient presents with     Foot Pain     HPI  Yvonne Llanos is a 55 year old female who is here with right foot pain.  She first noticed it perhaps a week ago and it is continually getting worse.  No injury that she can recall but she has been walking more as she was trying to get in better shape.  Pain is on the top of her foot and she points to the mid to distal fourth and fifth metatarsal area.  She has not noticed any swelling or redness.  She is able to walk if she puts weight more medially on her foot, but in the lateral weight on her foot is quite painful.  Also hurts to palpate this area.  Pain has been keeping her awake at night.  She also has MS, but has never had symptoms like this related to her MS.  Allergies:  Allergies   Allergen Reactions     Ace Inhibitors Cough     Amitriptyline Other (See Comments) and Nausea     Nightmares     Codeine      Headache and nausea      Flagyl [Metronidazole] Nausea     Morphine Nausea     Promethazine      nausea     Seasonal      Sulfa Drugs        Problem List:    Patient Active Problem List    Diagnosis Date Noted     KARRIE (obstructive sleep apnea)- severe (AHI 30)      Priority: Medium     Polysomnography 9/12/13 The apnea hypopnea index was 30 events per hour with desats down to 82 %.  CPAP 8 cm effective.        Migraines      Priority: Medium     Other fatigue 05/30/2019     Priority: Medium     Obesity 05/30/2019     Priority: Medium     Resistant hypertension 05/30/2019     Priority: Medium     Diastolic dysfunction grade 1 on 5/10/2019 05/20/2019     Priority: Medium     Subclinical hypothyroidism 04/29/2019     Priority: Medium     Hx of cardiac murmur 04/29/2019     Priority: Medium     Fibromyalgia 03/28/2018     Priority: Medium     MS (multiple sclerosis) (H) 05/04/2017     Priority: Medium     Rotator cuff tendonitis, left 02/01/2016     Priority: Medium     Essential hypertension, benign 08/28/2014     Priority: Medium      Status post bariatric surgery 2014     Priority: Medium     S/p gastric sleeve        Hyperlipidemia LDL goal <130 2013     Priority: Medium     Insulin resistance 2013     Priority: Medium     Tear film insufficiency 2013     Priority: Medium     Seasonal allergies 2011     Priority: Medium        Past Medical History:    Past Medical History:   Diagnosis Date     Endometriosis      Hypertensive urgency 2019     Other abnormal glucose 2011     Pancreatitis due to biliary obstruction        Past Surgical History:    Past Surgical History:   Procedure Laterality Date      SECTION        SECTION       CHOLECYSTECTOMY       HYSTERECTOMY TOTAL ABDOMINAL, BILATERAL SALPINGO-OOPHORECTOMY, COMBINED  2001    Endometriosis     LAPAROSCOPIC GASTRIC SLEEVE  2014    [place]     LAPAROSCOPY      D&C, exploratory,  placental tissue adhered to uterus     ORTHOPEDIC SURGERY      rotator cuff tendon surgery Left        Family History:    Family History   Problem Relation Age of Onset     Hypertension Mother      Heart Failure Mother         Congestive     Other - See Comments Mother         marcos danlos??  hypermobility     Lipids Father         Hyperlipidemia     Cancer Father         Skin     Hypertension Father      Prostate Cancer Father      Suicide Maternal Grandmother      Diabetes Maternal Grandfather      Clotting Disorder Maternal Grandfather      Diabetes Paternal Grandmother      Kidney Disease Paternal Grandmother      Other - See Comments Paternal Grandfather         old age     Depression Brother      Diabetes Brother         type 2     Myocardial Infarction Brother 32        ETOHic, +tobacco     Peripheral Vascular Disease Brother      Hyperlipidemia Brother      Other - See Comments Sister         Post Partum DVT     Other Cancer Sister         retinal tear     Known Genetic Syndrome Daughter         marcos kimballs     Known  "Genetic Syndrome Daughter         marcos mitchell       Social History:  Marital Status:   [2]  Social History     Tobacco Use     Smoking status: Never Smoker     Smokeless tobacco: Never Used   Substance Use Topics     Alcohol use: Not Currently     Alcohol/week: 0.0 standard drinks     Comment: rarely-3 drinks a year     Drug use: No        Medications:    traMADol (ULTRAM) 50 MG tablet  baclofen (LIORESAL) 10 MG tablet  butalbital-acetaminophen-caffeine (ESGIC) -40 MG tablet  carvedilol (COREG) 12.5 MG tablet  cholecalciferol (VITAMIN D3) 5000 UNITS CAPS capsule  diazepam (VALIUM) 5 MG tablet  folic acid-vit B6-vit B12 (FOLGARD) 0.8-10-0.115 MG TABS  gabapentin (NEURONTIN) 400 MG capsule  hydrochlorothiazide (MICROZIDE) 12.5 MG capsule  losartan (COZAAR) 100 MG tablet  montelukast (SINGULAIR) 10 MG tablet  Multiple Vitamins-Minerals (MULTIVITAMIN GUMMIES ADULT) CHEW  predniSONE (DELTASONE) 20 MG tablet  spironolactone (ALDACTONE) 25 MG tablet          Review of Systems   Constitutional: Negative for chills and fever.   HENT: Negative for congestion.    Eyes: Negative for visual disturbance.   Respiratory: Negative for chest tightness and shortness of breath.    Cardiovascular: Negative for chest pain.   Gastrointestinal: Negative for nausea and vomiting.   Genitourinary: Negative for dysuria.   Musculoskeletal: Positive for arthralgias.   Skin: Negative for rash and wound.   Neurological: Negative for light-headedness.   Psychiatric/Behavioral: Negative for agitation.       Physical Exam   BP: (!) 154/85  Pulse: 53  Temp: 97.7  F (36.5  C)  Resp: 16  Height: 167.6 cm (5' 6\")  Weight: 93.9 kg (207 lb)  SpO2: 100 %      Physical Exam  Vitals and nursing note reviewed.   Constitutional:       Appearance: Normal appearance.   HENT:      Head: Normocephalic and atraumatic.      Mouth/Throat:      Mouth: Mucous membranes are moist.   Eyes:      Conjunctiva/sclera: Conjunctivae normal.   Cardiovascular:      " Rate and Rhythm: Normal rate.   Pulmonary:      Effort: Pulmonary effort is normal.   Musculoskeletal:      Comments: No swelling or erythema or deformity to the right foot.  Strong tibialis posterior pulse.  She is tender over the lateral mid to distal metatarsals.  Neurovascularly intact.   Skin:     General: Skin is warm and dry.   Neurological:      Mental Status: She is alert and oriented to person, place, and time.   Psychiatric:         Behavior: Behavior normal.         ED Course        Procedures                Results for orders placed or performed during the hospital encounter of 09/29/21 (from the past 24 hour(s))   XR Foot Right G/E 3 Views    Narrative    PROCEDURE:  XR FOOT RIGHT G/E 3 VIEWS    HISTORY: pain top lateral mid foot.    COMPARISON:  None.    TECHNIQUE:  3 views right foot.    FINDINGS:  No fracture or dislocation is identified. Calcaneal  spurring is seen. The joint spaces are preserved. No foreign body is  seen.       Impression    IMPRESSION: No acute fracture.      RADHA HESS MD         SYSTEM ID:  [id number]       Medications - No data to display    Assessments & Plan (with Medical Decision Making)     I have reviewed the nursing notes.    I have reviewed the findings, diagnosis, plan and need for follow up with the patient.  X-ray reassuring as above.  Most likely soft tissue injury.  Did discuss the possibility that this could be related to her MS.  She does have an appointment scheduled with her primary provider tomorrow and she can discuss with them as well.  In the meantime she is offered a postop shoe and some tramadol so she can sleep a little better at night.    New Prescriptions    TRAMADOL (ULTRAM) 50 MG TABLET    Take 1 tablet (50 mg) by mouth every 6 hours as needed for severe pain       Final diagnoses:   Right foot pain       9/29/2021   [facility]     Bo Knox MD  09/29/21 [id number]

## 2021-09-30 ENCOUNTER — OFFICE VISIT (OUTPATIENT)
Dept: FAMILY MEDICINE | Facility: OTHER | Age: 56
End: 2021-09-30
Attending: NURSE PRACTITIONER
Payer: COMMERCIAL

## 2021-09-30 VITALS
SYSTOLIC BLOOD PRESSURE: 118 MMHG | HEART RATE: 65 BPM | OXYGEN SATURATION: 97 % | RESPIRATION RATE: 16 BRPM | DIASTOLIC BLOOD PRESSURE: 68 MMHG | BODY MASS INDEX: 33.57 KG/M2 | WEIGHT: 208 LBS | TEMPERATURE: 98.5 F

## 2021-09-30 DIAGNOSIS — M54.2 CERVICALGIA: ICD-10-CM

## 2021-09-30 DIAGNOSIS — Z12.31 ENCOUNTER FOR SCREENING MAMMOGRAM FOR BREAST CANCER: Primary | ICD-10-CM

## 2021-09-30 DIAGNOSIS — G89.29 CHRONIC BILATERAL LOW BACK PAIN WITHOUT SCIATICA: ICD-10-CM

## 2021-09-30 DIAGNOSIS — M54.50 CHRONIC BILATERAL LOW BACK PAIN WITHOUT SCIATICA: ICD-10-CM

## 2021-09-30 PROCEDURE — 99214 OFFICE O/P EST MOD 30 MIN: CPT | Performed by: NURSE PRACTITIONER

## 2021-09-30 ASSESSMENT — PAIN SCALES - GENERAL: PAINLEVEL: MILD PAIN (3)

## 2021-10-03 ENCOUNTER — HEALTH MAINTENANCE LETTER (OUTPATIENT)
Age: 56
End: 2021-10-03

## 2021-10-04 ENCOUNTER — ALLIED HEALTH/NURSE VISIT (OUTPATIENT)
Dept: FAMILY MEDICINE | Facility: OTHER | Age: 56
End: 2021-10-04
Attending: FAMILY MEDICINE
Payer: COMMERCIAL

## 2021-10-04 DIAGNOSIS — R19.7 DIARRHEA: ICD-10-CM

## 2021-10-04 DIAGNOSIS — R51.9 HEADACHE: Primary | ICD-10-CM

## 2021-10-04 DIAGNOSIS — R11.0 NAUSEA: ICD-10-CM

## 2021-10-04 PROCEDURE — C9803 HOPD COVID-19 SPEC COLLECT: HCPCS

## 2021-10-04 PROCEDURE — U0005 INFEC AGEN DETEC AMPLI PROBE: HCPCS | Mod: ZL

## 2021-10-04 NOTE — PATIENT INSTRUCTIONS
Patient Education     Possible Causes of Low Back or Leg Pain    BIG: The symptoms in your back or leg may be due to pressure on a nerve. This pressure may be caused by a damaged disk or by abnormal bone growth. Either way, you may feel pain, burning, tingling, or numbness. If you have pressure on a nerve that connects to the sciatic nerve, pain may shoot down your leg.    Pressure from the disk  Constant wear and tear can weaken a disk over time and cause back pain. The disk can then be damaged by a sudden movement or injury. If its soft center starts to bulge, the disk may press on a nerve. Or the outside of the disk may tear, and the soft center may squeeze through and pinch a nerve.    Pressure from bone  As a disk wears out, the vertebrae right above and below the disk start to touch. This can put pressure on a nerve. Often, abnormal bone (called bone spurs) grows where the vertebrae rub against each other. This can cause the foramen or the spinal canal to narrow (called stenosis) and press against a nerve.  [employer] last reviewed this educational content on 3/1/2018    0487-3556 [employer]. All rights reserved. This information is not intended as a substitute for professional medical care. Always follow your healthcare professional's instructions.

## 2021-10-05 DIAGNOSIS — M54.2 NECK PAIN: Primary | ICD-10-CM

## 2021-10-05 DIAGNOSIS — M79.10 GENERALIZED MUSCLE ACHE: ICD-10-CM

## 2021-10-05 NOTE — PROGRESS NOTES
Please call Tea and let her know that I have ordered testing for her concern of psoriatic arthritis. I would like her to have her labs done in the am. Please advise.

## 2021-10-06 LAB — SARS-COV-2 RNA RESP QL NAA+PROBE: NEGATIVE

## 2021-10-08 ENCOUNTER — MYC MEDICAL ADVICE (OUTPATIENT)
Dept: FAMILY MEDICINE | Facility: OTHER | Age: 56
End: 2021-10-08

## 2021-10-12 ENCOUNTER — LAB (OUTPATIENT)
Dept: LAB | Facility: OTHER | Age: 56
End: 2021-10-12
Payer: COMMERCIAL

## 2021-10-12 DIAGNOSIS — M79.10 GENERALIZED MUSCLE ACHE: ICD-10-CM

## 2021-10-12 DIAGNOSIS — M54.2 NECK PAIN: ICD-10-CM

## 2021-10-12 LAB
ALBUMIN SERPL-MCNC: 3.9 G/DL (ref 3.4–5)
ALBUMIN UR-MCNC: NEGATIVE MG/DL
ALP SERPL-CCNC: 89 U/L (ref 40–150)
ALT SERPL W P-5'-P-CCNC: 19 U/L (ref 0–50)
ANION GAP SERPL CALCULATED.3IONS-SCNC: 6 MMOL/L (ref 3–14)
APPEARANCE UR: CLEAR
AST SERPL W P-5'-P-CCNC: 12 U/L (ref 0–45)
BASOPHILS # BLD AUTO: 0 10E3/UL (ref 0–0.2)
BASOPHILS NFR BLD AUTO: 1 %
BILIRUB SERPL-MCNC: 0.3 MG/DL (ref 0.2–1.3)
BILIRUB UR QL STRIP: NEGATIVE
BUN SERPL-MCNC: 19 MG/DL (ref 7–30)
CALCIUM SERPL-MCNC: 9.3 MG/DL (ref 8.5–10.1)
CHLORIDE BLD-SCNC: 105 MMOL/L (ref 94–109)
CO2 SERPL-SCNC: 28 MMOL/L (ref 20–32)
COLOR UR AUTO: YELLOW
CREAT SERPL-MCNC: 0.8 MG/DL (ref 0.52–1.04)
CRP SERPL-MCNC: 9.6 MG/L (ref 0–8)
EOSINOPHIL # BLD AUTO: 0.1 10E3/UL (ref 0–0.7)
EOSINOPHIL NFR BLD AUTO: 2 %
ERYTHROCYTE [DISTWIDTH] IN BLOOD BY AUTOMATED COUNT: 13.4 % (ref 10–15)
ERYTHROCYTE [SEDIMENTATION RATE] IN BLOOD BY WESTERGREN METHOD: 25 MM/HR (ref 0–30)
GFR SERPL CREATININE-BSD FRML MDRD: 83 ML/MIN/1.73M2
GLUCOSE BLD-MCNC: 96 MG/DL (ref 70–99)
GLUCOSE UR STRIP-MCNC: NEGATIVE MG/DL
HCT VFR BLD AUTO: 39.1 % (ref 35–47)
HGB BLD-MCNC: 13.1 G/DL (ref 11.7–15.7)
HGB UR QL STRIP: NEGATIVE
KETONES UR STRIP-MCNC: NEGATIVE MG/DL
LEUKOCYTE ESTERASE UR QL STRIP: NEGATIVE
LYMPHOCYTES # BLD AUTO: 2.1 10E3/UL (ref 0.8–5.3)
LYMPHOCYTES NFR BLD AUTO: 34 %
MCH RBC QN AUTO: 30.1 PG (ref 26.5–33)
MCHC RBC AUTO-ENTMCNC: 33.5 G/DL (ref 31.5–36.5)
MCV RBC AUTO: 90 FL (ref 78–100)
MONOCYTES # BLD AUTO: 0.5 10E3/UL (ref 0–1.3)
MONOCYTES NFR BLD AUTO: 9 %
NEUTROPHILS # BLD AUTO: 3.4 10E3/UL (ref 1.6–8.3)
NEUTROPHILS NFR BLD AUTO: 55 %
NITRATE UR QL: NEGATIVE
PH UR STRIP: 6 [PH] (ref 5–7)
PLATELET # BLD AUTO: 247 10E3/UL (ref 150–450)
POTASSIUM BLD-SCNC: 4 MMOL/L (ref 3.4–5.3)
PROT SERPL-MCNC: 7.3 G/DL (ref 6.8–8.8)
RBC # BLD AUTO: 4.35 10E6/UL (ref 3.8–5.2)
SODIUM SERPL-SCNC: 139 MMOL/L (ref 133–144)
SP GR UR STRIP: 1.02 (ref 1–1.03)
URATE SERPL-MCNC: 6.1 MG/DL (ref 2.6–6)
UROBILINOGEN UR STRIP-ACNC: 0.2 E.U./DL
WBC # BLD AUTO: 6.2 10E3/UL (ref 4–11)

## 2021-10-12 PROCEDURE — 85025 COMPLETE CBC W/AUTO DIFF WBC: CPT

## 2021-10-12 PROCEDURE — 80053 COMPREHEN METABOLIC PANEL: CPT

## 2021-10-12 PROCEDURE — 86200 CCP ANTIBODY: CPT

## 2021-10-12 PROCEDURE — 85652 RBC SED RATE AUTOMATED: CPT

## 2021-10-12 PROCEDURE — 84550 ASSAY OF BLOOD/URIC ACID: CPT

## 2021-10-12 PROCEDURE — 86431 RHEUMATOID FACTOR QUANT: CPT

## 2021-10-12 PROCEDURE — 86140 C-REACTIVE PROTEIN: CPT

## 2021-10-12 PROCEDURE — 36415 COLL VENOUS BLD VENIPUNCTURE: CPT

## 2021-10-12 PROCEDURE — 81374 HLA I TYPING 1 ANTIGEN LR: CPT

## 2021-10-12 PROCEDURE — 86038 ANTINUCLEAR ANTIBODIES: CPT

## 2021-10-12 PROCEDURE — 81003 URINALYSIS AUTO W/O SCOPE: CPT

## 2021-10-14 LAB
ANA SER QL IF: NEGATIVE
CCP AB SER IA-ACNC: 0.9 U/ML
RHEUMATOID FACT SER NEPH-ACNC: 9 IU/ML

## 2021-10-18 LAB
B LOCUS: NORMAL
B27TEST METHOD: NORMAL

## 2021-10-25 ENCOUNTER — TRANSFERRED RECORDS (OUTPATIENT)
Dept: HEALTH INFORMATION MANAGEMENT | Facility: CLINIC | Age: 56
End: 2021-10-25
Payer: COMMERCIAL

## 2021-10-26 ENCOUNTER — ANCILLARY PROCEDURE (OUTPATIENT)
Dept: MAMMOGRAPHY | Facility: OTHER | Age: 56
End: 2021-10-26
Attending: NURSE PRACTITIONER
Payer: COMMERCIAL

## 2021-10-26 ENCOUNTER — TELEPHONE (OUTPATIENT)
Dept: GENERAL RADIOLOGY | Facility: HOSPITAL | Age: 56
End: 2021-10-26

## 2021-10-26 DIAGNOSIS — Z12.31 ENCOUNTER FOR SCREENING MAMMOGRAM FOR BREAST CANCER: ICD-10-CM

## 2021-10-26 PROCEDURE — 77067 SCR MAMMO BI INCL CAD: CPT | Mod: TC | Performed by: RADIOLOGY

## 2021-10-26 PROCEDURE — 77063 BREAST TOMOSYNTHESIS BI: CPT | Mod: TC | Performed by: RADIOLOGY

## 2021-10-31 ENCOUNTER — MYC MEDICAL ADVICE (OUTPATIENT)
Dept: FAMILY MEDICINE | Facility: OTHER | Age: 56
End: 2021-10-31

## 2021-10-31 DIAGNOSIS — G56.01 CARPAL TUNNEL SYNDROME OF RIGHT WRIST: Primary | ICD-10-CM

## 2021-11-05 ENCOUNTER — MYC MEDICAL ADVICE (OUTPATIENT)
Dept: FAMILY MEDICINE | Facility: OTHER | Age: 56
End: 2021-11-05
Payer: COMMERCIAL

## 2021-12-07 ENCOUNTER — TRANSFERRED RECORDS (OUTPATIENT)
Dept: HEALTH INFORMATION MANAGEMENT | Facility: CLINIC | Age: 56
End: 2021-12-07
Payer: COMMERCIAL

## 2021-12-21 NOTE — H&P (VIEW-ONLY)
[facility]  [address]  [place] [address]  Phone: [phone]  Fax: [phone]  Primary Provider: Cholo Adame  Pre-op Performing Provider: AMBIKA BLOOD      PREOPERATIVE EVALUATION:  Today's date: 12/22/2021    Yvonne Llanos is a 56 year old female who presents for a preoperative evaluation.    Surgical Information:  Surgery/Procedure: Right Carpal Tunnel Release  Surgery Location: [facility]  Surgeon: Dr. Cespedes  Surgery Date: 1-  Time of Surgery: unknown  Where patient plans to recover: At home with family  Fax number for surgical facility: Note does not need to be faxed, will be available electronically in Epic.    Type of Anesthesia Anticipated: General    Assessment & Plan     The proposed surgical procedure is considered INTERMEDIATE risk.    1. Preop general physical exam    2. Carpal tunnel syndrome of right wrist    3. MS (multiple sclerosis) (H)    4. KARRIE (obstructive sleep apnea)- severe (AHI 30)    5. Hyperlipidemia LDL goal <130    6. Insulin resistance    7. Subclinical hypothyroidism    8. Diastolic dysfunction grade 1 on 5/10/2019    9. Essential hypertension, benign    10. Hx of cardiac murmur      Preop COVID testing ordered and will be completed 01/10/2021. Labs and EKG stable.     Risks and Recommendations:  The patient has the following additional risks and recommendations for perioperative complications:   - No identified additional risk factors other than previously addressed    Medication Instructions:   - ACE/ARB: May be continued on the day of surgery.    - Calcium Channel Blockers: May be continued on the day of surgery.   - Diuretics: May continue due to heart failure.    RECOMMENDATION:  APPROVAL GIVEN to proceed with proposed procedure, without further diagnostic evaluation.    Ambika Blood PA-C on 12/21/2021 at 7:55 AM      Subjective     HPI related to upcoming procedure:   History of right wrist pain. Diagnosed  with carpal tunnel syndrome. Met with orthopedics who recommends proceeding forward with right carpal tunnel release.     Preop Questions 12/22/2021   1. Have you ever had a heart attack or stroke? No   2. Have you ever had surgery on your heart or blood vessels, such as a stent placement, a coronary artery bypass, or surgery on an artery in your head, neck, heart, or legs? No   3. Do you have chest pain with activity? No   4. Do you have a history of  heart failure? No   5. Do you currently have a cold, bronchitis or symptoms of other infection? No   6. Do you have a cough, shortness of breath, or wheezing? No   7. Do you or anyone in your family have previous history of blood clots? No   8. Do you or does anyone in your family have a serious bleeding problem such as prolonged bleeding following surgeries or cuts? No   9. Have you ever had problems with anemia or been told to take iron pills? No   10. Have you had any abnormal blood loss such as black, tarry or bloody stools, or abnormal vaginal bleeding? No   11. Have you ever had a blood transfusion? No   12. Are you willing to have a blood transfusion if it is medically needed before, during, or after your surgery? Yes   13. Have you or any of your relatives ever had problems with anesthesia? No   14. Do you have sleep apnea, excessive snoring or daytime drowsiness? No   15. Do you have any artifical heart valves or other implanted medical devices like a pacemaker, defibrillator, or continuous glucose monitor? No   16. Do you have artificial joints? No   17. Are you allergic to latex? No   18. Is there any chance that you may be pregnant? No       Health Care Directive:  Patient does not have a Health Care Directive or Living Will: Discussed advance care planning with patient; however, patient declined at this time.    Preoperative Review of :   reviewed - controlled substances reflected in medication list.      Status of Chronic Conditions:  CHF - Patient  has a longstanding history of diastolic heart dysfunction. The patient denies chest pain, edema, orthopnea, SOB or recent weight gain. Last Echocardiogram 05/2019, EKG 4/2019.     DIABETES - Patient has a longstanding history of insulin resistance syndrome. Patient is being treated with diet. Control has been unable to assess. Complicating factors include but are not limited to: hypertension and hyperlipidemia.     HYPERLIPIDEMIA - Patient has a long history of significant Hyperlipidemia with recent fair control. Managing with healthy lifestyle.     HYPERTENSION - Patient has longstanding history of HTN , currently denies any symptoms referable to elevated blood pressure. Specifically denies chest pain, palpitations, dyspnea, orthopnea, PND or peripheral edema. Blood pressure readings have been in normal range. Current medication regimen is as listed below. Patient denies any side effects of medication.     HYPOTHYROIDISM - Patient has a longstanding history of chronic Hypothyroidism. Patient has been doing well, noting no tremor, insomnia, hair loss or changes in skin texture. Continues to take medications as directed, without adverse reactions or side effects. Last TSH   Lab Results   Component Value Date    TSH 5.63 (H) 12/15/2020   .      SLEEP PROBLEM - Patient has a longstanding history of sleep apnea. Bariatric surgery improved symptoms. Repeat sleep study showed resolution.     MS: Stable.     Review of Systems  CONSTITUTIONAL: NEGATIVE for fever, chills, change in weight  INTEGUMENTARY/SKIN: NEGATIVE for worrisome rashes, moles or lesions  EYES: NEGATIVE for vision changes or irritation  ENT/MOUTH: NEGATIVE for ear, mouth and throat problems  RESP: NEGATIVE for significant cough or SOB  CV: NEGATIVE for chest pain, palpitations or peripheral edema  GI: NEGATIVE for nausea, abdominal pain, heartburn, or change in bowel habits  : NEGATIVE for frequency, dysuria, or hematuria  MUSCULOSKELETAL:POSITIVE  for  right wrist pain  NEURO: NEGATIVE for weakness, dizziness or paresthesias  ENDOCRINE: NEGATIVE for temperature intolerance, skin/hair changes  HEME: NEGATIVE for bleeding problems  PSYCHIATRIC: NEGATIVE for changes in mood or affect    Patient Active Problem List    Diagnosis Date Noted     KARRIE (obstructive sleep apnea)- severe (AHI 30)      Priority: Medium     Polysomnography 13 The apnea hypopnea index was 30 events per hour with desats down to 82 %.  CPAP 8 cm effective.        Migraines      Priority: Medium     Other fatigue 2019     Priority: Medium     Obesity 2019     Priority: Medium     Resistant hypertension 2019     Priority: Medium     Diastolic dysfunction grade 1 on 5/10/2019 2019     Priority: Medium     Subclinical hypothyroidism 2019     Priority: Medium     Hx of cardiac murmur 2019     Priority: Medium     Fibromyalgia 2018     Priority: Medium     MS (multiple sclerosis) (H) 2017     Priority: Medium     Rotator cuff tendonitis, left 2016     Priority: Medium     Essential hypertension, benign 2014     Priority: Medium     Status post bariatric surgery 2014     Priority: Medium     S/p gastric sleeve        Hyperlipidemia LDL goal <130 2013     Priority: Medium     Insulin resistance 2013     Priority: Medium     Tear film insufficiency 2013     Priority: Medium     Blepharitis 2012     Priority: Medium     Formatting of this note might be different from the original.  IMO Update       Seasonal allergies 2011     Priority: Medium      Past Medical History:   Diagnosis Date     Endometriosis      Hypertensive urgency 2019     Other abnormal glucose 2011     Pancreatitis due to biliary obstruction     improved after cholecystectomy     Past Surgical History:   Procedure Laterality Date      SECTION        SECTION  1996     CHOLECYSTECTOMY       HYSTERECTOMY  TOTAL ABDOMINAL, BILATERAL SALPINGO-OOPHORECTOMY, COMBINED  01/01/2001    Endometriosis     LAPAROSCOPIC GASTRIC SLEEVE  1/2014    [place]     LAPAROSCOPY  1997    D&C, exploratory,  placental tissue adhered to uterus     ORTHOPEDIC SURGERY  2015    rotator cuff tendon surgery Left      Current Outpatient Medications   Medication Sig Dispense Refill     baclofen (LIORESAL) 10 MG tablet Take 10 mg by mouth 4 times daily  3     butalbital-acetaminophen-caffeine (ESGIC) -40 MG tablet TAKE 1 TABLET BY MOUTH 3 TIMES WEEKLY AS NEEDED FOR HEADACHE 36 tablet 0     carvedilol (COREG) 12.5 MG tablet TAKE 1 TABLET BY MOUTH TWICE DAILY WITH MEALS 180 tablet 3     cholecalciferol (VITAMIN D3) 5000 UNITS CAPS capsule Take 1 capsule (5,000 Units) by mouth daily  0     diazepam (VALIUM) 5 MG tablet TK 1 T Q 8-12 H PRN FOR MSP  5     folic acid-vit B6-vit B12 (FOLGARD) 0.8-10-0.115 MG TABS Take 1 tablet by mouth daily       gabapentin (NEURONTIN) 400 MG capsule TAKE ONE CAPSULE BY MOUTH THREE TIMES DAILY (Patient taking differently: Take 400 mg by mouth 4 times daily ) 360 capsule 3     hydrochlorothiazide (MICROZIDE) 12.5 MG capsule TAKE 1 CAPSULE BY MOUTH TWICE DAILY 60 capsule 2     losartan (COZAAR) 100 MG tablet TAKE 1 TABLET BY MOUTH EVERY DAY 90 tablet 3     montelukast (SINGULAIR) 10 MG tablet TAKE 1 TABLET BY MOUTH EVERY EVENING 30 tablet 5     Multiple Vitamins-Minerals (MULTIVITAMIN GUMMIES ADULT) CHEW Take 2 tablets by mouth daily 30 tablet 0     spironolactone (ALDACTONE) 25 MG tablet TAKE 1 TABLET(25 MG) BY MOUTH DAILY 90 tablet 3       Allergies   Allergen Reactions     Ace Inhibitors Cough     Amitriptyline Other (See Comments) and Nausea     Nightmares     Codeine      Headache and nausea      Flagyl [Metronidazole] Nausea     Morphine Nausea     Promethazine      nausea     Seasonal      Sulfa Drugs         Social History     Tobacco Use     Smoking status: Never Smoker     Smokeless tobacco: Never Used  "  Substance Use Topics     Alcohol use: Not Currently     Alcohol/week: 0.0 standard drinks     Comment: rarely-3 drinks a year     Family History   Problem Relation Age of Onset     Hypertension Mother      Heart Failure Mother         Congestive     Other - See Comments Mother         marcos danlos??  hypermobility     Lipids Father         Hyperlipidemia     Cancer Father         Skin     Hypertension Father      Prostate Cancer Father      Suicide Maternal Grandmother      Diabetes Maternal Grandfather      Clotting Disorder Maternal Grandfather      Diabetes Paternal Grandmother      Kidney Disease Paternal Grandmother      Other - See Comments Paternal Grandfather         old age     Depression Brother      Diabetes Brother         type 2     Myocardial Infarction Brother 32        ETOHic, +tobacco     Peripheral Vascular Disease Brother      Hyperlipidemia Brother      Other - See Comments Sister         Post Partum DVT     Other Cancer Sister         retinal tear     Known Genetic Syndrome Daughter         marcos danlos     Known Genetic Syndrome Daughter         marcos danlos     History   Drug Use No         Objective     /74   Pulse 63   Temp 98.9  F (37.2  C)   Resp 12   Ht 1.676 m (5' 6\")   Wt 94.5 kg (208 lb 6.4 oz)   SpO2 97%   Breastfeeding No   BMI 33.64 kg/m      Physical Exam    GENERAL APPEARANCE: healthy, alert and no distress     EYES: EOMI, PERRL     HENT: ear canals and TM's normal and nose and mouth without ulcers or lesions     NECK: no adenopathy, no asymmetry, masses, or scars and thyroid normal to palpation     RESP: lungs clear to auscultation - no rales, rhonchi or wheezes     CV: regular rates and rhythm, normal S1 S2, no S3 or S4 and no murmur, click or rub     ABDOMEN:  soft, nontender, no HSM or masses and bowel sounds normal     MS: extremities normal- no gross deformities noted, no evidence of inflammation in joints, FROM in all extremities.     SKIN: no suspicious " lesions or rashes     NEURO: Normal strength and tone, sensory exam grossly normal, mentation intact and speech normal     PSYCH: mentation appears normal. and affect normal/bright     LYMPHATICS: No cervical adenopathy    Recent Labs   Lab Test 10/12/21  0818 06/23/21  0940 12/15/20  0935   HGB 13.1 12.7 12.2    208 268     --  139   POTASSIUM 4.0  --  3.9   CR 0.80  --  0.80        Diagnostics:  Labs pending at this time.  Results will be reviewed when available.   EKG: sinus bradycardia, normal axis, normal intervals, no acute ST/T changes c/w ischemia, possible LVH     Results for orders placed or performed in visit on 12/22/21   Basic Metabolic Panel     Status: Normal   Result Value Ref Range    Sodium 139 134 - 144 mmol/L    Potassium 4.2 3.5 - 5.1 mmol/L    Chloride 101 98 - 107 mmol/L    Carbon Dioxide (CO2) 31 21 - 31 mmol/L    Anion Gap 7 3 - 14 mmol/L    Urea Nitrogen 16 7 - 25 mg/dL    Creatinine 0.89 0.60 - 1.20 mg/dL    Calcium 9.9 8.6 - 10.3 mg/dL    Glucose 79 70 - 105 mg/dL    GFR Estimate 76 >60 mL/min/1.73m2   Hemoglobin     Status: Normal   Result Value Ref Range    Hemoglobin 12.5 11.7 - 15.7 g/dL   EKG 12-lead, tracing only     Status: None   Result Value Ref Range    Systolic Blood Pressure  mmHg    Diastolic Blood Pressure  mmHg    Ventricular Rate 54 BPM    Atrial Rate 54 BPM    KY Interval 164 ms    QRS Duration 78 ms     ms    QTc 421 ms    P Axis 47 degrees    R AXIS 1 degrees    T Axis 51 degrees    Interpretation ECG       Sinus bradycardia  Moderate voltage criteria for LVH, may be normal variant  Borderline ECG  No previous ECGs available  Confirmed by DO TAVAREZ STACY ([id number]) on 12/23/2021 6:14:52 AM           Revised Cardiac Risk Index (RCRI):  The patient has the following serious cardiovascular risks for perioperative complications:   - No serious cardiac risks = 0 points     RCRI Interpretation: 0 points: Class I (very low risk - 0.4% complication rate)            Signed Electronically by: Kandy Blood PA-C  Copy of this evaluation report is provided to requesting physician.

## 2021-12-21 NOTE — PROGRESS NOTES
[facility]  [address]  [place] [address]  Phone: [phone]  Fax: [phone]  Primary Provider: Cholo Adame  Pre-op Performing Provider: AMBIKA BLOOD      PREOPERATIVE EVALUATION:  Today's date: 12/22/2021    Yvonne Llanos is a 56 year old female who presents for a preoperative evaluation.    Surgical Information:  Surgery/Procedure: Right Carpal Tunnel Release  Surgery Location: [facility]  Surgeon: Dr. Cespedes  Surgery Date: 1-  Time of Surgery: unknown  Where patient plans to recover: At home with family  Fax number for surgical facility: Note does not need to be faxed, will be available electronically in Epic.    Type of Anesthesia Anticipated: General    Assessment & Plan     The proposed surgical procedure is considered INTERMEDIATE risk.    1. Preop general physical exam    2. Carpal tunnel syndrome of right wrist    3. MS (multiple sclerosis) (H)    4. KARRIE (obstructive sleep apnea)- severe (AHI 30)    5. Hyperlipidemia LDL goal <130    6. Insulin resistance    7. Subclinical hypothyroidism    8. Diastolic dysfunction grade 1 on 5/10/2019    9. Essential hypertension, benign    10. Hx of cardiac murmur      Preop COVID testing ordered and will be completed 01/10/2021. Labs and EKG stable.     Risks and Recommendations:  The patient has the following additional risks and recommendations for perioperative complications:   - No identified additional risk factors other than previously addressed    Medication Instructions:   - ACE/ARB: May be continued on the day of surgery.    - Calcium Channel Blockers: May be continued on the day of surgery.   - Diuretics: May continue due to heart failure.    RECOMMENDATION:  APPROVAL GIVEN to proceed with proposed procedure, without further diagnostic evaluation.    Ambika Blood PA-C on 12/21/2021 at 7:55 AM      Subjective     HPI related to upcoming procedure:   History of right wrist pain. Diagnosed  with carpal tunnel syndrome. Met with orthopedics who recommends proceeding forward with right carpal tunnel release.     Preop Questions 12/22/2021   1. Have you ever had a heart attack or stroke? No   2. Have you ever had surgery on your heart or blood vessels, such as a stent placement, a coronary artery bypass, or surgery on an artery in your head, neck, heart, or legs? No   3. Do you have chest pain with activity? No   4. Do you have a history of  heart failure? No   5. Do you currently have a cold, bronchitis or symptoms of other infection? No   6. Do you have a cough, shortness of breath, or wheezing? No   7. Do you or anyone in your family have previous history of blood clots? No   8. Do you or does anyone in your family have a serious bleeding problem such as prolonged bleeding following surgeries or cuts? No   9. Have you ever had problems with anemia or been told to take iron pills? No   10. Have you had any abnormal blood loss such as black, tarry or bloody stools, or abnormal vaginal bleeding? No   11. Have you ever had a blood transfusion? No   12. Are you willing to have a blood transfusion if it is medically needed before, during, or after your surgery? Yes   13. Have you or any of your relatives ever had problems with anesthesia? No   14. Do you have sleep apnea, excessive snoring or daytime drowsiness? No   15. Do you have any artifical heart valves or other implanted medical devices like a pacemaker, defibrillator, or continuous glucose monitor? No   16. Do you have artificial joints? No   17. Are you allergic to latex? No   18. Is there any chance that you may be pregnant? No       Health Care Directive:  Patient does not have a Health Care Directive or Living Will: Discussed advance care planning with patient; however, patient declined at this time.    Preoperative Review of :   reviewed - controlled substances reflected in medication list.      Status of Chronic Conditions:  CHF - Patient  has a longstanding history of diastolic heart dysfunction. The patient denies chest pain, edema, orthopnea, SOB or recent weight gain. Last Echocardiogram 05/2019, EKG 4/2019.     DIABETES - Patient has a longstanding history of insulin resistance syndrome. Patient is being treated with diet. Control has been unable to assess. Complicating factors include but are not limited to: hypertension and hyperlipidemia.     HYPERLIPIDEMIA - Patient has a long history of significant Hyperlipidemia with recent fair control. Managing with healthy lifestyle.     HYPERTENSION - Patient has longstanding history of HTN , currently denies any symptoms referable to elevated blood pressure. Specifically denies chest pain, palpitations, dyspnea, orthopnea, PND or peripheral edema. Blood pressure readings have been in normal range. Current medication regimen is as listed below. Patient denies any side effects of medication.     HYPOTHYROIDISM - Patient has a longstanding history of chronic Hypothyroidism. Patient has been doing well, noting no tremor, insomnia, hair loss or changes in skin texture. Continues to take medications as directed, without adverse reactions or side effects. Last TSH   Lab Results   Component Value Date    TSH 5.63 (H) 12/15/2020   .      SLEEP PROBLEM - Patient has a longstanding history of sleep apnea. Bariatric surgery improved symptoms. Repeat sleep study showed resolution.     MS: Stable.     Review of Systems  CONSTITUTIONAL: NEGATIVE for fever, chills, change in weight  INTEGUMENTARY/SKIN: NEGATIVE for worrisome rashes, moles or lesions  EYES: NEGATIVE for vision changes or irritation  ENT/MOUTH: NEGATIVE for ear, mouth and throat problems  RESP: NEGATIVE for significant cough or SOB  CV: NEGATIVE for chest pain, palpitations or peripheral edema  GI: NEGATIVE for nausea, abdominal pain, heartburn, or change in bowel habits  : NEGATIVE for frequency, dysuria, or hematuria  MUSCULOSKELETAL:POSITIVE  for  right wrist pain  NEURO: NEGATIVE for weakness, dizziness or paresthesias  ENDOCRINE: NEGATIVE for temperature intolerance, skin/hair changes  HEME: NEGATIVE for bleeding problems  PSYCHIATRIC: NEGATIVE for changes in mood or affect    Patient Active Problem List    Diagnosis Date Noted     KARRIE (obstructive sleep apnea)- severe (AHI 30)      Priority: Medium     Polysomnography 13 The apnea hypopnea index was 30 events per hour with desats down to 82 %.  CPAP 8 cm effective.        Migraines      Priority: Medium     Other fatigue 2019     Priority: Medium     Obesity 2019     Priority: Medium     Resistant hypertension 2019     Priority: Medium     Diastolic dysfunction grade 1 on 5/10/2019 2019     Priority: Medium     Subclinical hypothyroidism 2019     Priority: Medium     Hx of cardiac murmur 2019     Priority: Medium     Fibromyalgia 2018     Priority: Medium     MS (multiple sclerosis) (H) 2017     Priority: Medium     Rotator cuff tendonitis, left 2016     Priority: Medium     Essential hypertension, benign 2014     Priority: Medium     Status post bariatric surgery 2014     Priority: Medium     S/p gastric sleeve        Hyperlipidemia LDL goal <130 2013     Priority: Medium     Insulin resistance 2013     Priority: Medium     Tear film insufficiency 2013     Priority: Medium     Blepharitis 2012     Priority: Medium     Formatting of this note might be different from the original.  IMO Update       Seasonal allergies 2011     Priority: Medium      Past Medical History:   Diagnosis Date     Endometriosis      Hypertensive urgency 2019     Other abnormal glucose 2011     Pancreatitis due to biliary obstruction     improved after cholecystectomy     Past Surgical History:   Procedure Laterality Date      SECTION        SECTION  1996     CHOLECYSTECTOMY       HYSTERECTOMY  TOTAL ABDOMINAL, BILATERAL SALPINGO-OOPHORECTOMY, COMBINED  01/01/2001    Endometriosis     LAPAROSCOPIC GASTRIC SLEEVE  1/2014    [place]     LAPAROSCOPY  1997    D&C, exploratory,  placental tissue adhered to uterus     ORTHOPEDIC SURGERY  2015    rotator cuff tendon surgery Left      Current Outpatient Medications   Medication Sig Dispense Refill     baclofen (LIORESAL) 10 MG tablet Take 10 mg by mouth 4 times daily  3     butalbital-acetaminophen-caffeine (ESGIC) -40 MG tablet TAKE 1 TABLET BY MOUTH 3 TIMES WEEKLY AS NEEDED FOR HEADACHE 36 tablet 0     carvedilol (COREG) 12.5 MG tablet TAKE 1 TABLET BY MOUTH TWICE DAILY WITH MEALS 180 tablet 3     cholecalciferol (VITAMIN D3) 5000 UNITS CAPS capsule Take 1 capsule (5,000 Units) by mouth daily  0     diazepam (VALIUM) 5 MG tablet TK 1 T Q 8-12 H PRN FOR MSP  5     folic acid-vit B6-vit B12 (FOLGARD) 0.8-10-0.115 MG TABS Take 1 tablet by mouth daily       gabapentin (NEURONTIN) 400 MG capsule TAKE ONE CAPSULE BY MOUTH THREE TIMES DAILY (Patient taking differently: Take 400 mg by mouth 4 times daily ) 360 capsule 3     hydrochlorothiazide (MICROZIDE) 12.5 MG capsule TAKE 1 CAPSULE BY MOUTH TWICE DAILY 60 capsule 2     losartan (COZAAR) 100 MG tablet TAKE 1 TABLET BY MOUTH EVERY DAY 90 tablet 3     montelukast (SINGULAIR) 10 MG tablet TAKE 1 TABLET BY MOUTH EVERY EVENING 30 tablet 5     Multiple Vitamins-Minerals (MULTIVITAMIN GUMMIES ADULT) CHEW Take 2 tablets by mouth daily 30 tablet 0     spironolactone (ALDACTONE) 25 MG tablet TAKE 1 TABLET(25 MG) BY MOUTH DAILY 90 tablet 3       Allergies   Allergen Reactions     Ace Inhibitors Cough     Amitriptyline Other (See Comments) and Nausea     Nightmares     Codeine      Headache and nausea      Flagyl [Metronidazole] Nausea     Morphine Nausea     Promethazine      nausea     Seasonal      Sulfa Drugs         Social History     Tobacco Use     Smoking status: Never Smoker     Smokeless tobacco: Never Used  "  Substance Use Topics     Alcohol use: Not Currently     Alcohol/week: 0.0 standard drinks     Comment: rarely-3 drinks a year     Family History   Problem Relation Age of Onset     Hypertension Mother      Heart Failure Mother         Congestive     Other - See Comments Mother         marcos danlos??  hypermobility     Lipids Father         Hyperlipidemia     Cancer Father         Skin     Hypertension Father      Prostate Cancer Father      Suicide Maternal Grandmother      Diabetes Maternal Grandfather      Clotting Disorder Maternal Grandfather      Diabetes Paternal Grandmother      Kidney Disease Paternal Grandmother      Other - See Comments Paternal Grandfather         old age     Depression Brother      Diabetes Brother         type 2     Myocardial Infarction Brother 32        ETOHic, +tobacco     Peripheral Vascular Disease Brother      Hyperlipidemia Brother      Other - See Comments Sister         Post Partum DVT     Other Cancer Sister         retinal tear     Known Genetic Syndrome Daughter         marcos danlos     Known Genetic Syndrome Daughter         marcos danlos     History   Drug Use No         Objective     /74   Pulse 63   Temp 98.9  F (37.2  C)   Resp 12   Ht 1.676 m (5' 6\")   Wt 94.5 kg (208 lb 6.4 oz)   SpO2 97%   Breastfeeding No   BMI 33.64 kg/m      Physical Exam    GENERAL APPEARANCE: healthy, alert and no distress     EYES: EOMI, PERRL     HENT: ear canals and TM's normal and nose and mouth without ulcers or lesions     NECK: no adenopathy, no asymmetry, masses, or scars and thyroid normal to palpation     RESP: lungs clear to auscultation - no rales, rhonchi or wheezes     CV: regular rates and rhythm, normal S1 S2, no S3 or S4 and no murmur, click or rub     ABDOMEN:  soft, nontender, no HSM or masses and bowel sounds normal     MS: extremities normal- no gross deformities noted, no evidence of inflammation in joints, FROM in all extremities.     SKIN: no suspicious " lesions or rashes     NEURO: Normal strength and tone, sensory exam grossly normal, mentation intact and speech normal     PSYCH: mentation appears normal. and affect normal/bright     LYMPHATICS: No cervical adenopathy    Recent Labs   Lab Test 10/12/21  0818 06/23/21  0940 12/15/20  0935   HGB 13.1 12.7 12.2    208 268     --  139   POTASSIUM 4.0  --  3.9   CR 0.80  --  0.80        Diagnostics:  Labs pending at this time.  Results will be reviewed when available.   EKG: sinus bradycardia, normal axis, normal intervals, no acute ST/T changes c/w ischemia, possible LVH     Results for orders placed or performed in visit on 12/22/21   Basic Metabolic Panel     Status: Normal   Result Value Ref Range    Sodium 139 134 - 144 mmol/L    Potassium 4.2 3.5 - 5.1 mmol/L    Chloride 101 98 - 107 mmol/L    Carbon Dioxide (CO2) 31 21 - 31 mmol/L    Anion Gap 7 3 - 14 mmol/L    Urea Nitrogen 16 7 - 25 mg/dL    Creatinine 0.89 0.60 - 1.20 mg/dL    Calcium 9.9 8.6 - 10.3 mg/dL    Glucose 79 70 - 105 mg/dL    GFR Estimate 76 >60 mL/min/1.73m2   Hemoglobin     Status: Normal   Result Value Ref Range    Hemoglobin 12.5 11.7 - 15.7 g/dL   EKG 12-lead, tracing only     Status: None   Result Value Ref Range    Systolic Blood Pressure  mmHg    Diastolic Blood Pressure  mmHg    Ventricular Rate 54 BPM    Atrial Rate 54 BPM    DC Interval 164 ms    QRS Duration 78 ms     ms    QTc 421 ms    P Axis 47 degrees    R AXIS 1 degrees    T Axis 51 degrees    Interpretation ECG       Sinus bradycardia  Moderate voltage criteria for LVH, may be normal variant  Borderline ECG  No previous ECGs available  Confirmed by DO TAVAREZ STACY ([id number]) on 12/23/2021 6:14:52 AM           Revised Cardiac Risk Index (RCRI):  The patient has the following serious cardiovascular risks for perioperative complications:   - No serious cardiac risks = 0 points     RCRI Interpretation: 0 points: Class I (very low risk - 0.4% complication rate)            Signed Electronically by: Kandy Blood PA-C  Copy of this evaluation report is provided to requesting physician.

## 2021-12-21 NOTE — PATIENT INSTRUCTIONS

## 2021-12-22 ENCOUNTER — OFFICE VISIT (OUTPATIENT)
Dept: FAMILY MEDICINE | Facility: OTHER | Age: 56
End: 2021-12-22
Attending: PHYSICIAN ASSISTANT
Payer: COMMERCIAL

## 2021-12-22 VITALS
SYSTOLIC BLOOD PRESSURE: 126 MMHG | TEMPERATURE: 98.9 F | DIASTOLIC BLOOD PRESSURE: 74 MMHG | WEIGHT: 208.4 LBS | HEIGHT: 66 IN | BODY MASS INDEX: 33.49 KG/M2 | RESPIRATION RATE: 12 BRPM | OXYGEN SATURATION: 97 % | HEART RATE: 63 BPM

## 2021-12-22 DIAGNOSIS — E78.5 HYPERLIPIDEMIA LDL GOAL <130: Chronic | ICD-10-CM

## 2021-12-22 DIAGNOSIS — Z86.79 HX OF CARDIAC MURMUR: ICD-10-CM

## 2021-12-22 DIAGNOSIS — E03.8 SUBCLINICAL HYPOTHYROIDISM: ICD-10-CM

## 2021-12-22 DIAGNOSIS — G56.01 CARPAL TUNNEL SYNDROME OF RIGHT WRIST: ICD-10-CM

## 2021-12-22 DIAGNOSIS — I10 ESSENTIAL HYPERTENSION, BENIGN: Chronic | ICD-10-CM

## 2021-12-22 DIAGNOSIS — Z01.818 PREOP GENERAL PHYSICAL EXAM: Primary | ICD-10-CM

## 2021-12-22 DIAGNOSIS — G47.33 OSA (OBSTRUCTIVE SLEEP APNEA): Chronic | ICD-10-CM

## 2021-12-22 DIAGNOSIS — G35 MS (MULTIPLE SCLEROSIS) (H): Chronic | ICD-10-CM

## 2021-12-22 DIAGNOSIS — E88.819 INSULIN RESISTANCE: ICD-10-CM

## 2021-12-22 DIAGNOSIS — I51.89 DIASTOLIC DYSFUNCTION: Chronic | ICD-10-CM

## 2021-12-22 LAB
ANION GAP SERPL CALCULATED.3IONS-SCNC: 7 MMOL/L (ref 3–14)
BUN SERPL-MCNC: 16 MG/DL (ref 7–25)
CALCIUM SERPL-MCNC: 9.9 MG/DL (ref 8.6–10.3)
CHLORIDE BLD-SCNC: 101 MMOL/L (ref 98–107)
CO2 SERPL-SCNC: 31 MMOL/L (ref 21–31)
CREAT SERPL-MCNC: 0.89 MG/DL (ref 0.6–1.2)
GFR SERPL CREATININE-BSD FRML MDRD: 76 ML/MIN/1.73M2
GLUCOSE BLD-MCNC: 79 MG/DL (ref 70–105)
HGB BLD-MCNC: 12.5 G/DL (ref 11.7–15.7)
POTASSIUM BLD-SCNC: 4.2 MMOL/L (ref 3.5–5.1)
SODIUM SERPL-SCNC: 139 MMOL/L (ref 134–144)

## 2021-12-22 PROCEDURE — 82374 ASSAY BLOOD CARBON DIOXIDE: CPT | Mod: ZL | Performed by: PHYSICIAN ASSISTANT

## 2021-12-22 PROCEDURE — 82947 ASSAY GLUCOSE BLOOD QUANT: CPT | Mod: ZL | Performed by: PHYSICIAN ASSISTANT

## 2021-12-22 PROCEDURE — 36415 COLL VENOUS BLD VENIPUNCTURE: CPT | Mod: ZL | Performed by: PHYSICIAN ASSISTANT

## 2021-12-22 PROCEDURE — 85018 HEMOGLOBIN: CPT | Mod: ZL | Performed by: PHYSICIAN ASSISTANT

## 2021-12-22 PROCEDURE — 99214 OFFICE O/P EST MOD 30 MIN: CPT | Performed by: PHYSICIAN ASSISTANT

## 2021-12-22 PROCEDURE — 93000 ELECTROCARDIOGRAM COMPLETE: CPT | Performed by: INTERNAL MEDICINE

## 2021-12-22 ASSESSMENT — PAIN SCALES - GENERAL: PAINLEVEL: MILD PAIN (3)

## 2021-12-22 ASSESSMENT — ANXIETY QUESTIONNAIRES
6. BECOMING EASILY ANNOYED OR IRRITABLE: NOT AT ALL
1. FEELING NERVOUS, ANXIOUS, OR ON EDGE: NOT AT ALL
2. NOT BEING ABLE TO STOP OR CONTROL WORRYING: NOT AT ALL
3. WORRYING TOO MUCH ABOUT DIFFERENT THINGS: NOT AT ALL
GAD7 TOTAL SCORE: 0
IF YOU CHECKED OFF ANY PROBLEMS ON THIS QUESTIONNAIRE, HOW DIFFICULT HAVE THESE PROBLEMS MADE IT FOR YOU TO DO YOUR WORK, TAKE CARE OF THINGS AT HOME, OR GET ALONG WITH OTHER PEOPLE: NOT DIFFICULT AT ALL
7. FEELING AFRAID AS IF SOMETHING AWFUL MIGHT HAPPEN: NOT AT ALL
5. BEING SO RESTLESS THAT IT IS HARD TO SIT STILL: NOT AT ALL

## 2021-12-22 ASSESSMENT — MIFFLIN-ST. JEOR: SCORE: 1552.05

## 2021-12-22 ASSESSMENT — PATIENT HEALTH QUESTIONNAIRE - PHQ9: 5. POOR APPETITE OR OVEREATING: NOT AT ALL

## 2021-12-22 NOTE — NURSING NOTE
Patient presents to clinic for Pre-Op Exam.  Bella Turner LPN ....................  12/22/2021   10:54 AM

## 2021-12-23 LAB
ATRIAL RATE - MUSE: 54 BPM
DIASTOLIC BLOOD PRESSURE - MUSE: NORMAL MMHG
INTERPRETATION ECG - MUSE: NORMAL
P AXIS - MUSE: 47 DEGREES
PR INTERVAL - MUSE: 164 MS
QRS DURATION - MUSE: 78 MS
QT - MUSE: 444 MS
QTC - MUSE: 421 MS
R AXIS - MUSE: 1 DEGREES
SYSTOLIC BLOOD PRESSURE - MUSE: NORMAL MMHG
T AXIS - MUSE: 51 DEGREES
VENTRICULAR RATE- MUSE: 54 BPM

## 2021-12-23 ASSESSMENT — ANXIETY QUESTIONNAIRES: GAD7 TOTAL SCORE: 0

## 2021-12-27 DIAGNOSIS — G43.809 OTHER MIGRAINE WITHOUT STATUS MIGRAINOSUS, NOT INTRACTABLE: ICD-10-CM

## 2021-12-28 RX ORDER — BUTALBITAL, ACETAMINOPHEN AND CAFFEINE 50; 325; 40 MG/1; MG/1; MG/1
TABLET ORAL
Qty: 36 TABLET | Refills: 3 | Status: SHIPPED | OUTPATIENT
Start: 2021-12-28 | End: 2022-11-28

## 2021-12-28 NOTE — TELEPHONE ENCOUNTER
[facility]      Last Written Prescription Date:  9/17/21  Last Fill Quantity: 36,   # refills: 0  Last Office Visit: 9/30/21  Future Office visit:    Next 5 appointments (look out 90 days)    Girma 10, 2022  8:30 AM  Nurse Only with GUERLINE CONROY  [facility] ([facility] ) [address]  [facility] [address]  [phone]           Routing refill request to provider for review/approval because:

## 2022-01-06 RX ORDER — MULTIVITAMIN WITH IRON
TABLET ORAL
COMMUNITY

## 2022-01-10 ENCOUNTER — ALLIED HEALTH/NURSE VISIT (OUTPATIENT)
Dept: FAMILY MEDICINE | Facility: OTHER | Age: 57
End: 2022-01-10
Attending: SPECIALIST
Payer: COMMERCIAL

## 2022-01-10 DIAGNOSIS — Z20.822 COVID-19 RULED OUT: Primary | ICD-10-CM

## 2022-01-10 PROCEDURE — U0005 INFEC AGEN DETEC AMPLI PROBE: HCPCS | Mod: ZL

## 2022-01-10 PROCEDURE — C9803 HOPD COVID-19 SPEC COLLECT: HCPCS

## 2022-01-11 LAB — SARS-COV-2 RNA RESP QL NAA+PROBE: NEGATIVE

## 2022-01-13 ENCOUNTER — ANESTHESIA EVENT (OUTPATIENT)
Dept: SURGERY | Facility: OTHER | Age: 57
End: 2022-01-13
Payer: COMMERCIAL

## 2022-01-14 ENCOUNTER — ANESTHESIA (OUTPATIENT)
Dept: SURGERY | Facility: OTHER | Age: 57
End: 2022-01-14
Payer: COMMERCIAL

## 2022-01-14 ENCOUNTER — HOSPITAL ENCOUNTER (OUTPATIENT)
Facility: OTHER | Age: 57
Discharge: HOME | End: 2022-01-14
Attending: SPECIALIST | Admitting: SPECIALIST
Payer: COMMERCIAL

## 2022-01-14 VITALS
OXYGEN SATURATION: 99 % | BODY MASS INDEX: 33.64 KG/M2 | HEART RATE: 50 BPM | DIASTOLIC BLOOD PRESSURE: 96 MMHG | SYSTOLIC BLOOD PRESSURE: 142 MMHG | WEIGHT: 208.4 LBS | RESPIRATION RATE: 12 BRPM | TEMPERATURE: 97.9 F

## 2022-01-14 PROCEDURE — 250N000009 HC RX 250: Performed by: NURSE ANESTHETIST, CERTIFIED REGISTERED

## 2022-01-14 PROCEDURE — 258N000003 HC RX IP 258 OP 636: Performed by: NURSE ANESTHETIST, CERTIFIED REGISTERED

## 2022-01-14 PROCEDURE — 370N000017 HC ANESTHESIA TECHNICAL FEE, PER MIN: Performed by: SPECIALIST

## 2022-01-14 PROCEDURE — 710N000012 HC RECOVERY PHASE 2, PER MINUTE: Performed by: SPECIALIST

## 2022-01-14 PROCEDURE — 360N000076 HC SURGERY LEVEL 3, PER MIN: Performed by: SPECIALIST

## 2022-01-14 PROCEDURE — 29848 WRIST ENDOSCOPY/SURGERY: CPT | Mod: RT | Performed by: SPECIALIST

## 2022-01-14 PROCEDURE — 250N000011 HC RX IP 250 OP 636: Performed by: SPECIALIST

## 2022-01-14 PROCEDURE — 250N000011 HC RX IP 250 OP 636: Performed by: NURSE ANESTHETIST, CERTIFIED REGISTERED

## 2022-01-14 PROCEDURE — 999N000141 HC STATISTIC PRE-PROCEDURE NURSING ASSESSMENT: Performed by: SPECIALIST

## 2022-01-14 PROCEDURE — 29848 WRIST ENDOSCOPY/SURGERY: CPT | Performed by: NURSE ANESTHETIST, CERTIFIED REGISTERED

## 2022-01-14 PROCEDURE — 250N000009 HC RX 250: Performed by: SPECIALIST

## 2022-01-14 PROCEDURE — 272N000001 HC OR GENERAL SUPPLY STERILE: Performed by: SPECIALIST

## 2022-01-14 RX ORDER — SODIUM CHLORIDE, SODIUM LACTATE, POTASSIUM CHLORIDE, CALCIUM CHLORIDE 600; 310; 30; 20 MG/100ML; MG/100ML; MG/100ML; MG/100ML
INJECTION, SOLUTION INTRAVENOUS CONTINUOUS
Status: DISCONTINUED | OUTPATIENT
Start: 2022-01-14 | End: 2022-01-14 | Stop reason: HOSPADM

## 2022-01-14 RX ORDER — LIDOCAINE 40 MG/G
CREAM TOPICAL
Status: DISCONTINUED | OUTPATIENT
Start: 2022-01-14 | End: 2022-01-14 | Stop reason: HOSPADM

## 2022-01-14 RX ORDER — PROPOFOL 10 MG/ML
INJECTION, EMULSION INTRAVENOUS CONTINUOUS PRN
Status: DISCONTINUED | OUTPATIENT
Start: 2022-01-14 | End: 2022-01-14

## 2022-01-14 RX ORDER — LIDOCAINE HYDROCHLORIDE 20 MG/ML
INJECTION, SOLUTION INFILTRATION; PERINEURAL PRN
Status: DISCONTINUED | OUTPATIENT
Start: 2022-01-14 | End: 2022-01-14

## 2022-01-14 RX ORDER — FENTANYL CITRATE 50 UG/ML
50 INJECTION, SOLUTION INTRAMUSCULAR; INTRAVENOUS EVERY 5 MIN PRN
Status: DISCONTINUED | OUTPATIENT
Start: 2022-01-14 | End: 2022-01-14 | Stop reason: HOSPADM

## 2022-01-14 RX ORDER — PROPOFOL 10 MG/ML
INJECTION, EMULSION INTRAVENOUS PRN
Status: DISCONTINUED | OUTPATIENT
Start: 2022-01-14 | End: 2022-01-14

## 2022-01-14 RX ORDER — FENTANYL CITRATE 50 UG/ML
50 INJECTION, SOLUTION INTRAMUSCULAR; INTRAVENOUS
Status: DISCONTINUED | OUTPATIENT
Start: 2022-01-14 | End: 2022-01-14 | Stop reason: HOSPADM

## 2022-01-14 RX ORDER — KETOROLAC TROMETHAMINE 30 MG/ML
INJECTION, SOLUTION INTRAMUSCULAR; INTRAVENOUS PRN
Status: DISCONTINUED | OUTPATIENT
Start: 2022-01-14 | End: 2022-01-14

## 2022-01-14 RX ORDER — MAGNESIUM HYDROXIDE 1200 MG/15ML
LIQUID ORAL PRN
Status: DISCONTINUED | OUTPATIENT
Start: 2022-01-14 | End: 2022-01-14 | Stop reason: HOSPADM

## 2022-01-14 RX ORDER — ONDANSETRON 2 MG/ML
4 INJECTION INTRAMUSCULAR; INTRAVENOUS EVERY 30 MIN PRN
Status: DISCONTINUED | OUTPATIENT
Start: 2022-01-14 | End: 2022-01-14 | Stop reason: HOSPADM

## 2022-01-14 RX ORDER — ONDANSETRON 4 MG/1
4 TABLET, ORALLY DISINTEGRATING ORAL EVERY 30 MIN PRN
Status: DISCONTINUED | OUTPATIENT
Start: 2022-01-14 | End: 2022-01-14 | Stop reason: HOSPADM

## 2022-01-14 RX ORDER — MEPERIDINE HYDROCHLORIDE 50 MG/ML
12.5 INJECTION INTRAMUSCULAR; INTRAVENOUS; SUBCUTANEOUS
Status: DISCONTINUED | OUTPATIENT
Start: 2022-01-14 | End: 2022-01-14 | Stop reason: HOSPADM

## 2022-01-14 RX ADMIN — PROPOFOL 140 MCG/KG/MIN: 10 INJECTION, EMULSION INTRAVENOUS at 07:27

## 2022-01-14 RX ADMIN — PROPOFOL 70 MG: 10 INJECTION, EMULSION INTRAVENOUS at 07:27

## 2022-01-14 RX ADMIN — SODIUM CHLORIDE, POTASSIUM CHLORIDE, SODIUM LACTATE AND CALCIUM CHLORIDE: 600; 310; 30; 20 INJECTION, SOLUTION INTRAVENOUS at 07:21

## 2022-01-14 RX ADMIN — KETOROLAC TROMETHAMINE 30 MG: 30 INJECTION, SOLUTION INTRAMUSCULAR at 07:48

## 2022-01-14 RX ADMIN — LIDOCAINE HYDROCHLORIDE 60 MG: 20 INJECTION, SOLUTION INFILTRATION; PERINEURAL at 07:27

## 2022-01-14 NOTE — OR NURSING
Tea has been discharged to home at 0859 via wheelchair accompanied by Lucio RN and Don, .    Written discharge instructions were provided to Teddy and Tea.  Prescriptions were none.  Patient states their pain is 0/10, and denies any nausea or dizziness upon discharge.  Patient still has numbness in finger 4-5.    Patient and adult caring for them verbalize understanding of discharge instructions including no driving until tomorrow and no longer taking narcotic pain medications - no operating mechanical equipment and no making any important decisions.They understand reason for discharge, and necessary follow-up appointments.

## 2022-01-14 NOTE — ANESTHESIA CARE TRANSFER NOTE
Patient: Yvonne Llanos    Procedure: Procedure(s):  RELEASE, CARPAL TUNNEL, ENDOSCOPIC       Diagnosis: Right carpal tunnel syndrome [G56.01]  Diagnosis Additional Information: No value filed.    Anesthesia Type:   No value filed.     Note:    Oropharynx: oropharynx clear of all foreign objects  Level of Consciousness: awake  Oxygen Supplementation: room air    Independent Airway: airway patency satisfactory and stable  Dentition: dentition unchanged  Vital Signs Stable: post-procedure vital signs reviewed and stable  Report to RN Given: handoff report given  Patient transferred to: Phase II    Handoff Report: Identifed the Patient, Identified the Reponsible Provider, Reviewed the pertinent medical history, Discussed the surgical course, Reviewed Intra-OP anesthesia mangement and issues during anesthesia, Set expectations for post-procedure period and Allowed opportunity for questions and acknowledgement of understanding      Vitals:  Vitals Value Taken Time   BP     Temp     Pulse     Resp     SpO2         Electronically Signed By: EL Bernal CRNA  January 14, 2022  7:53 AM

## 2022-01-14 NOTE — BRIEF OP NOTE
[facility]    Brief Operative Note    Pre-operative diagnosis: Right carpal tunnel syndrome [G56.01]  Post-operative diagnosis Same as pre-operative diagnosis    Procedure: Procedure(s):  RELEASE, CARPAL TUNNEL, ENDOSCOPIC  Surgeon: Surgeon(s) and Role:     * Bal Cespedes MD - Primary     * Dhiraj Nesbitt PA - Assisting  Anesthesia: Combined MAC with Local   Estimated Blood Loss: None    Drains: None  Specimens: * No specimens in log *  Findings:   None.  Complications: None.  Implants: * No implants in log *

## 2022-01-14 NOTE — ANESTHESIA PREPROCEDURE EVALUATION
Anesthesia Pre-Procedure Evaluation    Patient: Yvonne Llanos   MRN: [id number] : 1965        Preoperative Diagnosis: Right carpal tunnel syndrome [G56.01]    Procedure : Procedure(s):  RELEASE, CARPAL TUNNEL, ENDOSCOPIC          Past Medical History:   Diagnosis Date     Endometriosis      Hypertensive urgency 2019     Other abnormal glucose 2011     Pancreatitis due to biliary obstruction     improved after cholecystectomy      Past Surgical History:   Procedure Laterality Date      SECTION        SECTION       CHOLECYSTECTOMY       HYSTERECTOMY TOTAL ABDOMINAL, BILATERAL SALPINGO-OOPHORECTOMY, COMBINED  2001    Endometriosis     LAPAROSCOPIC GASTRIC SLEEVE  2014    [place]     LAPAROSCOPY      D&C, exploratory,  placental tissue adhered to uterus     ORTHOPEDIC SURGERY      rotator cuff tendon surgery Left       Allergies   Allergen Reactions     Amitriptyline Other (See Comments) and Nausea     Nightmares     Ace Inhibitors Cough     Codeine Headache and Nausea     Headache and nausea      Flagyl [Metronidazole] Nausea     Morphine Nausea     Promethazine Nausea     nausea     Seasonal Unknown     Sulfa Drugs Unknown      Social History     Tobacco Use     Smoking status: Never Smoker     Smokeless tobacco: Never Used   Substance Use Topics     Alcohol use: Not Currently     Alcohol/week: 0.0 standard drinks     Comment: rarely-3 drinks a year      Wt Readings from Last 1 Encounters:   22 94.5 kg (208 lb 6.4 oz)        Anesthesia Evaluation   Pt has had prior anesthetic.     No history of anesthetic complications       ROS/MED HX  ENT/Pulmonary:     (+) sleep apnea, severe, uses CPAP,     Neurologic: Comment: Fibromyalgia    (+) migraines, Multiple Sclerosis,     Cardiovascular:     (+) hypertension-----CHF etiology: diastolic     METS/Exercise Tolerance: >4 METS    Hematologic:  - neg hematologic  ROS     Musculoskeletal:  - neg  musculoskeletal ROS     GI/Hepatic:  - neg GI/hepatic ROS     Renal/Genitourinary:  - neg Renal ROS     Endo:     (+) type II DM, thyroid problem, hypothyroidism, Obesity,     Psychiatric/Substance Use:  - neg psychiatric ROS     Infectious Disease:  - neg infectious disease ROS     Malignancy:  - neg malignancy ROS     Other:  - neg other ROS          Physical Exam    Airway        Mallampati: II   TM distance: > 3 FB   Neck ROM: full   Mouth opening: > 3 cm    Respiratory Devices and Support         Dental  no notable dental history         Cardiovascular          Rhythm and rate: regular and normal   (+) murmur       Pulmonary   pulmonary exam normal        breath sounds clear to auscultation           OUTSIDE LABS:  CBC:   Lab Results   Component Value Date    WBC 6.2 10/12/2021    WBC 5.0 06/23/2021    HGB 12.5 12/22/2021    HGB 13.1 10/12/2021    HCT 39.1 10/12/2021    HCT 38.4 06/23/2021     10/12/2021     06/23/2021     BMP:   Lab Results   Component Value Date     12/22/2021     10/12/2021    POTASSIUM 4.2 12/22/2021    POTASSIUM 4.0 10/12/2021    CHLORIDE 101 12/22/2021    CHLORIDE 105 10/12/2021    CO2 31 12/22/2021    CO2 28 10/12/2021    BUN 16 12/22/2021    BUN 19 10/12/2021    CR 0.89 12/22/2021    CR 0.80 10/12/2021    GLC 79 12/22/2021    GLC 96 10/12/2021     COAGS: No results found for: PTT, INR, FIBR  POC: No results found for: BGM, HCG, HCGS  HEPATIC:   Lab Results   Component Value Date    ALBUMIN 3.9 10/12/2021    PROTTOTAL 7.3 10/12/2021    ALT 19 10/12/2021    AST 12 10/12/2021    ALKPHOS 89 10/12/2021    BILITOTAL 0.3 10/12/2021     OTHER:   Lab Results   Component Value Date    A1C 5.7 (H) 05/09/2019    NIMCO 9.9 12/22/2021    TSH 5.63 (H) 12/15/2020    T4 8.8 12/15/2020    T4 0.82 12/15/2020    T3 84 12/15/2020    CRP 9.6 (H) 10/12/2021    SED 25 10/12/2021       Anesthesia Plan    ASA Status:  3   NPO Status:  NPO Appropriate    Anesthesia Type: MAC.     - Reason  for MAC: chronic cardiopulmonary disease              Consents    Anesthesia Plan(s) and associated risks, benefits, and realistic alternatives discussed. Questions answered and patient/representative(s) expressed understanding.     - Discussed: Risks, Benefits and Alternatives for BOTH SEDATION and the PROCEDURE were discussed     - Discussed with:  Patient      - Extended Intubation/Ventilatory Support Discussed: No.      - Patient is DNR/DNI Status: No    Use of blood products discussed: No .     Postoperative Care            Comments:                EL Morales CRNA

## 2022-01-14 NOTE — OP NOTE
Procedure Date: 01/14/2022    PREOPERATIVE DIAGNOSIS:  Right carpal tunnel syndrome.    POSTOPERATIVE DIAGNOSIS:  Right carpal tunnel syndrome.    PROCEDURE PERFORMED:  Right endoscopic carpal tunnel release.    SURGEON:  Bal Cespedes MD    ASSISTANT:  Dhiraj Nesbitt PA-C    ANESTHESIA:  Local with IV sedation.    INDICATIONS FOR PROCEDURE:  This is a 56-year-old female with EMG confirmed carpal tunnel syndrome.  She failed nonoperative treatment and was offered endoscopic carpal tunnel release and elected to proceed.    DESCRIPTION OF PROCEDURE:  Following medical clearance, the patient was brought to the operating room and placed on the operating table.  Pneumatic tourniquet was placed about the right upper extremity.  The right upper extremity was prepped and draped in normal sterile manner.  Timeout procedure performed confirming surgical site, patient, and procedure.  Right upper extremity was then elevated, exsanguinated, and the tourniquet was inflated to 250 mmHg.  A sterile marking pen was used to george the proposed skin incisions.  The hamate was outlined as was a transverse incision in the fourth ray aiming line.  Incision site was infiltrated with local anesthetic without epinephrine.  Incision was made, carried sharply down through skin through subcutaneous tissue.  Careful spreading was performed.  Antebrachial fascia was identified and a distally based U-shaped flap was elevated.  Spatula was placed beneath the transverse carpal ligament followed by sequential dilators.  The endoscope was introduced into the wrist.  The distal aspect of the transverse carpal ligament was well visualized.  Palmar pressure applied, blade was elevated, and the transverse carpal ligament released.  Following this, through endoscopic portal, closed both sides of the  transverse carpal ligament could be identified.  The endoscope was then withdrawn from the wrist.  Antebrachial fascia was approximated with tenotomy  scissors.  Tourniquet was deflated.  Circulation returned promptly to the hand and fingers.  Hemostasis with direct pressure.  Skin was closed with interrupted 4-0 nylon suture.  Sterile dressing was applied.  The patient was brought to recovery in stable condition.    Bal Cespedes MD        D: 2022   T: 2022   MT: [id number]    Name:     SARAH GARCIA  MRN:      [id number]        Account:        [id number]   :      1965           Procedure Date: 2022     Document: [id number]

## 2022-01-14 NOTE — DISCHARGE INSTRUCTIONS
[facility]  Adult Discharge Orders & Instructions    ________________________________________________________________          For 12 hours after surgery  1. Get plenty of rest.  A responsible adult must stay with you for at least 12 hours after you leave the hospital.   2. You may feel lightheaded.  IF so, sit for a few minutes before standing.  Have someone help you get up.   3. You may have a slight fever. Call the doctor if your fever is over 101 F (38.3 C) (taken under the tongue) or lasts longer than 24 hours.  4. You may have a dry mouth, a sore throat, muscle aches or trouble sleeping.  These should go away after 24 hours.  5. Do not make important or legal decisions.  6.   Do not drive or use heavy equipment.  If you have weakness or tingling, don't drive or use heavy equipment until this feeling goes away.    To contact a doctor, call   [phone]_______________________    Surgeon Contact Information  If you have questions or concerns related to your procedure please contact your surgeon through [facility] at [phone].

## 2022-01-14 NOTE — ANESTHESIA POSTPROCEDURE EVALUATION
Patient: Yvonne Llanos    Procedure: Procedure(s):  RELEASE, CARPAL TUNNEL, ENDOSCOPIC       Diagnosis:Right carpal tunnel syndrome [G56.01]  Diagnosis Additional Information: No value filed.    Anesthesia Type:  No value filed.    Note:  Disposition: Outpatient   Postop Pain Control: Uneventful            Sign Out: Well controlled pain   PONV: No   Neuro/Psych: Uneventful            Sign Out: Acceptable/Baseline neuro status   Airway/Respiratory: Uneventful            Sign Out: Acceptable/Baseline resp. status   CV/Hemodynamics: Uneventful            Sign Out: Acceptable CV status; No obvious hypovolemia; No obvious fluid overload   Other NRE: NONE   DID A NON-ROUTINE EVENT OCCUR? No           Last vitals:  Vitals Value Taken Time   /96 01/14/22 0830   Temp 97.9  F (36.6  C) 01/14/22 0830   Pulse 50 01/14/22 0830   Resp     SpO2 98 % 01/14/22 0837   Vitals shown include unvalidated device data.    Electronically Signed By: EL Morales CRNA  January 14, 2022  11:14 AM

## 2022-01-21 ENCOUNTER — OFFICE VISIT (OUTPATIENT)
Dept: ORTHOPEDICS | Facility: OTHER | Age: 57
End: 2022-01-21
Attending: SPECIALIST
Payer: COMMERCIAL

## 2022-01-21 DIAGNOSIS — Z98.890 S/P CARPAL TUNNEL RELEASE: Primary | ICD-10-CM

## 2022-01-21 PROCEDURE — 99024 POSTOP FOLLOW-UP VISIT: CPT | Performed by: SPECIALIST

## 2022-01-21 NOTE — PROGRESS NOTES
Patient is here for follow up on her right carpal tunnel release.   Annie Harding LPN .....................1/21/2022 1:02 PM

## 2022-01-21 NOTE — PROGRESS NOTES
Visit Date: 2022    HISTORY:  Yvonne Garcia returns for followup status post right endoscopic carpal tunnel release.  She is 1 week out, doing well.  The patient's numbness has resolved. Her [id number]-[id number]-[id number] date is scheduled as 2022.    PHYSICAL EXAMINATION:  Reveals her right carpal tunnel incision to be healing nicely.  Digital range of motion is full.  Intrinsic function is intact.    IMPRESSION AND PLAN:  At this point, I think she is doing well.  Sutures were removed.  We gave her a kcltnc-[id number] form dated 2022.  She does mention she would like to proceed with EMG study of the left upper extremity.  This will be obtained, and I will contact her when the results become available.      Bal Cespedes MD        D: 2022   T: 2022   MT: TRINITY    Name:     YVONNE GARCIA  MRN:      [id number]        Account:    [id number]   :      1965           Visit Date: 2022     Document: [id number]

## 2022-02-24 ENCOUNTER — OFFICE VISIT (OUTPATIENT)
Dept: FAMILY MEDICINE | Facility: OTHER | Age: 57
End: 2022-02-24
Attending: NURSE PRACTITIONER
Payer: COMMERCIAL

## 2022-02-24 VITALS
WEIGHT: 211.1 LBS | HEART RATE: 52 BPM | OXYGEN SATURATION: 98 % | BODY MASS INDEX: 35.17 KG/M2 | HEIGHT: 65 IN | SYSTOLIC BLOOD PRESSURE: 116 MMHG | TEMPERATURE: 97 F | RESPIRATION RATE: 16 BRPM | DIASTOLIC BLOOD PRESSURE: 82 MMHG

## 2022-02-24 DIAGNOSIS — E66.01 MORBID OBESITY (H): ICD-10-CM

## 2022-02-24 DIAGNOSIS — Z00.00 ROUTINE GENERAL MEDICAL EXAMINATION AT A HEALTH CARE FACILITY: Primary | ICD-10-CM

## 2022-02-24 DIAGNOSIS — I51.89 DIASTOLIC DYSFUNCTION: ICD-10-CM

## 2022-02-24 DIAGNOSIS — N90.7 LABIAL CYST: ICD-10-CM

## 2022-02-24 DIAGNOSIS — R10.11 ABDOMINAL PAIN, RIGHT UPPER QUADRANT: ICD-10-CM

## 2022-02-24 LAB
ALBUMIN SERPL-MCNC: 3.9 G/DL (ref 3.4–5)
ALP SERPL-CCNC: 98 U/L (ref 40–150)
ALT SERPL W P-5'-P-CCNC: 20 U/L (ref 0–50)
ANION GAP SERPL CALCULATED.3IONS-SCNC: 4 MMOL/L (ref 3–14)
AST SERPL W P-5'-P-CCNC: 11 U/L (ref 0–45)
BILIRUB SERPL-MCNC: 0.3 MG/DL (ref 0.2–1.3)
BUN SERPL-MCNC: 16 MG/DL (ref 7–30)
CALCIUM SERPL-MCNC: 9.5 MG/DL (ref 8.5–10.1)
CHLORIDE BLD-SCNC: 102 MMOL/L (ref 94–109)
CHOLEST SERPL-MCNC: 269 MG/DL
CO2 SERPL-SCNC: 29 MMOL/L (ref 20–32)
CREAT SERPL-MCNC: 0.78 MG/DL (ref 0.52–1.04)
FASTING STATUS PATIENT QL REPORTED: YES
GFR SERPL CREATININE-BSD FRML MDRD: 89 ML/MIN/1.73M2
GLUCOSE BLD-MCNC: 87 MG/DL (ref 70–99)
HDLC SERPL-MCNC: 55 MG/DL
HOLD SPECIMEN: NORMAL
LDLC SERPL CALC-MCNC: 152 MG/DL
NONHDLC SERPL-MCNC: 214 MG/DL
POTASSIUM BLD-SCNC: 3.8 MMOL/L (ref 3.4–5.3)
PROT SERPL-MCNC: 7.8 G/DL (ref 6.8–8.8)
SODIUM SERPL-SCNC: 135 MMOL/L (ref 133–144)
T4 FREE SERPL-MCNC: 0.85 NG/DL (ref 0.76–1.46)
TRIGL SERPL-MCNC: 311 MG/DL
TSH SERPL DL<=0.005 MIU/L-ACNC: 4.28 MU/L (ref 0.4–4)

## 2022-02-24 PROCEDURE — 36415 COLL VENOUS BLD VENIPUNCTURE: CPT | Performed by: NURSE PRACTITIONER

## 2022-02-24 PROCEDURE — 80061 LIPID PANEL: CPT | Performed by: NURSE PRACTITIONER

## 2022-02-24 PROCEDURE — 84443 ASSAY THYROID STIM HORMONE: CPT | Performed by: NURSE PRACTITIONER

## 2022-02-24 PROCEDURE — 80053 COMPREHEN METABOLIC PANEL: CPT | Performed by: NURSE PRACTITIONER

## 2022-02-24 PROCEDURE — 99396 PREV VISIT EST AGE 40-64: CPT | Performed by: NURSE PRACTITIONER

## 2022-02-24 PROCEDURE — 84439 ASSAY OF FREE THYROXINE: CPT | Performed by: NURSE PRACTITIONER

## 2022-02-24 ASSESSMENT — PAIN SCALES - GENERAL: PAINLEVEL: NO PAIN (0)

## 2022-02-24 NOTE — NURSING NOTE
"Chief Complaint   Patient presents with     Physical       Initial /82   Pulse 52   Temp 97  F (36.1  C)   Resp 16   Ht 1.645 m (5' 4.76\")   Wt 95.8 kg (211 lb 1.6 oz)   SpO2 98%   BMI 35.39 kg/m   Estimated body mass index is 35.39 kg/m  as calculated from the following:    Height as of this encounter: 1.645 m (5' 4.76\").    Weight as of this encounter: 95.8 kg (211 lb 1.6 oz).  Medication Reconciliation: kathrin Albright  "

## 2022-02-24 NOTE — PROGRESS NOTES
SUBJECTIVE:   CC: Yvonne Llanos is an 56 year old woman who presents for preventive health visit.       Healthy Habits:     Getting at least 3 servings of Calcium per day:  Yes    Bi-annual eye exam:  Yes    Dental care twice a year:  Yes    Sleep apnea or symptoms of sleep apnea:  Excessive snoring    Diet:  Low salt    Frequency of exercise:  2-3 days/week    Duration of exercise:  15-30 minutes    Taking medications regularly:  Yes    PHQ-2 Total Score: 0    Additional concerns today:  Yes    Concern - firm lesion in left labia   Onset: 2 years     Description:   Pea sized subdermal mass left side groin     Intensity: mild    Progression of Symptoms:  same    Accompanying Signs & Symptoms:  None    Previous history of similar problem:   None    Precipitating factors:   Worsened by: None    Alleviating factors:  Improved by: None    Therapies Tried and outcome: None     ABDOMINAL   PAIN     Onset: 2 years - intermittently     Description:   Character: Sharp  Location: right upper quadrant  Radiation: None    Intensity: severe    Progression of Symptoms:  same    Accompanying Signs & Symptoms:  Fever/Chills?: no   Gas/Bloating: no   Nausea: no   Vomitting: no   Diarrhea?: no   Constipation:YES  Dysuria or Hematuria: no    History:   Trauma: no   Previous similar pain: YES- Rib pain r/t MS    Previous tests done: none    Precipitating factors:   Does the pain change with:     Food: no      BM: YES- Worse with constipation     Urination: no     Alleviating factors:  None    Therapies Tried and outcome: None    LMP:  not applicable        Hyperlipidemia Follow-Up      Are you regularly taking any medication or supplement to lower your cholesterol?   No    Are you having muscle aches or other side effects that you think could be caused by your cholesterol lowering medication?  n/a    Hypertension Follow-up      Do you check your blood pressure regularly outside of the clinic? Yes     Are you following a low salt  diet? Yes    Are your blood pressures ever more than 140 on the top number (systolic) OR more   than 90 on the bottom number (diastolic), for example 140/90? Yes    Migraine     Since your last clinic visit, how have your headaches changed?  Improved    How often are you getting headaches or migraines? Weekly     Are you able to do normal daily activities when you have a migraine? No    Are you taking rescue/relief medications? (Select all that apply) Other: ESGIC    How helpful is your rescue/relief medication?  I get total relief    Are you taking any medications to prevent migraines? (Select all that apply)  No    In the past 4 weeks, how often have you gone to urgent care or the emergency room because of your headaches?  0    Hypothyroidism Follow-up      Since last visit, patient describes the following symptoms: Weight stable, no hair loss, no skin changes, no constipation, no loose stools         Today's PHQ-2 Score:   PHQ-2 ( 1999 Pfizer) 2/24/2022   Q1: Little interest or pleasure in doing things 0   Q2: Feeling down, depressed or hopeless 0   PHQ-2 Score 0   PHQ-2 Total Score (12-17 Years)- Positive if 3 or more points; Administer PHQ-A if positive -   Q1: Little interest or pleasure in doing things Not at all   Q2: Feeling down, depressed or hopeless Not at all   PHQ-2 Score 0       Abuse: Current or Past (Physical, Sexual or Emotional) - No  Do you feel safe in your environment? Yes        Social History     Tobacco Use     Smoking status: Never Smoker     Smokeless tobacco: Never Used   Substance Use Topics     Alcohol use: Not Currently     Alcohol/week: 0.0 standard drinks     Comment: rarely-3 drinks a year     If you drink alcohol do you typically have >3 drinks per day or >7 drinks per week? No    Alcohol Use 2/24/2022   Prescreen: >3 drinks/day or >7 drinks/week? No   Prescreen: >3 drinks/day or >7 drinks/week? -       Reviewed orders with patient.  Reviewed health maintenance and updated orders  accordingly - Yes  Labs reviewed in EPIC  BP Readings from Last 3 Encounters:   22 116/82   22 (!) 142/96   21 126/74    Wt Readings from Last 3 Encounters:   22 95.8 kg (211 lb 1.6 oz)   22 94.5 kg (208 lb 6.4 oz)   21 94.5 kg (208 lb 6.4 oz)                  Patient Active Problem List   Diagnosis     Hyperlipidemia LDL goal <130     Insulin resistance     Essential hypertension, benign     Rotator cuff tendonitis, left     MS (multiple sclerosis) (H)     Fibromyalgia     Tear film insufficiency     Status post bariatric surgery     Subclinical hypothyroidism     Hx of cardiac murmur     Diastolic dysfunction grade 1 on 5/10/2019     Other fatigue     Obesity     Resistant hypertension     KARRIE (obstructive sleep apnea)- severe (AHI 30)     Migraines     Seasonal allergies     Blepharitis     Morbid obesity (H)     Past Surgical History:   Procedure Laterality Date      SECTION        SECTION       CHOLECYSTECTOMY       ENDOSCOPIC RELEASE CARPAL TUNNEL Right 2022    Procedure: RELEASE, CARPAL TUNNEL, ENDOSCOPIC;  Surgeon: Bal Cespedes MD;  Location: [facility]     HYSTERECTOMY TOTAL ABDOMINAL, BILATERAL SALPINGO-OOPHORECTOMY, COMBINED  2001    Endometriosis     LAPAROSCOPIC GASTRIC SLEEVE  2014    [place]     LAPAROSCOPY      D&C, exploratory,  placental tissue adhered to uterus     ORTHOPEDIC SURGERY  2015    rotator cuff tendon surgery Left        Social History     Tobacco Use     Smoking status: Never Smoker     Smokeless tobacco: Never Used   Substance Use Topics     Alcohol use: Not Currently     Alcohol/week: 0.0 standard drinks     Comment: rarely-3 drinks a year     Family History   Problem Relation Age of Onset     Hypertension Mother      Heart Failure Mother         Congestive     Other - See Comments Mother         marcos danlos??  hypermobility     Lipids Father         Hyperlipidemia     Cancer Father         Skin      Hypertension Father      Prostate Cancer Father      Suicide Maternal Grandmother      Diabetes Maternal Grandfather      Clotting Disorder Maternal Grandfather      Diabetes Paternal Grandmother      Kidney Disease Paternal Grandmother      Other - See Comments Paternal Grandfather         old age     Depression Brother      Diabetes Brother         type 2     Myocardial Infarction Brother 32        ETOHic, +tobacco     Peripheral Vascular Disease Brother      Hyperlipidemia Brother      Other - See Comments Sister         Post Partum DVT     Other Cancer Sister         retinal tear     Known Genetic Syndrome Daughter         marcos danlos     Known Genetic Syndrome Daughter         marcos danlos           Breast Cancer Screening:  Any new diagnosis of family breast, ovarian, or bowel cancer? No    Breast CA Risk Assessment (FHS-7) 2021   Do you have a family history of breast, colon, or ovarian cancer? No / Unknown         Mammogram Screening: Recommended mammography every 1-2 years with patient discussion and risk factor consideration  Pertinent mammograms are reviewed under the imaging tab.    History of abnormal Pap smear: Status post benign hysterectomy. Health Maintenance and Surgical History updated.     Reviewed and updated as needed this visit by clinical staff    Allergies  Meds  Problems  Med Hx  Surg Hx           Reviewed and updated as needed this visit by Provider     Meds  Problems  Med Hx  Surg Hx          Past Medical History:   Diagnosis Date     Endometriosis      Hypertensive urgency 2019     Other abnormal glucose 2011     Pancreatitis due to biliary obstruction     improved after cholecystectomy      Past Surgical History:   Procedure Laterality Date      SECTION        SECTION       CHOLECYSTECTOMY       ENDOSCOPIC RELEASE CARPAL TUNNEL Right 2022    Procedure: RELEASE, CARPAL TUNNEL, ENDOSCOPIC;  Surgeon: Bal Cespedes MD;   "Location: [facility]     HYSTERECTOMY TOTAL ABDOMINAL, BILATERAL SALPINGO-OOPHORECTOMY, COMBINED  2001    Endometriosis     LAPAROSCOPIC GASTRIC SLEEVE  2014    [place]     LAPAROSCOPY  1997    D&C, exploratory,  placental tissue adhered to uterus     ORTHOPEDIC SURGERY  2015    rotator cuff tendon surgery Left      OB History    Para Term  AB Living   3 3 3 0 0 3   SAB IAB Ectopic Multiple Live Births   0 0 0 0 0      # Outcome Date GA Lbr Hany/2nd Weight Sex Delivery Anes PTL Lv   3 Term            2 Term            1 Term               Obstetric Comments   Breast fed       Review of Systems  CONSTITUTIONAL: NEGATIVE for fever, chills, change in weight  INTEGUMENTARY/SKIN: NEGATIVE for worrisome rashes, moles or lesions  EYES: NEGATIVE for vision changes or irritation  ENT: NEGATIVE for ear, mouth and throat problems  RESP: NEGATIVE for significant cough or SOB  BREAST: NEGATIVE for masses, tenderness or discharge  CV: NEGATIVE for chest pain, palpitations or peripheral edema  GI: NEGATIVE for nausea, abdominal pain, heartburn, or change in bowel habits  : NEGATIVE for unusual urinary or vaginal symptoms. No vaginal bleeding.  MUSCULOSKELETAL: NEGATIVE for significant arthralgias or myalgia  NEURO: NEGATIVE for weakness, dizziness or paresthesias  ENDOCRINE: NEGATIVE for temperature intolerance, skin/hair changes  PSYCHIATRIC: NEGATIVE for changes in mood or affect      OBJECTIVE:   /82   Pulse 52   Temp 97  F (36.1  C)   Resp 16   Ht 1.645 m (5' 4.76\")   Wt 95.8 kg (211 lb 1.6 oz)   SpO2 98%   BMI 35.39 kg/m    Physical Exam  GENERAL: healthy, alert and no distress  EYES: Eyes grossly normal to inspection, PERRL and conjunctivae and sclerae normal  HENT: ear canals and TM's normal, nose and mouth without ulcers or lesions  NECK: no adenopathy, no asymmetry, masses, or scars and thyroid normal to palpation  RESP: lungs clear to auscultation - no rales, rhonchi or wheezes  BREAST: " "deferred   CV: regular rate and rhythm, normal S1 S2, no S3 or S4, no murmur, click or rub, no peripheral edema and peripheral pulses strong  ABDOMEN: soft, nontender, no hepatosplenomegaly, no masses and bowel sounds normal. +ventral region with questionable weakness in muscle wall    (female): Deferred gyn exam. She had me check a lump on her labia that feels like a pea size firm cyst without fluid  MS: no gross musculoskeletal defects noted, no edema  SKIN: no suspicious lesions or rashes  NEURO: Normal strength and tone, mentation intact and speech normal  PSYCH: mentation appears normal, affect normal/bright    Diagnostic Test Results:  Labs reviewed in Epic    ASSESSMENT/PLAN:       ICD-10-CM    1. Routine general medical examination at a health care facility  Z00.00 TSH with free T4 reflex     Lipid Profile (Chol, Trig, HDL, LDL calc)     Comprehensive metabolic panel     TSH with free T4 reflex     Lipid Profile (Chol, Trig, HDL, LDL calc)     Comprehensive metabolic panel     T4 free   2. Abdominal pain, right upper quadrant  R10.11 CT Abdomen Pelvis w/o & w Contrast   3. Morbid obesity (H)  E66.01    4. Labial cyst  N90.7    5. Diastolic dysfunction grade 1 on 5/10/2019  I51.89 Echocardiogram Complete         COUNSELING:  Reviewed preventive health counseling, as reflected in patient instructions       Regular exercise       Healthy diet/nutrition       Vision screening       Osteoporosis prevention/bone health       Colorectal Cancer Screening    Estimated body mass index is 35.39 kg/m  as calculated from the following:    Height as of this encounter: 1.645 m (5' 4.76\").    Weight as of this encounter: 95.8 kg (211 lb 1.6 oz).    Weight management plan: Discussed healthy diet and exercise guidelines    She reports that she has never smoked. She has never used smokeless tobacco.      Counseling Resources:  ATP IV Guidelines  Pooled Cohorts Equation Calculator  Breast Cancer Risk Calculator  BRCA-Related " Cancer Risk Assessment: FHS-7 Tool  FRAX Risk Assessment  ICSI Preventive Guidelines  Dietary Guidelines for Americans, 2010  USDA's MyPlate  ASA Prophylaxis  Lung CA Screening    Jill Correa NP  [facility]

## 2022-03-10 ENCOUNTER — HOSPITAL ENCOUNTER (OUTPATIENT)
Dept: CT IMAGING | Facility: HOSPITAL | Age: 57
Discharge: HOME | End: 2022-03-10
Attending: NURSE PRACTITIONER
Payer: COMMERCIAL

## 2022-03-10 ENCOUNTER — HOSPITAL ENCOUNTER (OUTPATIENT)
Dept: CARDIOLOGY | Facility: HOSPITAL | Age: 57
Discharge: HOME | End: 2022-03-10
Attending: NURSE PRACTITIONER
Payer: COMMERCIAL

## 2022-03-10 DIAGNOSIS — I51.89 DIASTOLIC DYSFUNCTION: ICD-10-CM

## 2022-03-10 DIAGNOSIS — R10.11 ABDOMINAL PAIN, RIGHT UPPER QUADRANT: ICD-10-CM

## 2022-03-10 LAB — LVEF ECHO: NORMAL

## 2022-03-10 PROCEDURE — 93306 TTE W/DOPPLER COMPLETE: CPT

## 2022-03-10 PROCEDURE — 74176 CT ABD & PELVIS W/O CONTRAST: CPT

## 2022-03-10 PROCEDURE — 93306 TTE W/DOPPLER COMPLETE: CPT | Mod: 26 | Performed by: INTERNAL MEDICINE

## 2022-03-11 ENCOUNTER — TELEPHONE (OUTPATIENT)
Dept: FAMILY MEDICINE | Facility: OTHER | Age: 57
End: 2022-03-11
Payer: COMMERCIAL

## 2022-03-11 NOTE — TELEPHONE ENCOUNTER
2:49 PM    Reason for Call: Phone Call    Description: pt would like echo results if in    Was an appointment offered for this call? No  If yes : Appointment type              Date    Preferred method for responding to this message: Telephone Call  What is your phone number ?[phone]    If we cannot reach you directly, may we leave a detailed response at the number you provided? Yes    Can this message wait until your PCP/provider returns, if available today? Not applicable    Brittany Carcamo

## 2022-03-14 DIAGNOSIS — I51.89 DIASTOLIC DYSFUNCTION: ICD-10-CM

## 2022-03-14 DIAGNOSIS — I16.0 HYPERTENSIVE URGENCY: ICD-10-CM

## 2022-03-14 DIAGNOSIS — I10 ESSENTIAL HYPERTENSION, BENIGN: ICD-10-CM

## 2022-03-14 RX ORDER — SPIRONOLACTONE 25 MG/1
TABLET ORAL
Qty: 90 TABLET | Refills: 3 | Status: SHIPPED | OUTPATIENT
Start: 2022-03-14 | End: 2023-02-27

## 2022-03-26 ENCOUNTER — MYC MEDICAL ADVICE (OUTPATIENT)
Dept: FAMILY MEDICINE | Facility: OTHER | Age: 57
End: 2022-03-26
Payer: COMMERCIAL

## 2022-03-26 DIAGNOSIS — J30.2 SEASONAL ALLERGIES: Primary | ICD-10-CM

## 2022-03-28 RX ORDER — MONTELUKAST SODIUM 10 MG/1
TABLET ORAL
Qty: 30 TABLET | Refills: 5 | Status: SHIPPED | OUTPATIENT
Start: 2022-03-28 | End: 2023-02-27

## 2022-03-29 ENCOUNTER — OFFICE VISIT (OUTPATIENT)
Dept: FAMILY MEDICINE | Facility: OTHER | Age: 57
End: 2022-03-29
Attending: PHYSICIAN ASSISTANT
Payer: COMMERCIAL

## 2022-03-29 VITALS
SYSTOLIC BLOOD PRESSURE: 124 MMHG | WEIGHT: 209 LBS | TEMPERATURE: 98.9 F | RESPIRATION RATE: 14 BRPM | BODY MASS INDEX: 34.82 KG/M2 | OXYGEN SATURATION: 98 % | DIASTOLIC BLOOD PRESSURE: 88 MMHG | HEIGHT: 65 IN | HEART RATE: 66 BPM

## 2022-03-29 DIAGNOSIS — J01.00 ACUTE NON-RECURRENT MAXILLARY SINUSITIS: Primary | ICD-10-CM

## 2022-03-29 PROCEDURE — 99214 OFFICE O/P EST MOD 30 MIN: CPT | Performed by: PHYSICIAN ASSISTANT

## 2022-03-29 ASSESSMENT — PAIN SCALES - GENERAL: PAINLEVEL: MILD PAIN (3)

## 2022-03-29 NOTE — PROGRESS NOTES
Assessment & Plan   1. Acute non-recurrent maxillary sinusitis  Patient presents today with recurring sinus infection and cough. Differential includes sinusitis, bronchitis, viral URI, COVID, pneumonia. Based on symptoms present for greater than two weeks and exam, will treat as a sinusitis with Augmentin 875-125mg BID for 10 days. Cough likely related to postnasal drip. Discussed side effects of medication and return precautions. Patient in agreement with plan and will return if new or worsening symptoms arise.     - amoxicillin-clavulanate (AUGMENTIN) 875-125 MG tablet; Take 1 tablet by mouth 2 times daily  Dispense: 20 tablet; Refill: 0      Prescription drug management    RATNA Brito  I was present with the student who participated in the service and in the documentation of this note.  I have verified the history and personally performed a physical exam and medical decision making, and have verified the content of the note, which accurately reflects my assessment of the patient and the plan of care.    Kandy Blood PA-C  [facility]   Tea is a 56 year old who presents with concerns for a sinus infection. Symptoms have been consisting of yellow nasal discharge, mild otalgia, sore throat and a productive cough. Symptoms have been ongoing for 2 weeks. Nasal rinses worked early on, but are no longer helping. Afrin has worked to open up nasal passage. Associated postnasal drip. Has been experiencing nausea and diarrhea. Negative covid tests at home twice. Daughter is sick at home with similar symptoms. Has had sinus infections in the past that can progress to pneumonia, and been treated with Augmentin.     Denies fevers, chills, sweats, SOB, chest pain, vomiting, urinary symptoms.    No other concerns at this time.     History of Present Illness       Reason for visit:  Sinus infection  Symptom onset:  1-2 weeks ago  Symptoms include:  Stuffy nose, face  pain/pressure, cough  Symptom intensity:  Severe  Symptom progression:  Worsening  Had these symptoms before:  Yes  Has tried/received treatment for these symptoms:  No  What makes it worse:  Laying down  What makes it better:  Sinus rinse, decongestants, nose spray    She eats 2-3 servings of fruits and vegetables daily.She consumes 0 sweetened beverage(s) daily.She exercises with enough effort to increase her heart rate 20 to 29 minutes per day.  She exercises with enough effort to increase her heart rate 4 days per week.   She is taking medications regularly.         PAST MEDICAL HISTORY:   Past Medical History:   Diagnosis Date     Endometriosis      Hypertensive urgency 2019     Other abnormal glucose 2011     Pancreatitis due to biliary obstruction     improved after cholecystectomy       PAST SURGICAL HISTORY:   Past Surgical History:   Procedure Laterality Date      SECTION        SECTION       CHOLECYSTECTOMY       ENDOSCOPIC RELEASE CARPAL TUNNEL Right 2022    Procedure: RELEASE, CARPAL TUNNEL, ENDOSCOPIC;  Surgeon: Bal Cespedes MD;  Location: [facility] OR     HYSTERECTOMY TOTAL ABDOMINAL, BILATERAL SALPINGO-OOPHORECTOMY, COMBINED  2001    Endometriosis     LAPAROSCOPIC GASTRIC SLEEVE  2014    [place]     LAPAROSCOPY      D&C, exploratory,  placental tissue adhered to uterus     ORTHOPEDIC SURGERY  2015    rotator cuff tendon surgery Left        FAMILY HISTORY:   Family History   Problem Relation Age of Onset     Hypertension Mother      Heart Failure Mother         Congestive     Other - See Comments Mother         marcos danlos??  hypermobility     Lipids Father         Hyperlipidemia     Cancer Father         Skin     Hypertension Father      Prostate Cancer Father      Suicide Maternal Grandmother      Diabetes Maternal Grandfather      Clotting Disorder Maternal Grandfather      Diabetes Paternal Grandmother      Kidney Disease Paternal  Grandmother      Other - See Comments Paternal Grandfather         old age     Depression Brother      Diabetes Brother         type 2     Myocardial Infarction Brother 32        ETOHic, +tobacco     Peripheral Vascular Disease Brother      Hyperlipidemia Brother      Other - See Comments Sister         Post Partum DVT     Other Cancer Sister         retinal tear     Known Genetic Syndrome Daughter         marcos mitchell     Known Genetic Syndrome Daughter         marcos mitchell       SOCIAL HISTORY:   Social History     Tobacco Use     Smoking status: Never Smoker     Smokeless tobacco: Never Used   Substance Use Topics     Alcohol use: Not Currently     Alcohol/week: 0.0 standard drinks     Comment: rarely-3 drinks a year        Allergies   Allergen Reactions     Amitriptyline Other (See Comments) and Nausea     Nightmares     Ace Inhibitors Cough     Codeine Headache and Nausea     Headache and nausea      Flagyl [Metronidazole] Nausea     Morphine Nausea     Promethazine Nausea     nausea     Seasonal Unknown     Sulfa Drugs Unknown     Current Outpatient Medications   Medication     amoxicillin-clavulanate (AUGMENTIN) 875-125 MG tablet     baclofen (LIORESAL) 10 MG tablet     butalbital-acetaminophen-caffeine (ESGIC) -40 MG tablet     carvedilol (COREG) 12.5 MG tablet     cholecalciferol (VITAMIN D3) 5000 UNITS CAPS capsule     diazepam (VALIUM) 5 MG tablet     folic acid-vit B6-vit B12 (FOLGARD) 0.8-10-0.115 MG TABS     gabapentin (NEURONTIN) 400 MG capsule     hydrochlorothiazide (MICROZIDE) 12.5 MG capsule     losartan (COZAAR) 100 MG tablet     magnesium 250 MG tablet     montelukast (SINGULAIR) 10 MG tablet     Multiple Vitamins-Minerals (MULTIVITAMIN GUMMIES ADULT) CHEW     spironolactone (ALDACTONE) 25 MG tablet     No current facility-administered medications for this visit.         Review of Systems   Per HPI        Objective    /88   Pulse 66   Temp 98.9  F (37.2  C)   Resp 14   Ht 1.651  "m (5' 5\")   Wt 94.8 kg (209 lb)   SpO2 98%   Breastfeeding No   BMI 34.78 kg/m    Body mass index is 34.78 kg/m .  Physical Exam   General: Pleasant, in no apparent distress.  Eyes: Sclera are white and conjunctiva are clear bilaterally. Lacrimal apparatus free of erythema, edema, and discharge bilaterally.  Ears: External ears without erythema or edema. Tympanic membranes are pearly white and without erythema, scarring or perforations bilaterally. External auditory canals are free of foreign bodies, erythema, ulcers, and masses.  Oropharynx: Oral mucosa is pink and without ulcers, nodules, and white patches. Tongue is symmetrical, pink, and without masses or lesions. Pharynx is pink, symmetrical, and without lesions. Uvula is midline. Tonsils are pink, symmetrical, and without edema, ulcers, or exudates, and 1+ bilaterally.  Neck: No cervical lymphadenopathy on inspection and palpation.  Cardiovascular: Regular rate and rhythm with S1 equal to S2. No murmurs, friction rubs, or gallops.   Respiratory: Lungs are resonant and clear to auscultation bilaterally. No wheezes, crackles, or rhonchi.  Skin: No jaundice, pallor, rashes, or lesions.  Psych: Appropriate mood and affect.            "

## 2022-03-29 NOTE — NURSING NOTE
Patient presents to clinic with sinus pain and pressure x 2 weeks.  Bella Turner LPN ....................  3/29/2022   9:41 AM

## 2022-04-03 DIAGNOSIS — I10 ESSENTIAL HYPERTENSION, BENIGN: Chronic | ICD-10-CM

## 2022-04-04 ENCOUNTER — MYC MEDICAL ADVICE (OUTPATIENT)
Dept: FAMILY MEDICINE | Facility: OTHER | Age: 57
End: 2022-04-04
Payer: COMMERCIAL

## 2022-04-04 DIAGNOSIS — R05.9 COUGH: Primary | ICD-10-CM

## 2022-04-04 RX ORDER — HYDROCHLOROTHIAZIDE 12.5 MG/1
CAPSULE ORAL
Qty: 180 CAPSULE | Refills: 3 | Status: SHIPPED | OUTPATIENT
Start: 2022-04-04 | End: 2023-01-04

## 2022-04-04 RX ORDER — BENZONATATE 100 MG/1
100 CAPSULE ORAL 3 TIMES DAILY PRN
Qty: 30 CAPSULE | Refills: 0 | Status: SHIPPED | OUTPATIENT
Start: 2022-04-04 | End: 2022-10-14

## 2022-05-04 DIAGNOSIS — I10 ESSENTIAL HYPERTENSION, BENIGN: ICD-10-CM

## 2022-05-04 RX ORDER — LOSARTAN POTASSIUM 100 MG/1
TABLET ORAL
Qty: 90 TABLET | Refills: 3 | Status: SHIPPED | OUTPATIENT
Start: 2022-05-04 | End: 2023-02-27

## 2022-05-04 NOTE — PROGRESS NOTES
"  Assessment & Plan     1. Hip pain, left  Longstanding history of intermittent left hip pain after an initial injury 25+years ago that more recently has worsened in the past 3 weeks with pain and \"catching\". Differential includes osteoarthritis, tendonitis, bursitis, IT band syndrome, labral tear, etc. Exam and x-ray consistent with mild to moderate arthritis. Possibly some tendonitis as well. Given options to patient for pursuing PT or MRI for additional evaluation. Will place additional orders based on her preferences.   - XR Hip Left 2-3 Views; Future      Return if symptoms worsen or fail to improve.    Kandy Blood PA-C  [facility]    Ana Metcalf is a 56 year old who presents for the following health issues     History of Present Illness       Reason for visit:  Hip/groin pain  Symptom onset:  More than a month  Symptoms include:  Hip and groin pain, painful \"catch\"  Symptom intensity:  Moderate  Symptom progression:  Worsening  Had these symptoms before:  Yes  Has tried/received treatment for these symptoms:  Yes  Previous treatment was successful:  Yes  Prior treatment description:  Nsaids, stretching  What makes it worse:  Sitting, over exercising  What makes it better:  Sleeping with pillow between knees    She eats 2-3 servings of fruits and vegetables daily.She consumes 1 sweetened beverage(s) daily.She exercises with enough effort to increase her heart rate 10 to 19 minutes per day.  She exercises with enough effort to increase her heart rate 4 days per week.   She is taking medications regularly.     For evaluation of left hip pain.  Patient reports approximately 25 years ago she had an injury where she was diagnosed with a pulled groin.  Reports since that time she has struggled off and on with pain and \" catching\" in left hip.  Last few years the catching began.  Patient describes this as a feeling like something is rolling over something that it should not.  " Associated clicking.  Over the last 3 weeks this catching and pain is much more frequent occurring 3-4 times daily.  Most often when patient is going from sitting to standing and sometimes when pivoting while walking.  Managing with home exercises and stretches, over-the-counter analgesics as needed.  No overlying skin changes, numbness or tingling, weakness.  No recent injury.        PAST MEDICAL HISTORY:   Past Medical History:   Diagnosis Date     Endometriosis      Hypertensive urgency 2019     Other abnormal glucose 2011     Pancreatitis due to biliary obstruction     improved after cholecystectomy       PAST SURGICAL HISTORY:   Past Surgical History:   Procedure Laterality Date      SECTION        SECTION       CHOLECYSTECTOMY       ENDOSCOPIC RELEASE CARPAL TUNNEL Right 2022    Procedure: RELEASE, CARPAL TUNNEL, ENDOSCOPIC;  Surgeon: Bal Cespedes MD;  Location: [facility] OR     HYSTERECTOMY TOTAL ABDOMINAL, BILATERAL SALPINGO-OOPHORECTOMY, COMBINED  2001    Endometriosis     LAPAROSCOPIC GASTRIC SLEEVE  2014    [place]     LAPAROSCOPY      D&C, exploratory,  placental tissue adhered to uterus     ORTHOPEDIC SURGERY  2015    rotator cuff tendon surgery Left        FAMILY HISTORY:   Family History   Problem Relation Age of Onset     Hypertension Mother      Heart Failure Mother         Congestive     Other - See Comments Mother         marcos danlos??  hypermobility     Lipids Father         Hyperlipidemia     Cancer Father         Skin     Hypertension Father      Prostate Cancer Father      Suicide Maternal Grandmother      Diabetes Maternal Grandfather      Clotting Disorder Maternal Grandfather      Diabetes Paternal Grandmother      Kidney Disease Paternal Grandmother      Other - See Comments Paternal Grandfather         old age     Depression Brother      Diabetes Brother         type 2     Myocardial Infarction Brother 32        ETOHic, +tobacco      "Peripheral Vascular Disease Brother      Hyperlipidemia Brother      Other - See Comments Sister         Post Partum DVT     Other Cancer Sister         retinal tear     Known Genetic Syndrome Daughter         marcos mitchell     Known Genetic Syndrome Daughter         marcos mitchell       SOCIAL HISTORY:   Social History     Tobacco Use     Smoking status: Never Smoker     Smokeless tobacco: Never Used   Substance Use Topics     Alcohol use: Not Currently     Alcohol/week: 0.0 standard drinks     Comment: rarely-3 drinks a year        Allergies   Allergen Reactions     Amitriptyline Other (See Comments) and Nausea     Nightmares     Ace Inhibitors Cough     Codeine Headache and Nausea     Headache and nausea      Flagyl [Metronidazole] Nausea     Morphine Nausea     Promethazine Nausea     nausea     Seasonal Unknown     Sulfa Drugs Unknown     Current Outpatient Medications   Medication     baclofen (LIORESAL) 10 MG tablet     benzonatate (TESSALON) 100 MG capsule     butalbital-acetaminophen-caffeine (ESGIC) -40 MG tablet     carvedilol (COREG) 12.5 MG tablet     cholecalciferol (VITAMIN D3) 5000 UNITS CAPS capsule     diazepam (VALIUM) 5 MG tablet     folic acid-vit B6-vit B12 (FOLGARD) 0.8-10-0.115 MG TABS     gabapentin (NEURONTIN) 400 MG capsule     hydrochlorothiazide (MICROZIDE) 12.5 MG capsule     losartan (COZAAR) 100 MG tablet     magnesium 250 MG tablet     montelukast (SINGULAIR) 10 MG tablet     Multiple Vitamins-Minerals (MULTIVITAMIN GUMMIES ADULT) CHEW     spironolactone (ALDACTONE) 25 MG tablet     No current facility-administered medications for this visit.         Review of Systems   Per HPI        Objective    /80   Pulse 58   Temp 97.7  F (36.5  C)   Resp 14   Ht 1.651 m (5' 5\")   Wt 95.7 kg (211 lb)   SpO2 98%   Breastfeeding No   BMI 35.11 kg/m    Body mass index is 35.11 kg/m .  Physical Exam   General: Pleasant, in no apparent distress.  Musculoskeletal: Mild tenderness to " palpation over left lateral hip and left anterior groin line.  Pain with internal rotation of left hip.  There is somewhat limited internal rotation of left hip compared to right.  Otherwise full range of motion with flexion and extension and external rotation of hips bilaterally.  Nonantalgic gait in clinic.  Neurologic Exam: normal gross motor, tone coordination and no visible tremor.  Psych: Appropriate mood and affect.    No results found for any visits on 05/05/22.

## 2022-05-05 ENCOUNTER — HOSPITAL ENCOUNTER (OUTPATIENT)
Dept: GENERAL RADIOLOGY | Facility: OTHER | Age: 57
Discharge: HOME | End: 2022-05-05
Attending: PHYSICIAN ASSISTANT
Payer: COMMERCIAL

## 2022-05-05 ENCOUNTER — OFFICE VISIT (OUTPATIENT)
Dept: FAMILY MEDICINE | Facility: OTHER | Age: 57
End: 2022-05-05
Attending: PHYSICIAN ASSISTANT
Payer: COMMERCIAL

## 2022-05-05 ENCOUNTER — MYC MEDICAL ADVICE (OUTPATIENT)
Dept: FAMILY MEDICINE | Facility: OTHER | Age: 57
End: 2022-05-05

## 2022-05-05 VITALS
DIASTOLIC BLOOD PRESSURE: 80 MMHG | HEART RATE: 58 BPM | BODY MASS INDEX: 35.16 KG/M2 | HEIGHT: 65 IN | WEIGHT: 211 LBS | OXYGEN SATURATION: 98 % | RESPIRATION RATE: 14 BRPM | SYSTOLIC BLOOD PRESSURE: 128 MMHG | TEMPERATURE: 97.7 F

## 2022-05-05 DIAGNOSIS — M25.552 HIP PAIN, LEFT: Primary | ICD-10-CM

## 2022-05-05 DIAGNOSIS — M25.552 HIP PAIN, LEFT: ICD-10-CM

## 2022-05-05 PROCEDURE — 99213 OFFICE O/P EST LOW 20 MIN: CPT | Performed by: PHYSICIAN ASSISTANT

## 2022-05-05 PROCEDURE — 73502 X-RAY EXAM HIP UNI 2-3 VIEWS: CPT

## 2022-05-05 ASSESSMENT — PAIN SCALES - GENERAL: PAINLEVEL: MILD PAIN (3)

## 2022-05-05 NOTE — NURSING NOTE
"Patient presents to clinic with left hip pain. Patients states that the \"catching\" started started about 3 weeks ago.  Bella Turner, BUFFY ....................  5/5/2022   8:35 AM        "

## 2022-05-25 ENCOUNTER — TRANSFERRED RECORDS (OUTPATIENT)
Dept: HEALTH INFORMATION MANAGEMENT | Facility: OTHER | Age: 57
End: 2022-05-25
Payer: COMMERCIAL

## 2022-06-03 DIAGNOSIS — I10 ESSENTIAL HYPERTENSION, BENIGN: ICD-10-CM

## 2022-06-03 DIAGNOSIS — I51.89 DIASTOLIC DYSFUNCTION: ICD-10-CM

## 2022-06-03 DIAGNOSIS — I16.0 HYPERTENSIVE URGENCY: ICD-10-CM

## 2022-06-06 RX ORDER — CARVEDILOL 12.5 MG/1
TABLET ORAL
Qty: 180 TABLET | Refills: 0 | Status: SHIPPED | OUTPATIENT
Start: 2022-06-06 | End: 2022-09-12

## 2022-06-11 ENCOUNTER — VIRTUAL VISIT (OUTPATIENT)
Dept: URGENT CARE | Facility: CLINIC | Age: 57
End: 2022-06-11
Payer: COMMERCIAL

## 2022-06-11 DIAGNOSIS — U07.1 CLINICAL DIAGNOSIS OF COVID-19: Primary | ICD-10-CM

## 2022-06-11 PROCEDURE — 99213 OFFICE O/P EST LOW 20 MIN: CPT | Mod: CS

## 2022-06-11 NOTE — PROGRESS NOTES
"Tea is a 56 year old who is being evaluated via a billable phone visit.      Tested + today. Sx started on Thursday. Patient vaccinated and boosted.  Mild URI sx.  Exposed at party last Sunday.    Assessment & Plan     Clinical diagnosis of COVID-19    - nirmatrelvir and ritonavir (PAXLOVID) therapy pack; Take 3 tablets by mouth 2 times daily for 5 days Take 2 Nirmatrelvir tablets and 1 Ritonavir tablet twice daily for 5 days.[id number]}     COVID-19 positive patient.  Encounter for consideration of medication intervention. Patient does qualify for a prescription. Full discussion with patient including medication options, risks and benefits. Potential drug interactions reviewed with patient.     Treatment Planned RX sent to [facility] closed on weekends.    Temporary change to home medications:   Decrease dose of Valium by 50-75% while taking Paxlovid.    Estimated body mass index is 35.11 kg/m  as calculated from the following:    Height as of 5/5/22: 1.651 m (5' 5\").    Weight as of 5/5/22: 95.7 kg (211 lb).  GFR Estimate   Date Value Ref Range Status   02/24/2022 89 >60 mL/min/1.73m2 Final     Comment:     Effective December 21, 2021 eGFRcr in adults is calculated using the 2021 CKD-EPI creatinine equation which includes age and gender (Jocelyn et al., NEJ, DOI: 10.1056/EHRTvx0972912)   12/15/2020 83 >60 mL/min/[1.73_m2] Final     Comment:     Non  GFR Calc  Starting 12/18/2018, serum creatinine based estimated GFR (eGFR) will be   calculated using the Chronic Kidney Disease Epidemiology Collaboration   (CKD-EPI) equation.       Dolores Patiño MD  Virtual Urgent Care  [facility] VIRTUAL URGENT CARE    Subjective   Tea is a 56 year old who presents for the following health issues     HPI     Tested + today. Sx started on Thursday. Patient vaccinated and boosted.  Mild URI sx.  Exposed at party last Sunday.      Review of Systems   Constitutional, HEENT, " cardiovascular, pulmonary, GI, , musculoskeletal, neuro, skin, endocrine and psych systems are negative, except as otherwise noted.      Objective           Vitals:  No vitals were obtained today due to virtual visit.    Physical Exam   GENERAL: Healthy, alert and no distress  PSYCH: mentation appears normal and affect normal/bright    Phone call duration #10 minutes.

## 2022-08-03 ENCOUNTER — TRANSFERRED RECORDS (OUTPATIENT)
Dept: HEALTH INFORMATION MANAGEMENT | Facility: OTHER | Age: 57
End: 2022-08-03

## 2022-09-07 ENCOUNTER — TRANSFERRED RECORDS (OUTPATIENT)
Dept: HEALTH INFORMATION MANAGEMENT | Facility: OTHER | Age: 57
End: 2022-09-07

## 2022-09-10 DIAGNOSIS — I51.89 DIASTOLIC DYSFUNCTION: ICD-10-CM

## 2022-09-10 DIAGNOSIS — I16.0 HYPERTENSIVE URGENCY: ICD-10-CM

## 2022-09-10 DIAGNOSIS — I10 ESSENTIAL HYPERTENSION, BENIGN: ICD-10-CM

## 2022-09-12 RX ORDER — CARVEDILOL 12.5 MG/1
TABLET ORAL
Qty: 180 TABLET | Refills: 1 | Status: SHIPPED | OUTPATIENT
Start: 2022-09-12 | End: 2023-02-27

## 2022-10-07 ENCOUNTER — TRANSFERRED RECORDS (OUTPATIENT)
Dept: HEALTH INFORMATION MANAGEMENT | Facility: OTHER | Age: 57
End: 2022-10-07

## 2022-10-13 NOTE — PROGRESS NOTES
Assessment & Plan     1. Skin lesion  Skin lesion to top of scalp with recurring scabbing over followed by healing.  Referral to [facility] dermatology per patient request for consideration of excision.  - Adult Dermatology Referral; Future      Return if symptoms worsen or fail to improve.    Kandy Blood PA-C  [facility]    Subjective   Tea is a 56 year old, presenting for the following health issues:  Derm Problem (Would like a dermatology referral for a mole on her head)      History of Present Illness       Reason for visit:  Skin condition- mole on head  Symptom onset:  More than a month  Symptoms include:  Mole  Symptom intensity:  Mild  Symptom progression:  Staying the same  Had these symptoms before:  Omaira consumes 0 sweetened beverage(s) daily.She exercises with enough effort to increase her heart rate 20 to 29 minutes per day.  She exercises with enough effort to increase her heart rate 4 days per week.   She is taking medications regularly.     Has a skin lesion to the top of her scalp that she noticed a while ago.  Reports that her hairdresser initially noticed it.  She reports that it will often scab over and then heal in cycles.  Is not otherwise painful or bothersome.  Patient contacted [facility] who reports they have a dermatologist that she could see but she needs a referral for this.      PAST MEDICAL HISTORY:   Past Medical History:   Diagnosis Date     Endometriosis      Hypertensive urgency 2019     Other abnormal glucose 2011     Pancreatitis due to biliary obstruction     improved after cholecystectomy       PAST SURGICAL HISTORY:   Past Surgical History:   Procedure Laterality Date      SECTION        SECTION       CHOLECYSTECTOMY       ENDOSCOPIC RELEASE CARPAL TUNNEL Right 2022    Procedure: RELEASE, CARPAL TUNNEL, ENDOSCOPIC;  Surgeon: Bal Cespedes MD;  Location: [facility] OR     HYSTERECTOMY TOTAL  ABDOMINAL, BILATERAL SALPINGO-OOPHORECTOMY, COMBINED  01/01/2001    Endometriosis     LAPAROSCOPIC GASTRIC SLEEVE  1/2014    [place]     LAPAROSCOPY  1997    D&C, exploratory,  placental tissue adhered to uterus     ORTHOPEDIC SURGERY  2015    rotator cuff tendon surgery Left        FAMILY HISTORY:   Family History   Problem Relation Age of Onset     Hypertension Mother      Heart Failure Mother         Congestive     Other - See Comments Mother         marcos mitchell??  hypermobility     Lipids Father         Hyperlipidemia     Cancer Father         Skin     Hypertension Father      Prostate Cancer Father      Suicide Maternal Grandmother      Diabetes Maternal Grandfather      Clotting Disorder Maternal Grandfather      Diabetes Paternal Grandmother      Kidney Disease Paternal Grandmother      Other - See Comments Paternal Grandfather         old age     Depression Brother      Diabetes Brother         type 2     Myocardial Infarction Brother 32        ETOHic, +tobacco     Peripheral Vascular Disease Brother      Hyperlipidemia Brother      Other - See Comments Sister         Post Partum DVT     Other Cancer Sister         retinal tear     Known Genetic Syndrome Daughter         marcos mitchell     Known Genetic Syndrome Daughter         marcos mitchell       SOCIAL HISTORY:   Social History     Tobacco Use     Smoking status: Never     Smokeless tobacco: Never   Substance Use Topics     Alcohol use: Not Currently     Alcohol/week: 0.0 standard drinks     Comment: rarely-3 drinks a year        Allergies   Allergen Reactions     Amitriptyline Other (See Comments) and Nausea     Nightmares     Ace Inhibitors Cough     Codeine Headache and Nausea     Headache and nausea      Flagyl [Metronidazole] Nausea     Morphine Nausea     Promethazine Nausea     nausea     Seasonal Unknown     Sulfa Drugs Unknown     Current Outpatient Medications   Medication     baclofen (LIORESAL) 10 MG tablet      butalbital-acetaminophen-caffeine (ESGIC) -40 MG tablet     carvedilol (COREG) 12.5 MG tablet     cholecalciferol (VITAMIN D3) 5000 UNITS CAPS capsule     diazepam (VALIUM) 5 MG tablet     folic acid-vit B6-vit B12 (FOLGARD) 0.8-10-0.115 MG TABS     gabapentin (NEURONTIN) 400 MG capsule     hydrochlorothiazide (MICROZIDE) 12.5 MG capsule     losartan (COZAAR) 100 MG tablet     magnesium 250 MG tablet     montelukast (SINGULAIR) 10 MG tablet     Multiple Vitamins-Minerals (MULTIVITAMIN GUMMIES ADULT) CHEW     spironolactone (ALDACTONE) 25 MG tablet     benzonatate (TESSALON) 100 MG capsule     No current facility-administered medications for this visit.         Review of Systems   Per HPI        Objective    /72   Pulse 60   Temp 97.5  F (36.4  C) (Temporal)   Resp 16   Wt 91.9 kg (202 lb 9.6 oz)   SpO2 95%   Breastfeeding No   BMI 33.71 kg/m    Body mass index is 33.71 kg/m .  Physical Exam   General: Pleasant, in no apparent distress.  Skin: Raised pea-sized brown lesion to top of scalp  Psych: Appropriate mood and affect.

## 2022-10-14 ENCOUNTER — OFFICE VISIT (OUTPATIENT)
Dept: FAMILY MEDICINE | Facility: OTHER | Age: 57
End: 2022-10-14
Attending: PHYSICIAN ASSISTANT
Payer: COMMERCIAL

## 2022-10-14 VITALS
RESPIRATION RATE: 16 BRPM | OXYGEN SATURATION: 95 % | DIASTOLIC BLOOD PRESSURE: 72 MMHG | HEART RATE: 60 BPM | TEMPERATURE: 97.5 F | BODY MASS INDEX: 33.71 KG/M2 | WEIGHT: 202.6 LBS | SYSTOLIC BLOOD PRESSURE: 124 MMHG

## 2022-10-14 DIAGNOSIS — L98.9 SKIN LESION: Primary | ICD-10-CM

## 2022-10-14 PROCEDURE — 99213 OFFICE O/P EST LOW 20 MIN: CPT | Performed by: PHYSICIAN ASSISTANT

## 2022-10-14 ASSESSMENT — PAIN SCALES - GENERAL: PAINLEVEL: MILD PAIN (3)

## 2022-10-14 NOTE — PROGRESS NOTES
{PROVIDER CHARTING PREFERENCE:784197}    Subjective   Tea is a 56 year old, presenting for the following health issues:  Derm Problem (Would like a dermatology referral for a mole on her head)      History of Present Illness       Reason for visit:  Skin condition- mole on head  Symptom onset:  More than a month  Symptoms include:  Mole  Symptom intensity:  Mild  Symptom progression:  Staying the same  Had these symptoms before:  NoSbehzad consumes 0 sweetened beverage(s) daily.She exercises with enough effort to increase her heart rate 20 to 29 minutes per day.  She exercises with enough effort to increase her heart rate 4 days per week.   She is taking medications regularly.       {SUPERLIST (Optional):494410}  {additonal problems for provider to add (Optional):400835}    Review of Systems   {ROS COMP (Optional):781937}      Objective    /72   Pulse 60   Temp 97.5  F (36.4  C) (Temporal)   Resp 16   Wt 91.9 kg (202 lb 9.6 oz)   SpO2 95%   Breastfeeding No   BMI 33.71 kg/m    Body mass index is 33.71 kg/m .  Physical Exam   {Exam List (Optional):919228}    {Diagnostic Test Results (Optional):056403}    {AMBULATORY ATTESTATION (Optional):931868}

## 2022-10-14 NOTE — NURSING NOTE
"Chief Complaint   Patient presents with     Derm Problem     Would like a dermatology referral for a mole on her head       Initial /72   Pulse 60   Temp 97.5  F (36.4  C) (Temporal)   Resp 16   Wt 91.9 kg (202 lb 9.6 oz)   SpO2 95%   Breastfeeding No   BMI 33.71 kg/m   Estimated body mass index is 33.71 kg/m  as calculated from the following:    Height as of 5/5/22: 1.651 m (5' 5\").    Weight as of this encounter: 91.9 kg (202 lb 9.6 oz).  Medication Reconciliation: complete    FOOD SECURITY SCREENING QUESTIONS  Hunger Vital Signs:  Within the past 12 months we worried whether our food would run out before we got money to buy more. Never  Within the past 12 months the food we bought just didn't last and we didn't have money to get more. Never        Advance care directive on file? no  Advance care directive provided to patient? declined     Cyndi Gonzalez LPN  "

## 2022-11-17 ENCOUNTER — TRANSFERRED RECORDS (OUTPATIENT)
Dept: HEALTH INFORMATION MANAGEMENT | Facility: OTHER | Age: 57
End: 2022-11-17

## 2022-11-28 DIAGNOSIS — G43.809 OTHER MIGRAINE WITHOUT STATUS MIGRAINOSUS, NOT INTRACTABLE: ICD-10-CM

## 2022-11-28 RX ORDER — BUTALBITAL, ACETAMINOPHEN AND CAFFEINE 50; 325; 40 MG/1; MG/1; MG/1
TABLET ORAL
Qty: 36 TABLET | Refills: 0 | Status: SHIPPED | OUTPATIENT
Start: 2022-11-28 | End: 2023-02-27

## 2022-12-27 ENCOUNTER — TELEPHONE (OUTPATIENT)
Dept: FAMILY MEDICINE | Facility: OTHER | Age: 57
End: 2022-12-27

## 2022-12-27 NOTE — TELEPHONE ENCOUNTER
9:03 AM    Reason for Call: OVERBOOK    Patient is having the following symptoms: Lower back pain  for 1 1/2 weeks / walking with cane    The patient is requesting an appointment for ASAP with  Dr. Adame    Was an appointment offered for this call? No  If yes : Appointment type              Date    Preferred method for responding to this message: Telephone Call  What is your phone number ?  [phone]    If we cannot reach you directly, may we leave a detailed response at the number you provided? Yes    Can this message wait until your PCP/provider returns, if unavailable today? Not applicable,     Aleida Mukherjee

## 2022-12-29 ENCOUNTER — TRANSFERRED RECORDS (OUTPATIENT)
Dept: HEALTH INFORMATION MANAGEMENT | Facility: OTHER | Age: 57
End: 2022-12-29

## 2023-01-03 NOTE — PROGRESS NOTES
"  Assessment & Plan     Strain of lumbar region, initial encounter  Xray ok.  Done for stiff back for years but flare recently as described.  Over 2 weeks of pain at this point.  Started with right radiculopathy but that has improved and now just more in the bilateral back.  Xray showing loss of lordosis otherwise normal L spine.  Perhaps some OA of the facets at the L5/S1.  Steroid burst.  Continue baclofen and neurontin.  F/u pending ongoing sx.  Consider MRI if ongoing with the history of MS.  She is already doing PT as well.     - XR LUMBAR SPINE 2/3 VIEWS (Clinic Performed); Future  - predniSONE (DELTASONE) 20 MG tablet; Take 1 tablet (20 mg) by mouth 2 times daily for 5 days    MS (multiple sclerosis) (H)  As above.  Continue her normal meds for sx relief while we await her response to these conservative cares.                 BMI:   Estimated body mass index is 34.28 kg/m  as calculated from the following:    Height as of 5/5/22: 1.651 m (5' 5\").    Weight as of this encounter: 93.4 kg (206 lb).           No follow-ups on file.    Cholo Adame MD  [facility]   Tea is a 57 year old, presenting for the following health issues:  Back Pain      HPI     Back Pain       Duration: Dec 17        Specific cause: getting up out of a chair     Description:   Location of pain: low back bilateral  Character of pain: sharp, dull ache, cramping and spasm   Pain radiation:none  New numbness or weakness in legs, not attributed to pain:  no     Intensity: Currently 4/10    History:   Pain interferes with job: YES  History of back problems: no prior back problems  Any previous MRI or X-rays: None  Sees a specialist for back pain:  No  Therapies tried without relief: candice back and body, chiropractor and Physical Therapy, baclofen and gabapentin     Alleviating factors:   Improved by: baclofen and gabapentin       Precipitating factors:  Worsened by: Sitting              "         Review of Systems   Constitutional, HEENT, cardiovascular, pulmonary, gi and gu systems are negative, except as otherwise noted.      Objective    BP 98/62   Pulse 59   Temp 98  F (36.7  C) (Tympanic)   Wt 93.4 kg (206 lb)   SpO2 97%   BMI 34.28 kg/m    Body mass index is 34.28 kg/m .  Physical Exam   GENERAL: healthy, alert and no distress  NECK: no adenopathy, no asymmetry, masses, or scars and thyroid normal to palpation  RESP: lungs clear to auscultation - no rales, rhonchi or wheezes  CV: regular rate and rhythm, normal S1 S2, no S3 or S4, no murmur, click or rub, no peripheral edema and peripheral pulses strong  ABDOMEN: soft, nontender, no hepatosplenomegaly, no masses and bowel sounds normal  MS: walks with a limp and moves slowly overall.  No gross weakness or abnormality noted.      Lumbar xray reviewed with her directly as above.

## 2023-01-04 ENCOUNTER — ANCILLARY PROCEDURE (OUTPATIENT)
Dept: GENERAL RADIOLOGY | Facility: OTHER | Age: 58
End: 2023-01-04
Attending: FAMILY MEDICINE
Payer: COMMERCIAL

## 2023-01-04 ENCOUNTER — OFFICE VISIT (OUTPATIENT)
Dept: FAMILY MEDICINE | Facility: OTHER | Age: 58
End: 2023-01-04
Attending: FAMILY MEDICINE
Payer: COMMERCIAL

## 2023-01-04 VITALS
SYSTOLIC BLOOD PRESSURE: 98 MMHG | BODY MASS INDEX: 34.28 KG/M2 | WEIGHT: 206 LBS | OXYGEN SATURATION: 97 % | DIASTOLIC BLOOD PRESSURE: 62 MMHG | HEART RATE: 59 BPM | TEMPERATURE: 98 F

## 2023-01-04 DIAGNOSIS — S39.012A STRAIN OF LUMBAR REGION, INITIAL ENCOUNTER: Primary | ICD-10-CM

## 2023-01-04 DIAGNOSIS — I10 ESSENTIAL HYPERTENSION, BENIGN: Chronic | ICD-10-CM

## 2023-01-04 DIAGNOSIS — G35 MS (MULTIPLE SCLEROSIS) (H): Chronic | ICD-10-CM

## 2023-01-04 DIAGNOSIS — S39.012A STRAIN OF LUMBAR REGION, INITIAL ENCOUNTER: ICD-10-CM

## 2023-01-04 PROCEDURE — 72100 X-RAY EXAM L-S SPINE 2/3 VWS: CPT | Mod: TC | Performed by: RADIOLOGY

## 2023-01-04 PROCEDURE — 99214 OFFICE O/P EST MOD 30 MIN: CPT | Performed by: FAMILY MEDICINE

## 2023-01-04 RX ORDER — HYDROCHLOROTHIAZIDE 12.5 MG/1
CAPSULE ORAL
Qty: 180 CAPSULE | Refills: 3 | Status: SHIPPED | OUTPATIENT
Start: 2023-01-04 | End: 2024-03-06

## 2023-01-04 RX ORDER — PREDNISONE 20 MG/1
20 TABLET ORAL 2 TIMES DAILY
Qty: 10 TABLET | Refills: 0 | Status: SHIPPED | OUTPATIENT
Start: 2023-01-04 | End: 2023-01-09

## 2023-01-04 ASSESSMENT — PAIN SCALES - GENERAL: PAINLEVEL: MODERATE PAIN (4)

## 2023-02-24 ASSESSMENT — ENCOUNTER SYMPTOMS
PARESTHESIAS: 1
MYALGIAS: 1
HEMATURIA: 0
HEMATOCHEZIA: 0
CHILLS: 0
EYE PAIN: 0
SORE THROAT: 0
FREQUENCY: 0
DIZZINESS: 0
DIARRHEA: 0
PALPITATIONS: 0
DYSURIA: 0
NERVOUS/ANXIOUS: 0
ABDOMINAL PAIN: 0
BREAST MASS: 0
ARTHRALGIAS: 1
COUGH: 0
WEAKNESS: 1
JOINT SWELLING: 1
HEADACHES: 1
NAUSEA: 0
SHORTNESS OF BREATH: 1
HEARTBURN: 0
CONSTIPATION: 0
FEVER: 0

## 2023-02-27 ENCOUNTER — OFFICE VISIT (OUTPATIENT)
Dept: FAMILY MEDICINE | Facility: OTHER | Age: 58
End: 2023-02-27
Attending: FAMILY MEDICINE
Payer: COMMERCIAL

## 2023-02-27 VITALS
DIASTOLIC BLOOD PRESSURE: 64 MMHG | HEART RATE: 52 BPM | WEIGHT: 202 LBS | HEIGHT: 65 IN | BODY MASS INDEX: 33.66 KG/M2 | SYSTOLIC BLOOD PRESSURE: 116 MMHG | TEMPERATURE: 97.7 F | OXYGEN SATURATION: 98 %

## 2023-02-27 DIAGNOSIS — Z23 NEED FOR VACCINATION: ICD-10-CM

## 2023-02-27 DIAGNOSIS — G35 MS (MULTIPLE SCLEROSIS) (H): Chronic | ICD-10-CM

## 2023-02-27 DIAGNOSIS — J30.2 SEASONAL ALLERGIES: ICD-10-CM

## 2023-02-27 DIAGNOSIS — Z83.3 FAMILY HISTORY OF DIABETES MELLITUS: ICD-10-CM

## 2023-02-27 DIAGNOSIS — Z12.11 COLON CANCER SCREENING: ICD-10-CM

## 2023-02-27 DIAGNOSIS — E03.8 SUBCLINICAL HYPOTHYROIDISM: ICD-10-CM

## 2023-02-27 DIAGNOSIS — I51.89 DIASTOLIC DYSFUNCTION: ICD-10-CM

## 2023-02-27 DIAGNOSIS — G43.809 OTHER MIGRAINE WITHOUT STATUS MIGRAINOSUS, NOT INTRACTABLE: ICD-10-CM

## 2023-02-27 DIAGNOSIS — I10 ESSENTIAL HYPERTENSION, BENIGN: ICD-10-CM

## 2023-02-27 DIAGNOSIS — E78.5 HYPERLIPIDEMIA LDL GOAL <130: Primary | ICD-10-CM

## 2023-02-27 DIAGNOSIS — I16.0 HYPERTENSIVE URGENCY: ICD-10-CM

## 2023-02-27 LAB
ALBUMIN SERPL BCG-MCNC: 4.2 G/DL (ref 3.5–5.2)
ALP SERPL-CCNC: 98 U/L (ref 35–104)
ALT SERPL W P-5'-P-CCNC: 14 U/L (ref 10–35)
ANION GAP SERPL CALCULATED.3IONS-SCNC: 13 MMOL/L (ref 7–15)
AST SERPL W P-5'-P-CCNC: 14 U/L (ref 10–35)
BILIRUB SERPL-MCNC: 0.3 MG/DL
BUN SERPL-MCNC: 17.7 MG/DL (ref 6–20)
CALCIUM SERPL-MCNC: 9.9 MG/DL (ref 8.6–10)
CHLORIDE SERPL-SCNC: 100 MMOL/L (ref 98–107)
CHOLEST SERPL-MCNC: 249 MG/DL
CREAT SERPL-MCNC: 0.9 MG/DL (ref 0.51–0.95)
DEPRECATED HCO3 PLAS-SCNC: 28 MMOL/L (ref 22–29)
EST. AVERAGE GLUCOSE BLD GHB EST-MCNC: 111 MG/DL
GFR SERPL CREATININE-BSD FRML MDRD: 74 ML/MIN/1.73M2
GLUCOSE SERPL-MCNC: 94 MG/DL (ref 70–99)
HBA1C MFR BLD: 5.5 %
HDLC SERPL-MCNC: 56 MG/DL
LDLC SERPL CALC-MCNC: 156 MG/DL
NONHDLC SERPL-MCNC: 193 MG/DL
POTASSIUM SERPL-SCNC: 3.9 MMOL/L (ref 3.4–5.3)
PROT SERPL-MCNC: 7.2 G/DL (ref 6.4–8.3)
SODIUM SERPL-SCNC: 141 MMOL/L (ref 136–145)
TRIGL SERPL-MCNC: 187 MG/DL
TSH SERPL DL<=0.005 MIU/L-ACNC: 3.23 UIU/ML (ref 0.3–4.2)

## 2023-02-27 PROCEDURE — 36415 COLL VENOUS BLD VENIPUNCTURE: CPT | Performed by: FAMILY MEDICINE

## 2023-02-27 PROCEDURE — 84443 ASSAY THYROID STIM HORMONE: CPT | Performed by: FAMILY MEDICINE

## 2023-02-27 PROCEDURE — 99396 PREV VISIT EST AGE 40-64: CPT | Mod: 25 | Performed by: FAMILY MEDICINE

## 2023-02-27 PROCEDURE — 80053 COMPREHEN METABOLIC PANEL: CPT | Performed by: FAMILY MEDICINE

## 2023-02-27 PROCEDURE — 90471 IMMUNIZATION ADMIN: CPT | Performed by: FAMILY MEDICINE

## 2023-02-27 PROCEDURE — 90750 HZV VACC RECOMBINANT IM: CPT | Performed by: FAMILY MEDICINE

## 2023-02-27 PROCEDURE — 80061 LIPID PANEL: CPT | Performed by: FAMILY MEDICINE

## 2023-02-27 PROCEDURE — 83036 HEMOGLOBIN GLYCOSYLATED A1C: CPT | Performed by: FAMILY MEDICINE

## 2023-02-27 RX ORDER — BUTALBITAL, ACETAMINOPHEN AND CAFFEINE 50; 325; 40 MG/1; MG/1; MG/1
TABLET ORAL
Qty: 36 TABLET | Refills: 0 | Status: SHIPPED | OUTPATIENT
Start: 2023-02-27 | End: 2023-06-16

## 2023-02-27 RX ORDER — CARVEDILOL 12.5 MG/1
12.5 TABLET ORAL 2 TIMES DAILY WITH MEALS
Qty: 180 TABLET | Refills: 3 | Status: SHIPPED | OUTPATIENT
Start: 2023-02-27 | End: 2023-03-10

## 2023-02-27 RX ORDER — MONTELUKAST SODIUM 10 MG/1
TABLET ORAL
Qty: 90 TABLET | Refills: 3 | Status: SHIPPED | OUTPATIENT
Start: 2023-02-27

## 2023-02-27 RX ORDER — LOSARTAN POTASSIUM 100 MG/1
100 TABLET ORAL DAILY
Qty: 90 TABLET | Refills: 3 | Status: SHIPPED | OUTPATIENT
Start: 2023-02-27 | End: 2024-05-02

## 2023-02-27 RX ORDER — SPIRONOLACTONE 25 MG/1
25 TABLET ORAL DAILY
Qty: 90 TABLET | Refills: 3 | Status: SHIPPED | OUTPATIENT
Start: 2023-02-27 | End: 2023-03-10

## 2023-02-27 ASSESSMENT — PAIN SCALES - GENERAL: PAINLEVEL: NO PAIN (0)

## 2023-02-27 NOTE — PROGRESS NOTES
SUBJECTIVE:   CC: Tea is an 57 year old who presents for preventive health visit.       Healthy Habits:     Getting at least 3 servings of Calcium per day:  Yes    Bi-annual eye exam:  Yes    Dental care twice a year:  Yes    Sleep apnea or symptoms of sleep apnea:  None    Diet:  Regular (no restrictions)    Frequency of exercise:  1 day/week    Duration of exercise:  15-30 minutes    Medication side effects:  None    PHQ-2 Total Score: 0    Additional concerns today:  No              Today's PHQ-2 Score:   PHQ-2 ( 1999 [employer]) 2/24/2023   Q1: Little interest or pleasure in doing things 0   Q2: Feeling down, depressed or hopeless 0   PHQ-2 Score 0   PHQ-2 Total Score (12-17 Years)- Positive if 3 or more points; Administer PHQ-A if positive -   Q1: Little interest or pleasure in doing things Not at all   Q2: Feeling down, depressed or hopeless Not at all   PHQ-2 Score 0           Social History     Tobacco Use     Smoking status: Never     Smokeless tobacco: Never   Substance Use Topics     Alcohol use: Not Currently     Alcohol/week: 0.0 standard drinks     Comment: rarely-3 drinks a year         Alcohol Use 2/24/2023   Prescreen: >3 drinks/day or >7 drinks/week? Not Applicable   Prescreen: >3 drinks/day or >7 drinks/week? -       Reviewed orders with patient.  Reviewed health maintenance and updated orders accordingly -       Breast Cancer Screening:    Breast CA Risk Assessment (FHS-7) 2/24/2021   Do you have a family history of breast, colon, or ovarian cancer? No / Unknown         Mammogram Screening: Recommended mammography every 1-2 years with patient discussion and risk factor consideration  Pertinent mammograms are reviewed under the imaging tab.    History of abnormal Pap smear: Status post benign hysterectomy. Health Maintenance and Surgical History updated.     Reviewed and updated as needed this visit by clinical staff   Tobacco  Allergies  Meds              Reviewed and updated as needed this  "visit by Provider                 Past Medical History:   Diagnosis Date     Endometriosis      Hypertensive urgency 2019     Other abnormal glucose 2011     Pancreatitis due to biliary obstruction     improved after cholecystectomy      Past Surgical History:   Procedure Laterality Date      SECTION        SECTION       CHOLECYSTECTOMY       ENDOSCOPIC RELEASE CARPAL TUNNEL Right 2022    Procedure: RELEASE, CARPAL TUNNEL, ENDOSCOPIC;  Surgeon: Bal Cespedes MD;  Location: [facility] OR     HYSTERECTOMY TOTAL ABDOMINAL, BILATERAL SALPINGO-OOPHORECTOMY, COMBINED  2001    Endometriosis     LAPAROSCOPIC GASTRIC SLEEVE  2014    [place]     LAPAROSCOPY      D&C, exploratory,  placental tissue adhered to uterus     ORTHOPEDIC SURGERY  2015    rotator cuff tendon surgery Left        Review of Systems  CONSTITUTIONAL: NEGATIVE for fever, chills, change in weight  INTEGUMENTARY/SKIN: NEGATIVE for worrisome rashes, moles or lesions  EYES: NEGATIVE for vision changes or irritation  ENT: NEGATIVE for ear, mouth and throat problems  RESP: NEGATIVE for significant cough or SOB  BREAST: NEGATIVE for masses, tenderness or discharge  CV: NEGATIVE for chest pain, palpitations or peripheral edema  GI: NEGATIVE for nausea, abdominal pain, heartburn, or change in bowel habits  : NEGATIVE for unusual urinary or vaginal symptoms. No vaginal bleeding.  MUSCULOSKELETAL: NEGATIVE for significant arthralgias or myalgia  NEURO: NEGATIVE for weakness, dizziness or paresthesias  PSYCHIATRIC: NEGATIVE for changes in mood or affect      OBJECTIVE:   /64   Pulse 52   Temp 97.7  F (36.5  C) (Tympanic)   Ht 1.638 m (5' 4.5\")   Wt 91.6 kg (202 lb)   SpO2 98%   BMI 34.14 kg/m    Physical Exam  GENERAL: healthy, alert and no distress  EYES: Eyes grossly normal to inspection, PERRL and conjunctivae and sclerae normal  HENT: ear canals and TM's normal, nose and mouth without ulcers or " "lesions  NECK: no adenopathy, no asymmetry, masses, or scars and thyroid normal to palpation  RESP: lungs clear to auscultation - no rales, rhonchi or wheezes  CV: regular rate and rhythm, normal S1 S2, no S3 or S4, no murmur, click or rub, no peripheral edema and peripheral pulses strong  ABDOMEN: soft, nontender, no hepatosplenomegaly, no masses and bowel sounds normal  MS: no gross musculoskeletal defects noted, no edema  SKIN: no suspicious lesions or rashes  NEURO: Normal strength and tone, mentation intact and speech normal  PSYCH: mentation appears normal, affect normal/bright    Diagnostic Test Results:  Labs reviewed in Epic    ASSESSMENT/PLAN:       ICD-10-CM    1. Hyperlipidemia LDL goal <130  E78.5 Comprehensive metabolic panel     Lipid Profile      2. Subclinical hypothyroidism  E03.8 TSH with free T4 reflex      3. Need for vaccination  Z23 ZOSTER VACCINE RECOMBINANT ADJUVANTED ([employer])      4. Family history of diabetes mellitus  Z83.3 Hemoglobin A1c      5. MS (multiple sclerosis) (H)  G35       6. Colon cancer screening  Z12.11 COLOGVASYL([employer])                COUNSELING:  Reviewed preventive health counseling, as reflected in patient instructions      BMI:   Estimated body mass index is 34.14 kg/m  as calculated from the following:    Height as of this encounter: 1.638 m (5' 4.5\").    Weight as of this encounter: 91.6 kg (202 lb).         She reports that she has never smoked. She has never used smokeless tobacco.          Cholo Adame MD  [facility]  "

## 2023-03-09 DIAGNOSIS — I10 ESSENTIAL HYPERTENSION, BENIGN: ICD-10-CM

## 2023-03-09 DIAGNOSIS — I16.0 HYPERTENSIVE URGENCY: ICD-10-CM

## 2023-03-09 DIAGNOSIS — I51.89 DIASTOLIC DYSFUNCTION: ICD-10-CM

## 2023-03-10 RX ORDER — CARVEDILOL 12.5 MG/1
TABLET ORAL
Qty: 180 TABLET | Refills: 3 | Status: SHIPPED | OUTPATIENT
Start: 2023-03-10 | End: 2024-01-24

## 2023-03-10 RX ORDER — SPIRONOLACTONE 25 MG/1
TABLET ORAL
Qty: 90 TABLET | Refills: 3 | Status: SHIPPED | OUTPATIENT
Start: 2023-03-10 | End: 2024-05-31

## 2023-03-10 NOTE — TELEPHONE ENCOUNTER
Aldacatone       Last Written Prescription Date:  2/27/2023  Last Fill Quantity: 90,   # refills: 3  Last Office Visit: 2/27/2023  Future Office visit:       Coreg      Last Written Prescription Date:  2/27/2023  Last Fill Quantity: 180,   # refills: 3  Last Office Visit: 2/27/2023  Future Office visit:       Medication recently filled for year supply

## 2023-04-10 DIAGNOSIS — Z12.11 COLON CANCER SCREENING: ICD-10-CM

## 2023-04-25 LAB — NONINV COLON CA DNA+OCC BLD SCRN STL QL: NEGATIVE

## 2023-05-03 DIAGNOSIS — I10 ESSENTIAL HYPERTENSION, BENIGN: ICD-10-CM

## 2023-05-03 RX ORDER — LOSARTAN POTASSIUM 100 MG/1
TABLET ORAL
Qty: 90 TABLET | Refills: 3 | OUTPATIENT
Start: 2023-05-03

## 2023-05-11 ENCOUNTER — MYC MEDICAL ADVICE (OUTPATIENT)
Dept: FAMILY MEDICINE | Facility: OTHER | Age: 58
End: 2023-05-11

## 2023-05-11 DIAGNOSIS — S39.012A STRAIN OF LUMBAR REGION, INITIAL ENCOUNTER: Primary | ICD-10-CM

## 2023-05-11 RX ORDER — PREDNISONE 20 MG/1
20 TABLET ORAL 2 TIMES DAILY
Qty: 10 TABLET | Refills: 0 | Status: SHIPPED | OUTPATIENT
Start: 2023-05-11 | End: 2023-05-11

## 2023-05-11 RX ORDER — PREDNISONE 20 MG/1
20 TABLET ORAL 2 TIMES DAILY
Qty: 10 TABLET | Refills: 0 | Status: SHIPPED | OUTPATIENT
Start: 2023-05-11 | End: 2024-02-28

## 2023-06-16 DIAGNOSIS — G43.809 OTHER MIGRAINE WITHOUT STATUS MIGRAINOSUS, NOT INTRACTABLE: ICD-10-CM

## 2023-06-16 RX ORDER — BUTALBITAL, ACETAMINOPHEN AND CAFFEINE 50; 325; 40 MG/1; MG/1; MG/1
TABLET ORAL
Qty: 36 TABLET | Refills: 0 | Status: SHIPPED | OUTPATIENT
Start: 2023-06-16 | End: 2023-09-13

## 2023-06-16 NOTE — TELEPHONE ENCOUNTER
Esgic      Last Written Prescription Date:  2.27.23  Last Fill Quantity: #36,   # refills: 0  Last Office Visit: 2.27.23  Future Office visit:       Routing refill request to provider for review/approval because:  Drug not on the G, P or [facility] refill protocol or controlled substance

## 2023-07-26 ENCOUNTER — TELEPHONE (OUTPATIENT)
Dept: FAMILY MEDICINE | Facility: OTHER | Age: 58
End: 2023-07-26

## 2023-07-26 NOTE — TELEPHONE ENCOUNTER
8:03 AM    Reason for Call: OVERBOOK    Patient is having the following symptoms: Side pain due to your conditions  for 4-5 days , not getting better  .    The patient is requesting an appointment for Today with Jill Correa.    Was an appointment offered for this call? No  If yes : Appointment type              Date    Preferred method for responding to this message: Telephone Call  What is your phone number ?   [phone]     If we cannot reach you directly, may we leave a detailed response at the number you provided? Yes    Can this message wait until your PCP/provider returns, if unavailable today? Not applicable,     Aleida Mukherjee

## 2023-08-01 NOTE — TELEPHONE ENCOUNTER
Please review [employer] message, rx pended.   [facility] Pre-Admission Testing Electronic Communication Worksheet for OR/ENDO Procedures        Patient: Franck Staley    DOS: 8/8/23    Arrival Time: per Wendi Smack    Surgery Time:    Meds to Bed:  [x] YES    []  NO    Transportation Confirmed: [x] YES    []  NO    History and Physical:  [] YES    []  NO  [] N/A  If yes, please list doctor or Urgent Care and date of H&P:     Additional Clearance(Cardiac, Pulmonary, etc):  [] YES    []  NO    Pre-Admission Testing Visit:  [] YES    [x]  NO If no, do labs/testing need to be done DOS? [] YES    []  NO    Medication Reconciliation Complete:  [x] YES    []  NO        Additional Notes:                Interview Complete: [x] YES    []  NO          Tal Shirley RN  11:46 AM C-diff Questionnaire:     * Admitted with diarrhea? [] YES    []  NO     *Prior history of C-Diff. In last 3 months? [] YES    []  NO     *Antibiotic use in the past 6-8 weeks? []  NO    []  YES      If yes, which: REASON_________________     *Prior hospitalization or nursing home in the last month? []  YES    []  NO     SAFETY FIRST. .call before you fall    [address] time____________        Surgery time____________    Do not eat or drink anything after 12:00 midnight prior to your surgery. This includes water chewing gum, mints and ice chips- the Day of Surgery. You may brush your teeth and gargle the morning of your surgery, but do not swallow the water     Please see your family doctor/pediatrician for a history and physical and/or questions concerning medications. Bring any test results/reports from your physicians office.    If you are under the care of a heart doctor or specialist doctor, please be aware that you may be asked to them for clearance    You may be asked to stop blood thinners such as Coumadin, Plavix, Fragmin, Lovenox, etc., or any anti-inflammatories such as:  Aspirin, Ibuprofen, Advil, Naproxen Advil, Naproxen prior to your surgery. We also ask that you stop any OTC medications such as fish oil, vitamin E, glucosamine, garlic, Multivitamins, COQ 10, etc.    We ask that you do not smoke 24 hours prior to surgery  We ask that you do not  drink any alcoholic beverages 24 hours prior to surgery     You must make arrangements for a responsible adult to take you home after your surgery. For your safety you will not be allowed to leave alone or drive yourself home. Your surgery will be cancelled if you do not have a ride home. Also for your safety, it is strongly suggested that someone stay with you the first 24 hours after your surgery. A parent or legal guardian must accompany a child scheduled for surgery and plan to stay at the hospital until the child is discharged. Please do not bring other children with you. For your comfort, please wear simple loose fitting clothing to the hospital.  Please do not bring valuables. Do not wear any make-up or nail polish on your fingers or toes. For your safety, please do not wear any jewelry or body piercing's on the day of surgery. All jewelry must be removed. If you have dentures, they will be removed before going to operating room. For your convenience, we will provide you with a container. If you wear contact lenses or glasses, they will be removed, please bring a case for them. If you have a living will and a durable power of  for healthcare, please bring in a copy. As part of our patient safety program to minimize surgical site infections, we ask you to do the following:    Please notify your surgeon if you develop any illness between         now and the day of your surgery. This includes a cough, cold, fever, sore throat, nausea,         or vomiting, and diarrhea, etc.   Please notify your surgeon if you experience dizziness, shortness         of breath or blurred vision between now and the time of your surgery.

## 2023-08-04 NOTE — PROGRESS NOTES
Assessment & Plan   1. Costochondritis  Flare related to chronic MS. Managing with OTC analgesics with minimal improvement. Burst of prednisone as below as she has tolerated this well in the past. Consider chiropractor or PT if minimal improvement.   - predniSONE (DELTASONE) 20 MG tablet; Take 1 tablet (20 mg) by mouth 2 times daily  Dispense: 10 tablet; Refill: 0    2. Infection due to 2019 novel coronavirus  Improved. Educated on post viral course and symptomatic management. Follow up as needed.       No follow-ups on file.    Kandy Blood PA-C  [facility]    Subjective   Tea is a 57 year old, presenting for the following health issues:  Chest Pain      History of Present Illness       Reason for visit:  Rib pain  Symptom onset:  More than a month  Symptoms include:  Rib pain, chronic  Symptom intensity:  Severe  Symptom progression:  Improving  Had these symptoms before:  Yes  Has tried/received treatment for these symptoms:  Yes  Previous treatment was successful:  No  What makes it worse:  Sitting  What makes it better:  Tylenol, laying on side, tens unit    She eats 2-3 servings of fruits and vegetables daily.She consumes 0 sweetened beverage(s) daily.She exercises with enough effort to increase her heart rate 10 to 19 minutes per day.  She exercises with enough effort to increase her heart rate 4 days per week.   She is taking medications regularly.    Here for flare of chronic costochondritis related to MS.  Patient reports she had a COVID infection a couple weeks ago which seem to flare costochondritis.  She is still having some pain throughout her lateral and anterior ribs in her chest.  Managing with Tylenol, gabapentin, baclofen, TENS unit with minimal improvement.  She had previously used a course of steroids which has helped in the past.  She is also noticing Continued cough with phlegm since her COVID infection on 7/16.  She does feel that she is otherwise improving  regarding her COVID infection.    PAST MEDICAL HISTORY:   Past Medical History:   Diagnosis Date    Endometriosis     Hypertensive urgency 2019    Other abnormal glucose 2011    Pancreatitis due to biliary obstruction     improved after cholecystectomy       PAST SURGICAL HISTORY:   Past Surgical History:   Procedure Laterality Date     SECTION       SECTION      CHOLECYSTECTOMY  2012    ENDOSCOPIC RELEASE CARPAL TUNNEL Right 2022    Procedure: RELEASE, CARPAL TUNNEL, ENDOSCOPIC;  Surgeon: Bal Cespedes MD;  Location: [facility] OR    HYSTERECTOMY TOTAL ABDOMINAL, BILATERAL SALPINGO-OOPHORECTOMY, COMBINED  2001    Endometriosis    LAPAROSCOPIC GASTRIC SLEEVE  2014    [place]    LAPAROSCOPY      D&C, exploratory,  placental tissue adhered to uterus    ORTHOPEDIC SURGERY  2015    rotator cuff tendon surgery Left        FAMILY HISTORY:   Family History   Problem Relation Age of Onset    Hypertension Mother     Heart Failure Mother         Congestive    Other - See Comments Mother         marcos danlos??  hypermobility    Lipids Father         Hyperlipidemia    Cancer Father         Skin    Hypertension Father     Prostate Cancer Father     Suicide Maternal Grandmother     Diabetes Maternal Grandfather     Clotting Disorder Maternal Grandfather     Diabetes Paternal Grandmother     Kidney Disease Paternal Grandmother     Other - See Comments Paternal Grandfather         old age    Depression Brother     Diabetes Brother         type 2    Myocardial Infarction Brother 32        ETOHic, +tobacco    Peripheral Vascular Disease Brother     Hyperlipidemia Brother     Other - See Comments Sister         Post Partum DVT    Other Cancer Sister         retinal tear    Known Genetic Syndrome Daughter         marcos danlos    Known Genetic Syndrome Daughter         marcos danlos       SOCIAL HISTORY:   Social History     Tobacco Use    Smoking status: Never    Smokeless tobacco:  "Never   Substance Use Topics    Alcohol use: Not Currently     Alcohol/week: 0.0 standard drinks of alcohol     Comment: rarely-3 drinks a year        Allergies   Allergen Reactions    Amitriptyline Other (See Comments) and Nausea     Nightmares    Ace Inhibitors Cough    Codeine Headache and Nausea     Headache and nausea     Flagyl [Metronidazole] Nausea    Morphine Nausea    Promethazine Nausea     nausea    Seasonal Unknown    Sulfa Antibiotics Unknown     Current Outpatient Medications   Medication    baclofen (LIORESAL) 10 MG tablet    butalbital-acetaminophen-caffeine (ESGIC) -40 MG tablet    carvedilol (COREG) 12.5 MG tablet    cholecalciferol (VITAMIN D3) 5000 UNITS CAPS capsule    diazepam (VALIUM) 5 MG tablet    folic acid-vit B6-vit B12 (FOLGARD) 0.8-10-0.115 MG TABS    gabapentin (NEURONTIN) 400 MG capsule    hydrochlorothiazide (MICROZIDE) 12.5 MG capsule    losartan (COZAAR) 100 MG tablet    magnesium 250 MG tablet    montelukast (SINGULAIR) 10 MG tablet    Multiple Vitamins-Minerals (MULTIVITAMIN GUMMIES ADULT) CHEW    predniSONE (DELTASONE) 20 MG tablet    predniSONE (DELTASONE) 20 MG tablet    spironolactone (ALDACTONE) 25 MG tablet     No current facility-administered medications for this visit.         Review of Systems   Per HPI        Objective    /72   Pulse 64   Temp 97  F (36.1  C)   Resp 14   Ht 1.638 m (5' 4.5\")   Wt 94.8 kg (209 lb)   SpO2 99%   BMI 35.32 kg/m    Body mass index is 35.32 kg/m .  Physical Exam   General: Pleasant, in no apparent distress.  Musculoskeletal: Tenderness palpation throughout lateral and anterior ribs.  Skin: No jaundice, pallor, rashes, or lesions on exposed skin.   Psych: Appropriate mood and affect.      "

## 2023-08-08 ENCOUNTER — OFFICE VISIT (OUTPATIENT)
Dept: FAMILY MEDICINE | Facility: OTHER | Age: 58
End: 2023-08-08
Attending: PHYSICIAN ASSISTANT
Payer: COMMERCIAL

## 2023-08-08 VITALS
HEIGHT: 65 IN | DIASTOLIC BLOOD PRESSURE: 72 MMHG | WEIGHT: 209 LBS | HEART RATE: 64 BPM | SYSTOLIC BLOOD PRESSURE: 124 MMHG | BODY MASS INDEX: 34.82 KG/M2 | RESPIRATION RATE: 14 BRPM | OXYGEN SATURATION: 99 % | TEMPERATURE: 97 F

## 2023-08-08 DIAGNOSIS — U07.1 INFECTION DUE TO 2019 NOVEL CORONAVIRUS: ICD-10-CM

## 2023-08-08 DIAGNOSIS — M94.0 COSTOCHONDRITIS: Primary | ICD-10-CM

## 2023-08-08 PROCEDURE — 99214 OFFICE O/P EST MOD 30 MIN: CPT | Performed by: PHYSICIAN ASSISTANT

## 2023-08-08 RX ORDER — PREDNISONE 20 MG/1
20 TABLET ORAL 2 TIMES DAILY
Qty: 10 TABLET | Refills: 0 | Status: SHIPPED | OUTPATIENT
Start: 2023-08-08 | End: 2023-11-15

## 2023-08-08 ASSESSMENT — PAIN SCALES - GENERAL: PAINLEVEL: MODERATE PAIN (4)

## 2023-08-08 NOTE — NURSING NOTE
Patient presents to clinic with chest pain due to Costochondritis. She has had this multiple times  Bella Turner LPN ....................  8/8/2023   7:39 AM  EXT [id number]

## 2023-08-31 ENCOUNTER — TRANSFERRED RECORDS (OUTPATIENT)
Dept: HEALTH INFORMATION MANAGEMENT | Facility: CLINIC | Age: 58
End: 2023-08-31
Payer: COMMERCIAL

## 2023-10-03 ENCOUNTER — MYC MEDICAL ADVICE (OUTPATIENT)
Dept: FAMILY MEDICINE | Facility: OTHER | Age: 58
End: 2023-10-03
Payer: COMMERCIAL

## 2023-10-03 DIAGNOSIS — M94.0 COSTOCHONDRITIS: Primary | ICD-10-CM

## 2023-10-03 RX ORDER — PREDNISONE 20 MG/1
20 TABLET ORAL 2 TIMES DAILY
Qty: 10 TABLET | Refills: 0 | Status: SHIPPED | OUTPATIENT
Start: 2023-10-03 | End: 2024-02-28

## 2023-11-13 NOTE — PROGRESS NOTES
"  Assessment & Plan     (R07.81) Rib pain on right side  (primary encounter diagnosis)  Comment: check chest CT and labs. Probable costochondritis   Plan: CT Chest w Contrast, CRP, inflammation, ESR:         Erythrocyte sedimentation rate, Comprehensive         metabolic panel, CBC with platelets               BMI:   Estimated body mass index is 35.81 kg/m  as calculated from the following:    Height as of 8/8/23: 1.638 m (5' 4.5\").    Weight as of this encounter: 96.1 kg (211 lb 14.4 oz).   Weight management plan: Discussed healthy diet and exercise guidelines    See Patient Instructions    Return if symptoms worsen or fail to improve.    EL Reardon [facility]    Ana Metcalf is a 58 year old, presenting for the following health issues:  Back Pain        11/15/2023     1:33 PM   Additional Questions   Roomed by Ilda RUSHING     Chronic/Recurring Back Pain Follow Up    Where is your back pain located? (Select all that apply) middle of back right  How would you describe your back pain?  dull ache, sharp, and stabbing  Where does your back pain spread? Into the right ribs to the front  Since your last clinic visit for back pain, how has your pain changed? gradually worsening  Does your back pain interfere with your job? YES  Since your last visit, have you tried any new treatment? Yes -  chiropractor    Denies chest pain, nausea, dizziness, or SOB.     Denies abdominal pain.     Review of Systems   Constitutional, HEENT, cardiovascular, pulmonary, gi and gu systems are negative, except as otherwise noted.      Objective    /74 (BP Location: Right arm, Patient Position: Sitting, Cuff Size: Adult Large)   Pulse 59   Temp 97.7  F (36.5  C) (Tympanic)   Wt 96.1 kg (211 lb 14.4 oz)   SpO2 98%   BMI 35.81 kg/m    Body mass index is 35.81 kg/m .  Physical Exam   GENERAL: healthy, alert and no distress  NECK: no adenopathy, no asymmetry, masses, or scars and " thyroid normal to palpation  RESP: lungs clear to auscultation - no rales, rhonchi or wheezes  CV: regular rate and rhythm, normal S1 S2, no S3 or S4, no murmur, click or rub, no peripheral edema and peripheral pulses strong  ABDOMEN: soft, nontender, no hepatosplenomegaly, no masses and bowel sounds normal  MS: no gross musculoskeletal defects noted, no edema. TTP to right lateral/posterior rib cage at rib 9-10 region. No rash, erythema, bruising, or warmth to the touch to chest wall or posterior back. No step offs or TTP to spinal column. Distal pulses intact  SKIN: no suspicious lesions or rashes  PSYCH: mentation appears normal, affect normal/bright

## 2023-11-15 ENCOUNTER — OFFICE VISIT (OUTPATIENT)
Dept: FAMILY MEDICINE | Facility: OTHER | Age: 58
End: 2023-11-15
Attending: NURSE PRACTITIONER
Payer: COMMERCIAL

## 2023-11-15 VITALS
SYSTOLIC BLOOD PRESSURE: 118 MMHG | WEIGHT: 211.9 LBS | BODY MASS INDEX: 35.81 KG/M2 | DIASTOLIC BLOOD PRESSURE: 74 MMHG | OXYGEN SATURATION: 98 % | TEMPERATURE: 97.7 F | HEART RATE: 59 BPM

## 2023-11-15 DIAGNOSIS — R07.81 RIB PAIN ON RIGHT SIDE: Primary | ICD-10-CM

## 2023-11-15 LAB
ALBUMIN SERPL BCG-MCNC: 4.7 G/DL (ref 3.5–5.2)
ALP SERPL-CCNC: 86 U/L (ref 40–150)
ALT SERPL W P-5'-P-CCNC: 18 U/L (ref 0–50)
ANION GAP SERPL CALCULATED.3IONS-SCNC: 11 MMOL/L (ref 7–15)
AST SERPL W P-5'-P-CCNC: 20 U/L (ref 0–45)
BILIRUB SERPL-MCNC: <0.2 MG/DL
BUN SERPL-MCNC: 18.2 MG/DL (ref 6–20)
CALCIUM SERPL-MCNC: 9.9 MG/DL (ref 8.6–10)
CHLORIDE SERPL-SCNC: 101 MMOL/L (ref 98–107)
CREAT SERPL-MCNC: 0.89 MG/DL (ref 0.51–0.95)
CRP SERPL-MCNC: 7.14 MG/L
DEPRECATED HCO3 PLAS-SCNC: 29 MMOL/L (ref 22–29)
EGFRCR SERPLBLD CKD-EPI 2021: 75 ML/MIN/1.73M2
ERYTHROCYTE [DISTWIDTH] IN BLOOD BY AUTOMATED COUNT: 12.6 % (ref 10–15)
ERYTHROCYTE [SEDIMENTATION RATE] IN BLOOD BY WESTERGREN METHOD: 31 MM/HR (ref 0–30)
GLUCOSE SERPL-MCNC: 95 MG/DL (ref 70–99)
HCT VFR BLD AUTO: 38.4 % (ref 35–47)
HGB BLD-MCNC: 12.6 G/DL (ref 11.7–15.7)
MCH RBC QN AUTO: 29.5 PG (ref 26.5–33)
MCHC RBC AUTO-ENTMCNC: 32.8 G/DL (ref 31.5–36.5)
MCV RBC AUTO: 90 FL (ref 78–100)
PLATELET # BLD AUTO: 240 10E3/UL (ref 150–450)
POTASSIUM SERPL-SCNC: 4.4 MMOL/L (ref 3.4–5.3)
PROT SERPL-MCNC: 7.4 G/DL (ref 6.4–8.3)
RBC # BLD AUTO: 4.27 10E6/UL (ref 3.8–5.2)
SODIUM SERPL-SCNC: 141 MMOL/L (ref 135–145)
WBC # BLD AUTO: 7.8 10E3/UL (ref 4–11)

## 2023-11-15 PROCEDURE — 80053 COMPREHEN METABOLIC PANEL: CPT | Performed by: NURSE PRACTITIONER

## 2023-11-15 PROCEDURE — 36415 COLL VENOUS BLD VENIPUNCTURE: CPT | Performed by: NURSE PRACTITIONER

## 2023-11-15 PROCEDURE — 85652 RBC SED RATE AUTOMATED: CPT | Performed by: NURSE PRACTITIONER

## 2023-11-15 PROCEDURE — 85027 COMPLETE CBC AUTOMATED: CPT | Performed by: NURSE PRACTITIONER

## 2023-11-15 PROCEDURE — 86140 C-REACTIVE PROTEIN: CPT | Performed by: NURSE PRACTITIONER

## 2023-11-15 PROCEDURE — 99214 OFFICE O/P EST MOD 30 MIN: CPT | Performed by: NURSE PRACTITIONER

## 2023-11-15 ASSESSMENT — PAIN SCALES - GENERAL: PAINLEVEL: MILD PAIN (3)

## 2023-11-21 ENCOUNTER — HOSPITAL ENCOUNTER (OUTPATIENT)
Dept: CT IMAGING | Facility: OTHER | Age: 58
Discharge: HOME | End: 2023-11-21
Attending: NURSE PRACTITIONER | Admitting: NURSE PRACTITIONER
Payer: COMMERCIAL

## 2023-11-21 DIAGNOSIS — R07.81 RIB PAIN ON RIGHT SIDE: ICD-10-CM

## 2023-11-21 PROCEDURE — 71260 CT THORAX DX C+: CPT

## 2023-11-21 PROCEDURE — 250N000011 HC RX IP 250 OP 636: Performed by: NURSE PRACTITIONER

## 2023-11-21 RX ORDER — IOPAMIDOL 755 MG/ML
100 INJECTION, SOLUTION INTRAVASCULAR ONCE
Status: COMPLETED | OUTPATIENT
Start: 2023-11-21 | End: 2023-11-21

## 2023-11-21 RX ADMIN — IOPAMIDOL 90 ML: 755 INJECTION, SOLUTION INTRAVENOUS at 09:10

## 2023-12-12 DIAGNOSIS — G43.809 OTHER MIGRAINE WITHOUT STATUS MIGRAINOSUS, NOT INTRACTABLE: ICD-10-CM

## 2023-12-12 RX ORDER — BUTALBITAL, ACETAMINOPHEN AND CAFFEINE 50; 325; 40 MG/1; MG/1; MG/1
TABLET ORAL
Qty: 36 TABLET | Refills: 0 | Status: SHIPPED | OUTPATIENT
Start: 2023-12-12 | End: 2024-03-06

## 2024-01-18 ENCOUNTER — E-VISIT (OUTPATIENT)
Dept: URGENT CARE | Facility: CLINIC | Age: 59
End: 2024-01-18
Payer: COMMERCIAL

## 2024-01-18 DIAGNOSIS — J20.9 ACUTE BRONCHITIS WITH SYMPTOMS > 10 DAYS: ICD-10-CM

## 2024-01-18 DIAGNOSIS — J06.9 VIRAL URI WITH COUGH: Primary | ICD-10-CM

## 2024-01-18 PROCEDURE — 99421 OL DIG E/M SVC 5-10 MIN: CPT | Performed by: PHYSICIAN ASSISTANT

## 2024-01-18 RX ORDER — ALBUTEROL SULFATE 90 UG/1
2 AEROSOL, METERED RESPIRATORY (INHALATION) EVERY 4 HOURS PRN
Qty: 18 G | Refills: 0 | Status: SHIPPED | OUTPATIENT
Start: 2024-01-18

## 2024-01-18 RX ORDER — BENZONATATE 100 MG/1
CAPSULE ORAL
Qty: 30 CAPSULE | Refills: 0 | Status: SHIPPED | OUTPATIENT
Start: 2024-01-18

## 2024-01-18 NOTE — PATIENT INSTRUCTIONS
"Dear Yvonne Llanos,    After reviewing your responses, I've been able to diagnose you with \"Bronchitis\" which is a common infection of your lungs that is nearly always caused by a virus. Antibiotics will treat bacterial infections, but have no effect on viral infections.    The virus causes swelling and irritation of the air passages of your lungs which leads to cough. The illness spreads from your nose and throat to your windpipe and airways. It is often called a \"chest cold\" and can last up to 2 weeks, but is not a serious illness. Exposure to cigarette smoke usually makes this significantly worse.     To treat bronchitis, the main thing to do is drink lots of fluids and rest. Honey may soothe a sore throat and help manage your cough. You can take this straight or add to tea (or just warm water) with lemon juice. Sit in the bathroom with a hot shower running and breathe in the steam to loosen any congestion in your chest. Avoid smoke (cigarettes, bonfires, fireplace, wood burning stoves).    Medications to help your symptoms are also available over the counter. Take Tylenol or an NSAID such as ibuprofen or naproxen as needed for pain. Do not take ibuprofen if you have kidney disease, stomach ulcers or allergy to aspirin.  Delsym (dextromethorphan polistirex) is an over the counter cough medication that lasts 12 hours. Mucinex or Robitussin (guiafenesin) thin mucus and may help it to loosen more quickly. Cough drops can calm your cough as well.    I have sent in a prescription for tessalon perles which are pills to help control your cough. I also sent in an albuterol inhaler to help open up your lungs and reduce wheezing.    Bronchitis is most often highly contagious as viruses are spread through the air or touch. Avoid contact with others who may become infected, particularly children, the elderly and those whose immune systems might be weak.     If your symptoms worsen, you develop chest pain or shortness of " breath, fevers over 101, or are not improving at all in 7 days, please be seen in person with your primary care provider or at one of our urgent care locations which can be found on our website here.    Thanks again for choosing us as your health care partner,    Kaylin Frank PA-C

## 2024-01-18 NOTE — NURSING NOTE
"Chief Complaint   Patient presents with     Musculoskeletal Problem       Initial /70  Pulse 67  Temp 98.4  F (36.9  C) (Tympanic)  Ht 5' 5\" (1.651 m)  Wt 203 lb (92.1 kg)  SpO2 98%  BMI 33.78 kg/m2 Estimated body mass index is 33.78 kg/(m^2) as calculated from the following:    Height as of this encounter: 5' 5\" (1.651 m).    Weight as of this encounter: 203 lb (92.1 kg).  Medication Reconciliation: complete    Vira Padgett LPN  " Eliazar Jackson is a 13 day old male who was brought in for this visit.  History was provided by the caregiver  HPI:     Chief Complaint   Patient presents with    Well Baby     2 week check.    Weight Check     Breastfeeding supplementing filipe goodstart     Feedings: feeding well q2-3hrs mostly nursing.     Birth History    Birth     Length: 19.29\"     Weight: 2.835 kg (6 lb 4 oz)     HC 35 cm    Apgar     One: 9     Five: 9    Discharge Weight: 2.637 kg (5 lb 13 oz)    Delivery Method: , Classical    Gestation Age: 39 wks    Feeding: Breast Fed    Days in Hospital: 2.0    Hospital Name: [facility]     Mom 36y.o   Time of delivery - 9:00 AM  Mom - O+; baby O+  Neg for GBS  Vitamin K received  Passed hearing screen test  CCHD: passed  Hep B given 2024  No jaundice concerns         Review of Systems:   Voids: frequent, normal for age  Elimination: regular soft stools    PHYSICAL EXAM:   Ht 19.8\"   Wt 2.934 kg (6 lb 7.5 oz)   HC 34.8 cm   BMI 11.60 kg/m²   2.835 kg (6 lb 4 oz)  4%    Constitutional: Alert and normally responsive for age; no distress noted  Head/Face: Head is normocephalic with anterior fontanelle soft and flat  Eyes: Red reflexes are present bilaterally with no opacities seen; no abnormal eye discharge is noted; conjunctiva are clear  Ears: Normal external ears; tympanic membranes are normal  Nose/Mouth/Throat: Nose and throat normal; palate is intact; mucous membranes are moist with no oral lesions are noted  Neck/Thyroid: No swelling or masses  Respiratory: Normal to inspection; normal respiratory effort; lungs are clear to auscultation  Cardiovascular: Regular rate and rhythm; no murmurs  Vascular: Normal femoral pulses; normal capillary refill  Abdomen: Non-distended; no organomegaly noted; no masses; navel area is dry and clean; cord is off  Genitourinary: Normal male with testes descended bilat  Skin/Hair: No unusual rashes present; no abnormal bruising noted; no  jaundice  Back/Spine: No abnormalities noted  Hips: No asymmetry of gluteal folds; equal leg length; full abduction of hips with negative Cruz and Ortalani manuevers  Musculoskeletal: No abnormalities noted  Extremities: No edema, cyanosis, or clubbing  Neurological: Appropriate for age reflexes; normal tone    ASSESSMENT/PLAN:   Eliazar was seen today for well baby and weight check.    Diagnoses and all orders for this visit:    Encounter for routine child health examination without abnormal findings      Anticipatory guidance for age  AVS with instructions given    All breast fed babies (even partial) - give them vitamin D daily: 400 IU once daily by mouth (Tri-Vi-Sol or D-Vi-Sol)    Call immediately if any signs of illness - poor feeding, fever (>100.4 rectal), doesn't look well, poor color or trouble breathing for examples      Parental concerns and questions addressed.  Reviewed feeding plan based on weight.    Parents aware that infant needs to sleep on his back  Safety issues reviewed  Tummy time discussed  Discussed recommendations of Tdap and Flu vaccine for parents and caregivers    Call us with any questions/concerns    See back at 2 mo of age    Steven Davis DO  1/18/2024  .

## 2024-01-23 ENCOUNTER — TELEPHONE (OUTPATIENT)
Dept: FAMILY MEDICINE | Facility: OTHER | Age: 59
End: 2024-01-23

## 2024-01-23 NOTE — TELEPHONE ENCOUNTER
8:04 AM    Reason for Call: OVERBOOK    Patient is having the following symptoms: Has not been well since illness around Thanksgiving, stopped a medication due to allergic reaction and has weight loss.   .    The patient is requesting an appointment for today or ASAP  with Deep Correa.    Was an appointment offered for this call? No  If yes : Appointment type              Date    Preferred method for responding to this message: Telephone Call  What is your phone number ?  [phone]     If we cannot reach you directly, may we leave a detailed response at the number you provided? No    Can this message wait until your PCP/provider returns, if unavailable today? Not applicable    Aleida Mukherjee

## 2024-01-24 ENCOUNTER — ANCILLARY PROCEDURE (OUTPATIENT)
Dept: GENERAL RADIOLOGY | Facility: OTHER | Age: 59
End: 2024-01-24
Attending: FAMILY MEDICINE
Payer: COMMERCIAL

## 2024-01-24 ENCOUNTER — OFFICE VISIT (OUTPATIENT)
Dept: FAMILY MEDICINE | Facility: OTHER | Age: 59
End: 2024-01-24
Attending: FAMILY MEDICINE
Payer: COMMERCIAL

## 2024-01-24 VITALS
BODY MASS INDEX: 33.56 KG/M2 | TEMPERATURE: 98 F | OXYGEN SATURATION: 95 % | WEIGHT: 198.6 LBS | HEART RATE: 101 BPM | DIASTOLIC BLOOD PRESSURE: 83 MMHG | SYSTOLIC BLOOD PRESSURE: 118 MMHG

## 2024-01-24 DIAGNOSIS — R06.02 SOB (SHORTNESS OF BREATH): ICD-10-CM

## 2024-01-24 DIAGNOSIS — R06.02 SOB (SHORTNESS OF BREATH): Primary | ICD-10-CM

## 2024-01-24 DIAGNOSIS — J06.9 UPPER RESPIRATORY TRACT INFECTION, UNSPECIFIED TYPE: ICD-10-CM

## 2024-01-24 LAB
BASOPHILS # BLD AUTO: 0 10E3/UL (ref 0–0.2)
BASOPHILS NFR BLD AUTO: 0 %
EOSINOPHIL # BLD AUTO: 0 10E3/UL (ref 0–0.7)
EOSINOPHIL NFR BLD AUTO: 0 %
ERYTHROCYTE [DISTWIDTH] IN BLOOD BY AUTOMATED COUNT: 12.4 % (ref 10–15)
HCT VFR BLD AUTO: 40.2 % (ref 35–47)
HGB BLD-MCNC: 13.5 G/DL (ref 11.7–15.7)
IMM GRANULOCYTES # BLD: 0 10E3/UL
IMM GRANULOCYTES NFR BLD: 0 %
LYMPHOCYTES # BLD AUTO: 1.8 10E3/UL (ref 0.8–5.3)
LYMPHOCYTES NFR BLD AUTO: 19 %
MCH RBC QN AUTO: 28.7 PG (ref 26.5–33)
MCHC RBC AUTO-ENTMCNC: 33.6 G/DL (ref 31.5–36.5)
MCV RBC AUTO: 86 FL (ref 78–100)
MONOCYTES # BLD AUTO: 0.8 10E3/UL (ref 0–1.3)
MONOCYTES NFR BLD AUTO: 9 %
NEUTROPHILS # BLD AUTO: 7.1 10E3/UL (ref 1.6–8.3)
NEUTROPHILS NFR BLD AUTO: 72 %
PLATELET # BLD AUTO: 318 10E3/UL (ref 150–450)
RBC # BLD AUTO: 4.7 10E6/UL (ref 3.8–5.2)
WBC # BLD AUTO: 9.8 10E3/UL (ref 4–11)

## 2024-01-24 PROCEDURE — 36415 COLL VENOUS BLD VENIPUNCTURE: CPT | Performed by: FAMILY MEDICINE

## 2024-01-24 PROCEDURE — 99214 OFFICE O/P EST MOD 30 MIN: CPT | Performed by: FAMILY MEDICINE

## 2024-01-24 PROCEDURE — 85025 COMPLETE CBC W/AUTO DIFF WBC: CPT | Performed by: FAMILY MEDICINE

## 2024-01-24 PROCEDURE — 71046 X-RAY EXAM CHEST 2 VIEWS: CPT | Mod: TC | Performed by: RADIOLOGY

## 2024-01-24 RX ORDER — PREDNISONE 20 MG/1
20 TABLET ORAL 2 TIMES DAILY
Qty: 10 TABLET | Refills: 0 | Status: SHIPPED | OUTPATIENT
Start: 2024-01-24 | End: 2024-01-29

## 2024-01-24 NOTE — PROGRESS NOTES
Assessment & Plan     SOB (shortness of breath)  Ongoing.  Doubt allergy overall given the presentation.  Xray clear.  Cbc stable.  Suspect a post inflammatory bronchitis overall.  Steroids and follow.   - XR CHEST 2 VW (Clinic Performed); Future  - CBC with platelets and differential; Future    Upper respiratory tract infection, unspecified type  As above.  Nice review.  No pneumonia or other concerns noted.  Prednisone and follow.  Consider a sinusitis and we can do abx over the phone if this steroid isn't sufficient.    - XR CHEST 2 VW (Clinic Performed); Future  - CBC with platelets and differential; Future                No follow-ups on file.    Subjective   Tea is a 58 year old, presenting for the following health issues:  Cough        1/24/2024     9:26 AM   Additional Questions   Roomed by Kortney Vera   Accompanied by none         1/24/2024     9:26 AM   Patient Reported Additional Medications   Patient reports taking the following new medications none     HPI     pt believes she is allergic to Coreg she has since stopped taking it, she did an evisit on 1/18 and they advised that she may have bronchitis    Acute Illness  Acute illness concerns: on going cough  Onset/Duration: around thanksgiving  Symptoms:  Fever: No  Chills/Sweats: No  Headache (location?): No  Sinus Pressure: No  Conjunctivitis:  No  Ear Pain: no  Rhinorrhea: No  Congestion: YES  Sore Throat: No  Cough: YES-productive of clear sputum, with shortness of breath  Wheeze: YES  Decreased Appetite: YES  Nausea: No  Vomiting: No  Diarrhea: YES  Dysuria/Freq.: No  Dysuria or Hematuria: No  Fatigue/Achiness: YES  Sick/Strep Exposure: pt did have the flu but believes that some of this is due to being allergic to a medication she was taking  Therapies tried and outcome: albuterol and benzonatate        Review of Systems  Constitutional, HEENT, cardiovascular, pulmonary, gi and gu systems are negative, except as otherwise noted.      Objective     /83 (BP Location: Right arm, Patient Position: Sitting, Cuff Size: Adult Large)   Pulse 101   Temp 98  F (36.7  C) (Tympanic)   Wt 90.1 kg (198 lb 9.6 oz)   SpO2 95%   BMI 33.56 kg/m    Body mass index is 33.56 kg/m .  Physical Exam   GENERAL: alert and no distress  EYES: Eyes grossly normal to inspection, PERRL and conjunctivae and sclerae normal  HENT: ear canals and TM's normal, nose and mouth without ulcers or lesions  NECK: no adenopathy, no asymmetry, masses, or scars  RESP: lungs clear to auscultation with mild bilateral exp wheezes CV: regular rate and rhythm, normal S1 S2, no S3 or S4, no murmur, click or rub, no peripheral edema  ABDOMEN: soft, nontender, no hepatosplenomegaly, no masses and bowel sounds normal  MS: no gross musculoskeletal defects noted, no edema    CXR - Reviewed and interpreted by me Normal- no infiltrates, effusions, pneumothoraces, cardiomegaly or masses    Cbc normal.          Signed Electronically by: Cholo Adame MD

## 2024-02-02 DIAGNOSIS — J01.90 ACUTE SINUSITIS, RECURRENCE NOT SPECIFIED, UNSPECIFIED LOCATION: Primary | ICD-10-CM

## 2024-02-02 RX ORDER — AZITHROMYCIN 250 MG/1
TABLET, FILM COATED ORAL
Qty: 6 TABLET | Refills: 0 | Status: SHIPPED | OUTPATIENT
Start: 2024-02-02 | End: 2024-02-28

## 2024-02-27 SDOH — HEALTH STABILITY: PHYSICAL HEALTH
ON AVERAGE, HOW MANY DAYS PER WEEK DO YOU ENGAGE IN MODERATE TO STRENUOUS EXERCISE (LIKE A BRISK WALK)?: PATIENT DECLINED

## 2024-02-27 SDOH — HEALTH STABILITY: PHYSICAL HEALTH: ON AVERAGE, HOW MANY MINUTES DO YOU ENGAGE IN EXERCISE AT THIS LEVEL?: PATIENT DECLINED

## 2024-02-27 ASSESSMENT — SOCIAL DETERMINANTS OF HEALTH (SDOH): HOW OFTEN DO YOU GET TOGETHER WITH FRIENDS OR RELATIVES?: ONCE A WEEK

## 2024-02-28 ENCOUNTER — OFFICE VISIT (OUTPATIENT)
Dept: FAMILY MEDICINE | Facility: OTHER | Age: 59
End: 2024-02-28
Attending: FAMILY MEDICINE
Payer: COMMERCIAL

## 2024-02-28 VITALS
BODY MASS INDEX: 33.32 KG/M2 | WEIGHT: 200 LBS | HEART RATE: 73 BPM | OXYGEN SATURATION: 96 % | SYSTOLIC BLOOD PRESSURE: 104 MMHG | TEMPERATURE: 97 F | HEIGHT: 65 IN | DIASTOLIC BLOOD PRESSURE: 62 MMHG

## 2024-02-28 DIAGNOSIS — E03.8 SUBCLINICAL HYPOTHYROIDISM: ICD-10-CM

## 2024-02-28 DIAGNOSIS — E78.5 HYPERLIPIDEMIA LDL GOAL <130: ICD-10-CM

## 2024-02-28 DIAGNOSIS — I10 ESSENTIAL HYPERTENSION, BENIGN: Chronic | ICD-10-CM

## 2024-02-28 DIAGNOSIS — Z00.00 ROUTINE GENERAL MEDICAL EXAMINATION AT A HEALTH CARE FACILITY: Primary | ICD-10-CM

## 2024-02-28 DIAGNOSIS — I51.89 DIASTOLIC DYSFUNCTION: Chronic | ICD-10-CM

## 2024-02-28 LAB
ALBUMIN SERPL BCG-MCNC: 4.5 G/DL (ref 3.5–5.2)
ALP SERPL-CCNC: 89 U/L (ref 40–150)
ALT SERPL W P-5'-P-CCNC: 12 U/L (ref 0–50)
ANION GAP SERPL CALCULATED.3IONS-SCNC: 13 MMOL/L (ref 7–15)
AST SERPL W P-5'-P-CCNC: 21 U/L (ref 0–45)
BILIRUB SERPL-MCNC: 0.3 MG/DL
BUN SERPL-MCNC: 15.8 MG/DL (ref 6–20)
CALCIUM SERPL-MCNC: 10.1 MG/DL (ref 8.6–10)
CHLORIDE SERPL-SCNC: 97 MMOL/L (ref 98–107)
CHOLEST SERPL-MCNC: 277 MG/DL
CREAT SERPL-MCNC: 0.98 MG/DL (ref 0.51–0.95)
DEPRECATED HCO3 PLAS-SCNC: 29 MMOL/L (ref 22–29)
EGFRCR SERPLBLD CKD-EPI 2021: 67 ML/MIN/1.73M2
FASTING STATUS PATIENT QL REPORTED: YES
GLUCOSE SERPL-MCNC: 93 MG/DL (ref 70–99)
HDLC SERPL-MCNC: 58 MG/DL
LDLC SERPL CALC-MCNC: 171 MG/DL
NONHDLC SERPL-MCNC: 219 MG/DL
POTASSIUM SERPL-SCNC: 3.9 MMOL/L (ref 3.4–5.3)
PROT SERPL-MCNC: 7.9 G/DL (ref 6.4–8.3)
SODIUM SERPL-SCNC: 139 MMOL/L (ref 135–145)
TRIGL SERPL-MCNC: 242 MG/DL
TSH SERPL DL<=0.005 MIU/L-ACNC: 2.69 UIU/ML (ref 0.3–4.2)

## 2024-02-28 PROCEDURE — 80061 LIPID PANEL: CPT | Performed by: FAMILY MEDICINE

## 2024-02-28 PROCEDURE — 84443 ASSAY THYROID STIM HORMONE: CPT | Performed by: FAMILY MEDICINE

## 2024-02-28 PROCEDURE — 80053 COMPREHEN METABOLIC PANEL: CPT | Performed by: FAMILY MEDICINE

## 2024-02-28 PROCEDURE — 36415 COLL VENOUS BLD VENIPUNCTURE: CPT | Performed by: FAMILY MEDICINE

## 2024-02-28 PROCEDURE — 99396 PREV VISIT EST AGE 40-64: CPT | Performed by: FAMILY MEDICINE

## 2024-02-28 ASSESSMENT — PAIN SCALES - GENERAL: PAINLEVEL: MODERATE PAIN (5)

## 2024-02-28 NOTE — PROGRESS NOTES
Preventive Care Visit  [facility]  Cholo Adame MD, Family Medicine  Feb 28, 2024    Assessment & Plan     Hyperlipidemia LDL goal <130  Update.   - Comprehensive metabolic panel; Future  - Lipid Profile; Future    Subclinical hypothyroidism  Update.   - TSH with free T4 reflex; Future    Routine general medical examination at a health care facility  Doing well overall with MS flares and significant difficulties with needs for breaks and some cognitive decline and sob and need for naps.      Diastolic dysfunction grade 1 on 5/10/2019  Stable and euvolemic.      Essential hypertension, benign  Stable.  Home readings higher and she will compare.                Counseling  Appropriate preventive services were discussed with this patient, including applicable screening as appropriate for fall prevention, nutrition, physical activity, Tobacco-use cessation, weight loss and cognition.  Checklist reviewing preventive services available has been given to the patient.  Reviewed patient's diet, addressing concerns and/or questions.           No follow-ups on file.    Ana Metcalf is a 58 year old, presenting for the following:  Physical         Health Care Directive  Patient does not have a Health Care Directive or Living Will: Discussed advance care planning with patient; information given to patient to review.    HPI            2/27/2024   General Health   How would you rate your overall physical health? (!) DECLINE   Feel stress (tense, anxious, or unable to sleep) Patient declined         2/27/2024   Nutrition   Three or more servings of calcium each day? Yes   Diet: Low salt   How many servings of fruit and vegetables per day? (!) 2-3   How many sweetened beverages each day? 0-1         2/27/2024   Exercise   Days per week of moderate/strenous exercise Patient declined   Average minutes spent exercising at this level Patient declined         2/27/2024   Social Factors   Frequency of gathering with friends  or relatives Once a week   Worry food won't last until get money to buy more Patient declined   Food not last or not have enough money for food? Patient declined   Do you have housing?  Patient declined   Are you worried about losing your housing? Patient declined   Lack of transportation? Patient declined   Unable to get utilities (heat,electricity)? Patient declined         2/28/2024   Fall Risk   Gait Speed Test (Document in seconds) 3          2/27/2024   Dental   Dentist two times every year? Yes         2/27/2024   TB Screening   Were you born outside of [place]?  No         Today's PHQ-2 Score:       2/27/2024     8:40 PM   PHQ-2 ( 1999 [employer])   Q1: Little interest or pleasure in doing things 0   Q2: Feeling down, depressed or hopeless 0   PHQ-2 Score 0   Q1: Little interest or pleasure in doing things Not at all   Q2: Feeling down, depressed or hopeless Not at all   PHQ-2 Score 0           2/27/2024   Substance Use   Alcohol more than 3/day or more than 7/wk Not Applicable   Do you use any other substances recreationally? No     Social History     Tobacco Use    Smoking status: Never    Smokeless tobacco: Never   Vaping Use    Vaping Use: Never used   Substance Use Topics    Alcohol use: Not Currently     Comment: rare    Drug use: No             2/27/2024   Breast Cancer Screening   Family history of breast, colon, or ovarian cancer? No / Unknown         10/26/2021   LAST FHS-7 RESULTS   1st degree relative breast or ovarian cancer No   Any relative bilateral breast cancer No   Any male have breast cancer No   Any ONE woman have BOTH breast AND ovarian cancer No   Any woman with breast cancer before 50yrs No   2 or more relatives with breast AND/OR ovarian cancer No   2 or more relatives with breast AND/OR bowel cancer No        Declined mammo for now.         2/27/2024   STI Screening   New sexual partner(s) since last STI/HIV test? No     History of abnormal Pap smear: Status post benign hysterectomy. Health  Maintenance and Surgical History updated.       ASCVD Risk   The 10-year ASCVD risk score (Tootie WELSH, et al., 2019) is: 2.9%    Values used to calculate the score:      Age: 58 years      Sex: Female      Is Non- : No      Diabetic: No      Tobacco smoker: No      Systolic Blood Pressure: 104 mmHg      Is BP treated: Yes      HDL Cholesterol: 56 mg/dL      Total Cholesterol: 249 mg/dL           Reviewed and updated as needed this visit by Provider                    Past Medical History:   Diagnosis Date    Arthritis     Endometriosis     Heart disease     Hypertensive urgency 2019    Other abnormal glucose 2011    Pancreatitis due to biliary obstruction (H28)     improved after cholecystectomy     Past Surgical History:   Procedure Laterality Date     SECTION       SECTION      CHOLECYSTECTOMY      ENDOSCOPIC RELEASE CARPAL TUNNEL Right 2022    Procedure: RELEASE, CARPAL TUNNEL, ENDOSCOPIC;  Surgeon: Bal Cespedes MD;  Location: [facility] OR    GENITOURINARY SURGERY      Hysterectomy    HYSTERECTOMY TOTAL ABDOMINAL, BILATERAL SALPINGO-OOPHORECTOMY, COMBINED  2001    Endometriosis    LAPAROSCOPIC GASTRIC SLEEVE  2014    [place]    LAPAROSCOPY      D&C, exploratory,  placental tissue adhered to uterus    ORTHOPEDIC SURGERY  2015    rotator cuff tendon surgery Left          Review of Systems  CONSTITUTIONAL: NEGATIVE for fever, chills, change in weight  INTEGUMENTARY/SKIN: NEGATIVE for worrisome rashes, moles or lesions  EYES: NEGATIVE for vision changes or irritation  ENT/MOUTH: NEGATIVE for ear, mouth and throat problems  RESP:sob with exertion  BREAST: NEGATIVE for masses, tenderness or discharge  CV: NEGATIVE for chest pain, palpitations or peripheral edema  CV: tachy with exertion since having covid.    GI: NEGATIVE for nausea, abdominal pain, heartburn, or change in bowel habits  : NEGATIVE for frequency, dysuria, or  "hematuria  MUSCULOSKELETAL: NEGATIVE for significant arthralgias or myalgia  NEURO: NEGATIVE for weakness, dizziness or paresthesias  ENDOCRINE: NEGATIVE for temperature intolerance, skin/hair changes  HEME: NEGATIVE for bleeding problems  PSYCHIATRIC: NEGATIVE for changes in mood or affect     Objective    Exam  /62   Pulse 73   Temp 97  F (36.1  C) (Tympanic)   Ht 1.651 m (5' 5\")   Wt 90.7 kg (200 lb)   SpO2 96%   BMI 33.28 kg/m     Estimated body mass index is 33.28 kg/m  as calculated from the following:    Height as of this encounter: 1.651 m (5' 5\").    Weight as of this encounter: 90.7 kg (200 lb).    Physical Exam  GENERAL: alert and no distress  EYES: Eyes grossly normal to inspection, PERRL and conjunctivae and sclerae normal  HENT: ear canals and TM's normal, nose and mouth without ulcers or lesions  NECK: no adenopathy, no asymmetry, masses, or scars  RESP: lungs clear to auscultation - no rales, rhonchi or wheezes  CV: regular rate and rhythm, normal S1 S2, no S3 or S4, no murmur, click or rub, no peripheral edema  ABDOMEN: soft, nontender, no hepatosplenomegaly, no masses and bowel sounds normal  MS: no gross musculoskeletal defects noted, no edema  SKIN: no suspicious lesions or rashes  NEURO: Normal strength and tone, mentation intact and speech normal  PSYCH: mentation appears normal, affect normal/bright      Signed Electronically by: Cholo Adame MD    "

## 2024-03-18 NOTE — PROGRESS NOTES
Preoperative Evaluation  [facility]  [address]  [facility] [address]  Phone: [phone]  Primary Provider: Cholo Naranjo  Pre-op Performing Provider: CHOLO NARANJO  Mar 19, 2024       Tea is a 58 year old, presenting for the following:  Pre-Op Exam      Surgical Information  Surgery/Procedure: bilateral eye muscle surgery   Surgery Location: [facility]  Surgeon: Dr Inez Meza  Surgery Date: 3/29/24  Time of Surgery: TBD  Where patient plans to recover: At home with family  Fax number for surgical facility:     Assessment & Plan     The proposed surgical procedure is considered LOW risk.    Preoperative examination  Doing well.  At baseline health wise.  Stable functional capacity which is limited slightly by her MS it seems.  EKG unchanged.  Cbc stable.  Bmp pending.  No indication for further workup and the procedure can proceed.    - Basic metabolic panel; Future  - CBC with Platelets & Differential; Future  - EKG 12-lead complete w/read - Clinics  - Basic metabolic panel  - CBC with Platelets & Differential    Double vision  Upcoming surgery.     MS (multiple sclerosis) (H)  Noted.     Essential hypertension, benign  Stable.     Costochondritis  History of recurrent.  Noted on her problem list as she requested it is noted.              - No identified additional risk factors other than previously addressed        Recommendation  APPROVAL GIVEN to proceed with proposed procedure, without further diagnostic evaluation.          Subjective       HPI related to upcoming procedure: upcoming surgery for double vision.         3/18/2024     2:56 PM   Preop Questions   1. Have you ever had a heart attack or stroke? No   2. Have you ever had surgery on your heart or blood vessels, such as a stent placement, a coronary artery bypass, or surgery on an artery in your head, neck, heart, or legs? No   3. Do you have chest pain with activity? No   4. Do you have a history of  heart  failure? YES -    5. Do you currently have a cold, bronchitis or symptoms of other infection? No   6. Do you have a cough, shortness of breath, or wheezing? No   7. Do you or anyone in your family have previous history of blood clots? No   8. Do you or does anyone in your family have a serious bleeding problem such as prolonged bleeding following surgeries or cuts? No   9. Have you ever had problems with anemia or been told to take iron pills? No   10. Have you had any abnormal blood loss such as black, tarry or bloody stools, or abnormal vaginal bleeding? No   11. Have you ever had a blood transfusion? No   12. Are you willing to have a blood transfusion if it is medically needed before, during, or after your surgery? Yes   13. Have you or any of your relatives ever had problems with anesthesia? No   14. Do you have sleep apnea, excessive snoring or daytime drowsiness? YES -    14a. Do you have a CPAP machine? No   15. Do you have any artifical heart valves or other implanted medical devices like a pacemaker, defibrillator, or continuous glucose monitor? No   16. Do you have artificial joints? No   17. Are you allergic to latex? No   18. Is there any chance that you may be pregnant? No       Health Care Directive  Patient does not have a Health Care Directive or Living Will: Discussed advance care planning with patient; information given to patient to review.    Preoperative Review of         Status of Chronic Conditions:  See problem list for active medical problems.  Problems all longstanding and stable, except as noted/documented.  See ROS for pertinent symptoms related to these conditions.    Patient Active Problem List    Diagnosis Date Noted    Morbid obesity (H) 02/24/2022     Priority: Medium    Migraines      Priority: Medium    Other fatigue 05/30/2019     Priority: Medium    Obesity 05/30/2019     Priority: Medium    Resistant hypertension 05/30/2019     Priority: Medium    Diastolic dysfunction grade  1 on 5/10/2019 2019     Priority: Medium    Subclinical hypothyroidism 2019     Priority: Medium    Hx of cardiac murmur 2019     Priority: Medium    Fibromyalgia 2018     Priority: Medium    MS (multiple sclerosis) (H) 2017     Priority: Medium    Essential hypertension, benign 2014     Priority: Medium    Status post bariatric surgery 2014     Priority: Medium     S/p gastric sleeve       Hyperlipidemia LDL goal <130 2013     Priority: Medium    Insulin resistance 2013     Priority: Medium    Tear film insufficiency 2013     Priority: Medium    Blepharitis 2012     Priority: Medium     Formatting of this note might be different from the original.  IMO Update      Seasonal allergies 2011     Priority: Medium      Past Medical History:   Diagnosis Date    Arthritis     Endometriosis     Heart disease     Hypertensive urgency 2019    Other abnormal glucose 2011    Pancreatitis due to biliary obstruction (H28)     improved after cholecystectomy     Past Surgical History:   Procedure Laterality Date     SECTION       SECTION      CHOLECYSTECTOMY      ENDOSCOPIC RELEASE CARPAL TUNNEL Right 2022    Procedure: RELEASE, CARPAL TUNNEL, ENDOSCOPIC;  Surgeon: Bal Cespedes MD;  Location: [facility] OR    GENITOURINARY SURGERY      Hysterectomy    HYSTERECTOMY TOTAL ABDOMINAL, BILATERAL SALPINGO-OOPHORECTOMY, COMBINED  2001    Endometriosis    LAPAROSCOPIC GASTRIC SLEEVE  2014    [place]    LAPAROSCOPY      D&C, exploratory,  placental tissue adhered to uterus    ORTHOPEDIC SURGERY      rotator cuff tendon surgery Left      Current Outpatient Medications   Medication Sig Dispense Refill    albuterol (PROAIR HFA/PROVENTIL HFA/VENTOLIN HFA) 108 (90 Base) MCG/ACT inhaler Inhale 2 puffs into the lungs every 4 hours as needed for shortness of breath or wheezing 18 g 0    baclofen (LIORESAL) 10  MG tablet Take 10 mg by mouth 4 times daily  3    benzonatate (TESSALON) 100 MG capsule Take 1-2 capsules by mouth up to 3 times daily as needed for cough. 30 capsule 0    butalbital-acetaminophen-caffeine (ESGIC) -40 MG tablet TAKE 1 TABLET BY MOUTH 3 TIMES WEEKLY AS NEEDED FOR HEADACHE 36 tablet 0    cholecalciferol (VITAMIN D3) 5000 UNITS CAPS capsule Take 1 capsule (5,000 Units) by mouth daily  0    diazepam (VALIUM) 5 MG tablet TK 1 T Q 8-12 H PRN FOR MSP  5    folic acid-vit B6-vit B12 (FOLGARD) 0.8-10-0.115 MG TABS Take 1 tablet by mouth daily      gabapentin (NEURONTIN) 400 MG capsule TAKE ONE CAPSULE BY MOUTH THREE TIMES DAILY (Patient taking differently: Take 400 mg by mouth 4 times daily) 360 capsule 3    hydrochlorothiazide (MICROZIDE) 12.5 MG capsule TAKE 1 CAPSULE BY MOUTH TWICE DAILY 180 capsule 3    losartan (COZAAR) 100 MG tablet Take 1 tablet (100 mg) by mouth daily 90 tablet 3    magnesium 250 MG tablet       montelukast (SINGULAIR) 10 MG tablet TAKE 1 TABLET BY MOUTH EVERY EVENING 90 tablet 3    spironolactone (ALDACTONE) 25 MG tablet TAKE 1 TABLET(25 MG) BY MOUTH DAILY 90 tablet 3       Allergies   Allergen Reactions    Amitriptyline Other (See Comments) and Nausea     Nightmares    Ace Inhibitors Cough    Codeine Headache and Nausea     Headache and nausea     Flagyl [Metronidazole] Nausea    Lactose Intolerance (Gi) Diarrhea    Morphine Nausea    Promethazine Nausea     nausea    Seasonal Unknown    Sulfa Antibiotics Unknown        Social History     Tobacco Use    Smoking status: Never    Smokeless tobacco: Never   Substance Use Topics    Alcohol use: Not Currently     Comment: rare     Family History   Problem Relation Age of Onset    Hypertension Mother     Heart Failure Mother         Congestive    Other - See Comments Mother         marcos mitchell??  hypermobility    Lipids Father         Hyperlipidemia    Cancer Father         Skin    Hypertension Father     Prostate Cancer Father      Hyperlipidemia Father     Suicide Maternal Grandmother     Diabetes Maternal Grandfather     Clotting Disorder Maternal Grandfather     Diabetes Paternal Grandmother     Kidney Disease Paternal Grandmother     Other - See Comments Paternal Grandfather         old age    Depression Brother     Diabetes Brother         type 2    Myocardial Infarction Brother 32        ETOHic, +tobacco    Peripheral Vascular Disease Brother     Hyperlipidemia Brother     Other - See Comments Sister         Post Partum DVT    Other Cancer Sister         retinal tear    Known Genetic Syndrome Daughter         marcos danlos    Genetic Disorder Daughter         Ehler's Danlos    Known Genetic Syndrome Daughter         marcos danlos    Genetic Disorder Daughter         Ehler's Danlos    Diabetes Brother     Coronary Artery Disease Brother     Hypertension Brother     Substance Abuse Brother     Genetic Disorder Daughter         Crouzon's Syndrome     History   Drug Use No         Review of Systems    Review of Systems  CONSTITUTIONAL: NEGATIVE for fever, chills, change in weight  INTEGUMENTARY/SKIN: NEGATIVE for worrisome rashes, moles or lesions  EYES: NEGATIVE for vision changes or irritation  ENT/MOUTH: NEGATIVE for ear, mouth and throat problems  RESP: NEGATIVE for significant cough or SOB  BREAST: NEGATIVE for masses, tenderness or discharge  CV: NEGATIVE for chest pain, palpitations or peripheral edema  GI: NEGATIVE for nausea, abdominal pain, heartburn, or change in bowel habits  : NEGATIVE for frequency, dysuria, or hematuria  MUSCULOSKELETAL: NEGATIVE for significant arthralgias or myalgia  NEURO: NEGATIVE for weakness, dizziness or paresthesias  ENDOCRINE: NEGATIVE for temperature intolerance, skin/hair changes  HEME: NEGATIVE for bleeding problems  PSYCHIATRIC: NEGATIVE for changes in mood or affect    Objective    /70   Pulse 71   Temp 97.7  F (36.5  C) (Tympanic)   Wt 89.8 kg (198 lb)   SpO2 98%   BMI 32.95  "kg/m     Estimated body mass index is 32.95 kg/m  as calculated from the following:    Height as of 2/28/24: 1.651 m (5' 5\").    Weight as of this encounter: 89.8 kg (198 lb).  Physical Exam  GENERAL: alert and no distress  EYES: Eyes grossly normal to inspection, PERRL and conjunctivae and sclerae normal  HENT: ear canals and TM's normal, nose and mouth without ulcers or lesions  NECK: no adenopathy, no asymmetry, masses, or scars  RESP: lungs clear to auscultation - no rales, rhonchi or wheezes  CV: regular rate and rhythm, normal S1 S2, no S3 or S4, no murmur, click or rub, no peripheral edema  ABDOMEN: soft, nontender, no hepatosplenomegaly, no masses and bowel sounds normal  MS: no gross musculoskeletal defects noted, no edema  SKIN: no suspicious lesions or rashes  NEURO: Normal strength and tone, mentation intact and speech normal  PSYCH: mentation appears normal, affect normal/bright    Recent Labs   Lab Test 02/28/24  0831 01/24/24  1017 11/15/23  1406 02/27/23  1015   HGB  --  13.5 12.6  --    PLT  --  318 240  --      --  141 141   POTASSIUM 3.9  --  4.4 3.9   CR 0.98*  --  0.89 0.90   A1C  --   --   --  5.5        Diagnostics  Cbc normal.  Bmp pending.     EKG: appears normal, NSR, normal axis, normal intervals, no acute ST/T changes c/w ischemia, no LVH by voltage criteria, unchanged from previous tracings    Revised Cardiac Risk Index (RCRI)  The patient has the following serious cardiovascular risks for perioperative complications:   - No serious cardiac risks = 0 points     RCRI Interpretation: 0 points: Class I (very low risk - 0.4% complication rate)         Signed Electronically by: Cholo Adame MD  Copy of this evaluation report is provided to requesting physician.         "

## 2024-03-19 ENCOUNTER — OFFICE VISIT (OUTPATIENT)
Dept: FAMILY MEDICINE | Facility: OTHER | Age: 59
End: 2024-03-19
Attending: FAMILY MEDICINE
Payer: COMMERCIAL

## 2024-03-19 VITALS
OXYGEN SATURATION: 98 % | WEIGHT: 198 LBS | TEMPERATURE: 97.7 F | DIASTOLIC BLOOD PRESSURE: 70 MMHG | SYSTOLIC BLOOD PRESSURE: 118 MMHG | BODY MASS INDEX: 32.95 KG/M2 | HEART RATE: 71 BPM

## 2024-03-19 DIAGNOSIS — H53.2 DOUBLE VISION: ICD-10-CM

## 2024-03-19 DIAGNOSIS — M94.0 COSTOCHONDRITIS: ICD-10-CM

## 2024-03-19 DIAGNOSIS — G35 MS (MULTIPLE SCLEROSIS) (H): Chronic | ICD-10-CM

## 2024-03-19 DIAGNOSIS — I10 ESSENTIAL HYPERTENSION, BENIGN: Chronic | ICD-10-CM

## 2024-03-19 DIAGNOSIS — Z01.818 PREOPERATIVE EXAMINATION: Primary | ICD-10-CM

## 2024-03-19 LAB
ANION GAP SERPL CALCULATED.3IONS-SCNC: 11 MMOL/L (ref 7–15)
ATRIAL RATE - MUSE: 83 BPM
BASOPHILS # BLD AUTO: 0 10E3/UL (ref 0–0.2)
BASOPHILS NFR BLD AUTO: 0 %
BUN SERPL-MCNC: 19.9 MG/DL (ref 6–20)
CALCIUM SERPL-MCNC: 9.7 MG/DL (ref 8.6–10)
CHLORIDE SERPL-SCNC: 99 MMOL/L (ref 98–107)
CREAT SERPL-MCNC: 0.98 MG/DL (ref 0.51–0.95)
DEPRECATED HCO3 PLAS-SCNC: 30 MMOL/L (ref 22–29)
DIASTOLIC BLOOD PRESSURE - MUSE: NORMAL MMHG
EGFRCR SERPLBLD CKD-EPI 2021: 67 ML/MIN/1.73M2
EOSINOPHIL # BLD AUTO: 0.1 10E3/UL (ref 0–0.7)
EOSINOPHIL NFR BLD AUTO: 1 %
ERYTHROCYTE [DISTWIDTH] IN BLOOD BY AUTOMATED COUNT: 13.2 % (ref 10–15)
GLUCOSE SERPL-MCNC: 126 MG/DL (ref 70–99)
HCT VFR BLD AUTO: 36.9 %
HGB BLD-MCNC: 12.2 G/DL
IMM GRANULOCYTES # BLD: 0 10E3/UL
IMM GRANULOCYTES NFR BLD: 0 %
INTERPRETATION ECG - MUSE: NORMAL
LYMPHOCYTES # BLD AUTO: 2.4 10E3/UL (ref 0.8–5.3)
LYMPHOCYTES NFR BLD AUTO: 30 %
MCH RBC QN AUTO: 29.4 PG (ref 26.5–33)
MCHC RBC AUTO-ENTMCNC: 33.1 G/DL (ref 31.5–36.5)
MCV RBC AUTO: 89 FL (ref 78–100)
MONOCYTES # BLD AUTO: 0.6 10E3/UL (ref 0–1.3)
MONOCYTES NFR BLD AUTO: 7 %
NEUTROPHILS # BLD AUTO: 4.9 10E3/UL (ref 1.6–8.3)
NEUTROPHILS NFR BLD AUTO: 61 %
P AXIS - MUSE: 36 DEGREES
PLATELET # BLD AUTO: 275 10E3/UL (ref 150–450)
POTASSIUM SERPL-SCNC: 3.5 MMOL/L (ref 3.4–5.3)
PR INTERVAL - MUSE: 156 MS
QRS DURATION - MUSE: 78 MS
QT - MUSE: 340 MS
QTC - MUSE: 399 MS
R AXIS - MUSE: 5 DEGREES
RBC # BLD AUTO: 4.15 10E6/UL
SODIUM SERPL-SCNC: 140 MMOL/L (ref 135–145)
SYSTOLIC BLOOD PRESSURE - MUSE: NORMAL MMHG
T AXIS - MUSE: 67 DEGREES
VENTRICULAR RATE- MUSE: 83 BPM
WBC # BLD AUTO: 8.1 10E3/UL (ref 4–11)

## 2024-03-19 PROCEDURE — 99213 OFFICE O/P EST LOW 20 MIN: CPT | Performed by: FAMILY MEDICINE

## 2024-03-19 PROCEDURE — 80048 BASIC METABOLIC PNL TOTAL CA: CPT | Performed by: FAMILY MEDICINE

## 2024-03-19 PROCEDURE — 36415 COLL VENOUS BLD VENIPUNCTURE: CPT | Performed by: FAMILY MEDICINE

## 2024-03-19 PROCEDURE — 85025 COMPLETE CBC W/AUTO DIFF WBC: CPT | Performed by: FAMILY MEDICINE

## 2024-03-19 PROCEDURE — 93000 ELECTROCARDIOGRAM COMPLETE: CPT | Mod: 77 | Performed by: INTERNAL MEDICINE

## 2024-03-19 ASSESSMENT — PAIN SCALES - GENERAL: PAINLEVEL: MODERATE PAIN (4)

## 2024-03-20 ENCOUNTER — TELEPHONE (OUTPATIENT)
Dept: FAMILY MEDICINE | Facility: OTHER | Age: 59
End: 2024-03-20

## 2024-05-02 DIAGNOSIS — I10 ESSENTIAL HYPERTENSION, BENIGN: ICD-10-CM

## 2024-05-02 RX ORDER — LOSARTAN POTASSIUM 100 MG/1
100 TABLET ORAL DAILY
Qty: 90 TABLET | Refills: 3 | Status: SHIPPED | OUTPATIENT
Start: 2024-05-02

## 2024-05-11 DIAGNOSIS — G43.809 OTHER MIGRAINE WITHOUT STATUS MIGRAINOSUS, NOT INTRACTABLE: ICD-10-CM

## 2024-05-13 NOTE — TELEPHONE ENCOUNTER
[facility]      Last Written Prescription Date:  3/6/24  Last Fill Quantity: 36,   # refills: 0  Last Office Visit: 3/19/24  Future Office visit:       Routing refill request to provider for review/approval because:

## 2024-05-15 RX ORDER — BUTALBITAL, ACETAMINOPHEN AND CAFFEINE 50; 325; 40 MG/1; MG/1; MG/1
TABLET ORAL
Qty: 36 TABLET | Refills: 0 | Status: SHIPPED | OUTPATIENT
Start: 2024-05-15 | End: 2024-08-05

## 2024-05-21 NOTE — PROGRESS NOTES
"  Assessment & Plan     MS (multiple sclerosis) (H)  Following with neurology.  Progressive disease with associated sx.  Obviously, a reason for disability.  She has worked and adapted all these years and finally is having too much trouble all around.  I filled out the form, wrote a letter, and will continue to support her.      Migraine without status migrainosus, not intractable, unspecified migraine type  Intermittent and ongoing.      Other fatigue  As above.      Costochondritis  As above.            BMI  Estimated body mass index is 34.11 kg/m  as calculated from the following:    Height as of 2/28/24: 1.651 m (5' 5\").    Weight as of this encounter: 93 kg (205 lb).             No follow-ups on file.    Ana Metcalf is a 58 year old, presenting for the following health issues:  Forms        5/22/2024     1:44 PM   Additional Questions   Roomed by Cathy   Accompanied by self     HPI     Forms     Duration: NA  Description (location/character/radiation): [employer] disability form   Intensity:  moderate  Accompanying signs and symptoms: MS  History (similar episodes/previous evaluation): neurology every 6 months   Precipitating or alleviating factors: None  Therapies tried and outcome: baclofen, valium, gabapentin              Review of Systems  Constitutional, HEENT, cardiovascular, pulmonary, gi and gu systems are negative, except as otherwise noted.      Objective    /60   Pulse 109   Temp 98  F (36.7  C) (Tympanic)   Wt 93 kg (205 lb)   SpO2 97%   BMI 34.11 kg/m    Body mass index is 34.11 kg/m .  Physical Exam   GENERAL: alert and no distress  NECK: no adenopathy, no asymmetry, masses, or scars  RESP: lungs clear to auscultation - no rales, rhonchi or wheezes  CV: regular rate and rhythm, normal S1 S2, no S3 or S4, no murmur, click or rub, no peripheral edema  ABDOMEN: soft, nontender, no hepatosplenomegaly, no masses and bowel sounds normal  NEURO: she has multiple gross arm and upper body " movements c/w her MS sx.      No additional workup today.      Time spent reviewing material, filling out, discussing, and letter is 50 minutes total.          Signed Electronically by: Cholo Adame MD

## 2024-05-22 ENCOUNTER — OFFICE VISIT (OUTPATIENT)
Dept: FAMILY MEDICINE | Facility: OTHER | Age: 59
End: 2024-05-22
Attending: FAMILY MEDICINE
Payer: COMMERCIAL

## 2024-05-22 VITALS
OXYGEN SATURATION: 97 % | SYSTOLIC BLOOD PRESSURE: 106 MMHG | TEMPERATURE: 98 F | DIASTOLIC BLOOD PRESSURE: 60 MMHG | BODY MASS INDEX: 34.11 KG/M2 | WEIGHT: 205 LBS | HEART RATE: 109 BPM

## 2024-05-22 DIAGNOSIS — G43.909 MIGRAINE WITHOUT STATUS MIGRAINOSUS, NOT INTRACTABLE, UNSPECIFIED MIGRAINE TYPE: Chronic | ICD-10-CM

## 2024-05-22 DIAGNOSIS — R53.83 OTHER FATIGUE: ICD-10-CM

## 2024-05-22 DIAGNOSIS — G35 MS (MULTIPLE SCLEROSIS) (H): Primary | Chronic | ICD-10-CM

## 2024-05-22 DIAGNOSIS — M94.0 COSTOCHONDRITIS: ICD-10-CM

## 2024-05-22 PROCEDURE — 99215 OFFICE O/P EST HI 40 MIN: CPT | Performed by: FAMILY MEDICINE

## 2024-05-22 ASSESSMENT — PAIN SCALES - GENERAL: PAINLEVEL: MODERATE PAIN (5)

## 2024-05-22 NOTE — LETTER
May 22, 2024      Yvonne Llanos  [address] [address]        To Whom It May Concern:    Yvonne Llanos was seen in our clinic. I have been her primary care provider for over 10 years.  She is reliable and honest person in my opinion.  She is having increasing difficulty with work due to her MS and associated symptoms.  I believe she has made all adaptations possible to continue working and now at this point I have advised disability and no ongoing work.  She is working through this process and I am in full support.        Sincerely,        Cholo Adame MD

## 2024-05-31 DIAGNOSIS — I16.0 HYPERTENSIVE URGENCY: ICD-10-CM

## 2024-05-31 DIAGNOSIS — I51.89 DIASTOLIC DYSFUNCTION: ICD-10-CM

## 2024-05-31 DIAGNOSIS — I10 ESSENTIAL HYPERTENSION, BENIGN: ICD-10-CM

## 2024-05-31 RX ORDER — SPIRONOLACTONE 25 MG/1
TABLET ORAL
Qty: 90 TABLET | Refills: 3 | Status: SHIPPED | OUTPATIENT
Start: 2024-05-31

## 2024-05-31 NOTE — TELEPHONE ENCOUNTER
Spironolactone (Aldactone) 25 MG tablet     Last Written Prescription Date:  03/10/2023  Last Fill Quantity: 90,   # refills: 3  Last Office Visit: 05/22/2024

## 2024-07-08 ENCOUNTER — MYC MEDICAL ADVICE (OUTPATIENT)
Dept: FAMILY MEDICINE | Facility: OTHER | Age: 59
End: 2024-07-08

## 2024-07-08 DIAGNOSIS — M94.0 COSTOCHONDRITIS: ICD-10-CM

## 2024-07-08 RX ORDER — PREDNISONE 20 MG/1
20 TABLET ORAL 2 TIMES DAILY
Qty: 10 TABLET | Refills: 0 | Status: SHIPPED | OUTPATIENT
Start: 2024-07-08

## 2024-07-09 ENCOUNTER — TRANSFERRED RECORDS (OUTPATIENT)
Dept: HEALTH INFORMATION MANAGEMENT | Facility: CLINIC | Age: 59
End: 2024-07-09
Payer: COMMERCIAL

## 2024-08-04 DIAGNOSIS — G43.809 OTHER MIGRAINE WITHOUT STATUS MIGRAINOSUS, NOT INTRACTABLE: ICD-10-CM

## 2024-08-05 RX ORDER — BUTALBITAL, ACETAMINOPHEN AND CAFFEINE 50; 325; 40 MG/1; MG/1; MG/1
TABLET ORAL
Qty: 36 TABLET | Refills: 1 | Status: SHIPPED | OUTPATIENT
Start: 2024-08-05

## 2025-02-12 DIAGNOSIS — G43.809 OTHER MIGRAINE WITHOUT STATUS MIGRAINOSUS, NOT INTRACTABLE: ICD-10-CM

## 2025-02-12 RX ORDER — BUTALBITAL, ACETAMINOPHEN AND CAFFEINE 50; 325; 40 MG/1; MG/1; MG/1
TABLET ORAL
Qty: 36 TABLET | Refills: 0 | Status: SHIPPED | OUTPATIENT
Start: 2025-02-12

## 2025-02-12 NOTE — TELEPHONE ENCOUNTER
BUT/ACETAMINOPHEN/CAFF -40 TB         Last Written Prescription Date:  8/5/24  Last Fill Quantity: 36,   # refills: 1  Last Office Visit: 5/22/24  Future Office visit:    Next 5 appointments (look out 90 days)      Mar 03, 2025 8:15 AM  (Arrive by 8:00 AM)  Adult Preventative Visit with Cholo Adame MD  [facility] [address] [address]  [phone]             Routing refill request to provider for review/approval because:  Drug not on the [facility], [facility] or  Health refill protocol or controlled substance

## 2025-02-17 DIAGNOSIS — I10 ESSENTIAL HYPERTENSION, BENIGN: Chronic | ICD-10-CM

## 2025-02-17 RX ORDER — HYDROCHLOROTHIAZIDE 12.5 MG/1
CAPSULE ORAL
Qty: 180 CAPSULE | Refills: 0 | Status: SHIPPED | OUTPATIENT
Start: 2025-02-17

## 2025-03-02 SDOH — HEALTH STABILITY: PHYSICAL HEALTH: ON AVERAGE, HOW MANY MINUTES DO YOU ENGAGE IN EXERCISE AT THIS LEVEL?: 0 MIN

## 2025-03-02 SDOH — HEALTH STABILITY: PHYSICAL HEALTH: ON AVERAGE, HOW MANY DAYS PER WEEK DO YOU ENGAGE IN MODERATE TO STRENUOUS EXERCISE (LIKE A BRISK WALK)?: 0 DAYS

## 2025-03-02 ASSESSMENT — SOCIAL DETERMINANTS OF HEALTH (SDOH): HOW OFTEN DO YOU GET TOGETHER WITH FRIENDS OR RELATIVES?: THREE TIMES A WEEK

## 2025-03-03 ENCOUNTER — OFFICE VISIT (OUTPATIENT)
Dept: FAMILY MEDICINE | Facility: OTHER | Age: 60
End: 2025-03-03
Attending: FAMILY MEDICINE
Payer: COMMERCIAL

## 2025-03-03 ENCOUNTER — ANCILLARY PROCEDURE (OUTPATIENT)
Dept: GENERAL RADIOLOGY | Facility: OTHER | Age: 60
End: 2025-03-03
Attending: FAMILY MEDICINE
Payer: COMMERCIAL

## 2025-03-03 VITALS
SYSTOLIC BLOOD PRESSURE: 128 MMHG | HEART RATE: 60 BPM | OXYGEN SATURATION: 97 % | DIASTOLIC BLOOD PRESSURE: 80 MMHG | HEIGHT: 66 IN | TEMPERATURE: 97.9 F | BODY MASS INDEX: 33.14 KG/M2 | WEIGHT: 206.2 LBS

## 2025-03-03 DIAGNOSIS — M25.562 CHRONIC PAIN OF LEFT KNEE: ICD-10-CM

## 2025-03-03 DIAGNOSIS — G89.29 CHRONIC PAIN OF LEFT KNEE: ICD-10-CM

## 2025-03-03 DIAGNOSIS — Z00.00 ROUTINE GENERAL MEDICAL EXAMINATION AT A HEALTH CARE FACILITY: ICD-10-CM

## 2025-03-03 DIAGNOSIS — E03.8 SUBCLINICAL HYPOTHYROIDISM: ICD-10-CM

## 2025-03-03 DIAGNOSIS — E78.5 HYPERLIPIDEMIA LDL GOAL <130: Primary | ICD-10-CM

## 2025-03-03 DIAGNOSIS — Z12.31 ENCOUNTER FOR SCREENING MAMMOGRAM FOR MALIGNANT NEOPLASM OF BREAST: ICD-10-CM

## 2025-03-03 DIAGNOSIS — I51.89 DIASTOLIC DYSFUNCTION: ICD-10-CM

## 2025-03-03 DIAGNOSIS — G35 MS (MULTIPLE SCLEROSIS) (H): Chronic | ICD-10-CM

## 2025-03-03 LAB
ALBUMIN SERPL BCG-MCNC: 4.5 G/DL (ref 3.5–5.2)
ALP SERPL-CCNC: 101 U/L (ref 40–150)
ALT SERPL W P-5'-P-CCNC: 24 U/L (ref 0–50)
ANION GAP SERPL CALCULATED.3IONS-SCNC: 13 MMOL/L (ref 7–15)
AST SERPL W P-5'-P-CCNC: 25 U/L (ref 0–45)
BILIRUB SERPL-MCNC: 0.3 MG/DL
BUN SERPL-MCNC: 16.7 MG/DL (ref 8–23)
CALCIUM SERPL-MCNC: 9.7 MG/DL (ref 8.8–10.4)
CHLORIDE SERPL-SCNC: 96 MMOL/L (ref 98–107)
CHOLEST SERPL-MCNC: 263 MG/DL
CREAT SERPL-MCNC: 0.95 MG/DL (ref 0.51–0.95)
EGFRCR SERPLBLD CKD-EPI 2021: 69 ML/MIN/1.73M2
FASTING STATUS PATIENT QL REPORTED: YES
FASTING STATUS PATIENT QL REPORTED: YES
GLUCOSE SERPL-MCNC: 90 MG/DL (ref 70–99)
HCO3 SERPL-SCNC: 29 MMOL/L (ref 22–29)
HDLC SERPL-MCNC: 61 MG/DL
LDLC SERPL CALC-MCNC: 154 MG/DL
NONHDLC SERPL-MCNC: 202 MG/DL
POTASSIUM SERPL-SCNC: 3.7 MMOL/L (ref 3.4–5.3)
PROT SERPL-MCNC: 7.6 G/DL (ref 6.4–8.3)
SODIUM SERPL-SCNC: 138 MMOL/L (ref 135–145)
TRIGL SERPL-MCNC: 239 MG/DL
TSH SERPL DL<=0.005 MIU/L-ACNC: 4.43 UIU/ML (ref 0.3–4.2)

## 2025-03-03 PROCEDURE — 73562 X-RAY EXAM OF KNEE 3: CPT | Mod: TC | Performed by: RADIOLOGY

## 2025-03-03 RX ORDER — PREDNISONE 20 MG/1
20 TABLET ORAL 2 TIMES DAILY
Qty: 10 TABLET | Refills: 0 | Status: SHIPPED | OUTPATIENT
Start: 2025-03-03 | End: 2025-03-08

## 2025-03-03 ASSESSMENT — PAIN SCALES - GENERAL: PAINLEVEL_OUTOF10: MODERATE PAIN (4)

## 2025-03-03 NOTE — PROGRESS NOTES
"Preventive Care Visit  [facility]  Cholo Adame MD, Family Medicine  Mar 3, 2025      Assessment & Plan     Hyperlipidemia LDL goal <130  Update and follow.    - Comprehensive metabolic panel; Future  - Lipid Profile; Future  - Comprehensive metabolic panel  - Lipid Profile    Subclinical hypothyroidism  Update tsh with reflex.  .  - TSH; Future  - TSH    Encounter for screening mammogram for malignant neoplasm of breast  She politely declines but will have screen when she can.  She is going through lung cancer for her dad right now.      Routine general medical examination at a health care facility  Doing well.  As above.      Diastolic dysfunction  Ongoing.  Euvolemic.  Update echo every 3 years.    - Echocardiogram Complete; Future    MS (multiple sclerosis) (H)  Stable.  Steroid on hand for when she needs.    - predniSONE (DELTASONE) 20 MG tablet; Take 1 tablet (20 mg) by mouth 2 times daily for 5 days.    Chronic pain of left knee  From traumatic bursitis.  Pain with kneeling.  Likely calcification over the patella are palpable and tender.  Update xray to look and try and make sure I'm not missing something else as well.    - XR Knee Left 3 Views (Clinic Performed); Future            BMI  Estimated body mass index is 33.28 kg/m  as calculated from the following:    Height as of this encounter: 1.676 m (5' 6\").    Weight as of this encounter: 93.5 kg (206 lb 3.2 oz).       Counseling  Appropriate preventive services were addressed with this patient via screening, questionnaire, or discussion as appropriate for fall prevention, nutrition, physical activity, Tobacco-use cessation, social engagement, weight loss and cognition.  Checklist reviewing preventive services available has been given to the patient.  Reviewed patient's diet, addressing concerns and/or questions.           No follow-ups on file.    Ana Metcalf is a 59 year old, presenting for the following:  Physical        3/3/2025     8:02 " AM   Additional Questions   Roomed by Ilda LARA      [facility]       Advance Care Planning  Patient does not have a Health Care Directive: Discussed advance care planning with patient; however, patient declined at this time.        3/2/2025   General Health   How would you rate your overall physical health? (!) FAIR   Feel stress (tense, anxious, or unable to sleep) Not at all         3/2/2025   Nutrition   Three or more servings of calcium each day? Yes   Diet: Regular (no restrictions)   How many servings of fruit and vegetables per day? (!) 2-3   How many sweetened beverages each day? 0-1         3/2/2025   Exercise   Days per week of moderate/strenous exercise 0 days   Average minutes spent exercising at this level 0 min   (!) EXERCISE CONCERN      3/2/2025   Social Factors   Frequency of gathering with friends or relatives Three times a week   Worry food won't last until get money to buy more Patient declined   Food not last or not have enough money for food? Patient declined   Do you have housing? (Housing is defined as stable permanent housing and does not include staying ouside in a car, in a tent, in an abandoned building, in an overnight shelter, or couch-surfing.) Patient declined   Are you worried about losing your housing? Patient declined   Lack of transportation? Patient declined   Unable to get utilities (heat,electricity)? Patient declined         3/3/2025   Fall Risk   Gait Speed Test (Document in seconds) 4   Gait Speed Test Interpretation Less than or equal to 5.00 seconds - PASS          3/2/2025   Dental   Dentist two times every year? Yes         2/27/2024   TB Screening   Were you born outside of the [place]? No         Today's PHQ-2 Score:       3/2/2025    10:44 AM   PHQ-2 ( 1999 [employer])   Q1: Little interest or pleasure in doing things 0   Q2: Feeling down, depressed or hopeless 0   PHQ-2 Score 0    Q1: Little interest or pleasure in doing things Not at all   Q2: Feeling down, depressed or hopeless  Not at all   PHQ-2 Score 0       Patient-reported           3/2/2025   Substance Use   Alcohol more than 3/day or more than 7/wk Not Applicable   Do you use any other substances recreationally? No     Social History     Tobacco Use    Smoking status: Never    Smokeless tobacco: Never   Vaping Use    Vaping status: Never Used   Substance Use Topics    Alcohol use: Not Currently     Comment: rare    Drug use: No           10/26/2021   LAST FHS-7 RESULTS   1st degree relative breast or ovarian cancer No   Any relative bilateral breast cancer No   Any male have breast cancer No   Any ONE woman have BOTH breast AND ovarian cancer No   Any woman with breast cancer before 50yrs No   2 or more relatives with breast AND/OR ovarian cancer No   2 or more relatives with breast AND/OR bowel cancer No                3/2/2025   STI Screening   New sexual partner(s) since last STI/HIV test? No     History of abnormal Pap smear: No - age 30- 64 PAP with HPV every 5 years recommended       ASCVD Risk   The 10-year ASCVD risk score (Tootie WELSH, et al., 2019) is: 5%    Values used to calculate the score:      Age: 59 years      Sex: Female      Is Non- : No      Diabetic: No      Tobacco smoker: No      Systolic Blood Pressure: 128 mmHg      Is BP treated: Yes      HDL Cholesterol: 58 mg/dL      Total Cholesterol: 277 mg/dL           Reviewed and updated as needed this visit by Provider                    Past Medical History:   Diagnosis Date    Arthritis     Cancer (H)     Skin     Endometriosis     Heart disease     Hypertensive urgency 2019    Other abnormal glucose 2011    Pancreatitis due to biliary obstruction (H)     improved after cholecystectomy     Past Surgical History:   Procedure Laterality Date     SECTION       SECTION      CHOLECYSTECTOMY      ENDOSCOPIC RELEASE CARPAL TUNNEL Right 2022    Procedure: RELEASE, CARPAL TUNNEL, ENDOSCOPIC;   "Surgeon: Bal Cespedes MD;  Location: [facility] OR    GENITOURINARY SURGERY  2002    Hysterectomy    HYSTERECTOMY TOTAL ABDOMINAL, BILATERAL SALPINGO-OOPHORECTOMY, COMBINED  01/01/2001    Endometriosis    LAPAROSCOPIC GASTRIC SLEEVE  01/2014    [place]    LAPAROSCOPY  1997    D&C, exploratory,  placental tissue adhered to uterus    ORTHOPEDIC SURGERY  2015    rotator cuff tendon surgery Left          Review of Systems  CONSTITUTIONAL: NEGATIVE for fever, chills, change in weight  INTEGUMENTARY/SKIN: NEGATIVE for worrisome rashes, moles or lesions  EYES: NEGATIVE for vision changes or irritation  ENT/MOUTH: NEGATIVE for ear, mouth and throat problems  RESP: NEGATIVE for significant cough or SOB  BREAST: NEGATIVE for masses, tenderness or discharge  CV: NEGATIVE for chest pain, palpitations or peripheral edema  GI: NEGATIVE for nausea, abdominal pain, heartburn, or change in bowel habits  : NEGATIVE for frequency, dysuria, or hematuria  MUSCULOSKELETAL: NEGATIVE for significant arthralgias or myalgia  NEURO: NEGATIVE for weakness, dizziness or paresthesias  ENDOCRINE: NEGATIVE for temperature intolerance, skin/hair changes  HEME: NEGATIVE for bleeding problems  PSYCHIATRIC: NEGATIVE for changes in mood or affect     Objective    Exam  /80 (BP Location: Right arm, Patient Position: Sitting, Cuff Size: Adult Regular)   Pulse 60   Temp 97.9  F (36.6  C) (Tympanic)   Ht 1.676 m (5' 6\")   Wt 93.5 kg (206 lb 3.2 oz)   SpO2 97%   BMI 33.28 kg/m     Estimated body mass index is 33.28 kg/m  as calculated from the following:    Height as of this encounter: 1.676 m (5' 6\").    Weight as of this encounter: 93.5 kg (206 lb 3.2 oz).    Physical Exam  GENERAL: alert and no distress  EYES: Eyes grossly normal to inspection, PERRL and conjunctivae and sclerae normal  HENT: ear canals and TM's normal, nose and mouth without ulcers or lesions  NECK: no adenopathy, no asymmetry, masses, or scars  RESP: lungs clear to " auscultation - no rales, rhonchi or wheezes  CV: regular rate and rhythm, normal S1 S2, no S3 or S4, no murmur, click or rub, no peripheral edema  ABDOMEN: soft, nontender, no hepatosplenomegaly, no masses and bowel sounds normal  MS: no gross musculoskeletal defects noted, no edema  SKIN: no suspicious lesions or rashes  NEURO: Normal strength and tone, mentation intact and speech normal  PSYCH: mentation appears normal, affect normal/bright    Full annual labs and xray pending.  I will review directly with her when completed.  Refill all meds when due.  Steroid sent to keep on hand and use with MS flare.      The longitudinal plan of care for the diagnosis(es)/condition(s) as documented were addressed during this visit. Due to the added complexity in care, I will continue to support Tea in the subsequent management and with ongoing continuity of care.        Signed Electronically by: Cholo Adame MD

## 2025-03-05 ENCOUNTER — HOSPITAL ENCOUNTER (OUTPATIENT)
Dept: CARDIOLOGY | Facility: HOSPITAL | Age: 60
Discharge: HOME | End: 2025-03-05
Attending: FAMILY MEDICINE
Payer: COMMERCIAL

## 2025-03-05 DIAGNOSIS — I51.89 DIASTOLIC DYSFUNCTION: ICD-10-CM

## 2025-03-05 LAB — LVEF ECHO: NORMAL

## 2025-03-05 PROCEDURE — 93306 TTE W/DOPPLER COMPLETE: CPT

## 2025-04-16 DIAGNOSIS — I10 ESSENTIAL HYPERTENSION, BENIGN: ICD-10-CM

## 2025-04-16 RX ORDER — LOSARTAN POTASSIUM 100 MG/1
100 TABLET ORAL DAILY
Qty: 90 TABLET | Refills: 3 | Status: SHIPPED | OUTPATIENT
Start: 2025-04-16

## 2025-05-04 DIAGNOSIS — G43.809 OTHER MIGRAINE WITHOUT STATUS MIGRAINOSUS, NOT INTRACTABLE: ICD-10-CM

## 2025-05-05 RX ORDER — BUTALBITAL, ACETAMINOPHEN AND CAFFEINE 50; 325; 40 MG/1; MG/1; MG/1
TABLET ORAL
Qty: 36 TABLET | Refills: 1 | Status: SHIPPED | OUTPATIENT
Start: 2025-05-05

## 2025-05-12 DIAGNOSIS — I10 ESSENTIAL HYPERTENSION, BENIGN: ICD-10-CM

## 2025-05-12 DIAGNOSIS — I51.89 DIASTOLIC DYSFUNCTION: ICD-10-CM

## 2025-05-12 DIAGNOSIS — I16.0 HYPERTENSIVE URGENCY: ICD-10-CM

## 2025-05-13 RX ORDER — SPIRONOLACTONE 25 MG/1
25 TABLET ORAL
Qty: 90 TABLET | Refills: 2 | Status: SHIPPED | OUTPATIENT
Start: 2025-05-13

## 2025-05-13 RX ORDER — HYDROCHLOROTHIAZIDE 12.5 MG/1
1 CAPSULE ORAL 2 TIMES DAILY
Qty: 180 CAPSULE | Refills: 2 | Status: SHIPPED | OUTPATIENT
Start: 2025-05-13

## 2025-05-13 NOTE — TELEPHONE ENCOUNTER
Spironolactone      Last Written Prescription Date:  5/31/24  Last Fill Quantity: 90,   # refills: 3  Last Office Visit: 3/3/25  Future Office visit:       Routing refill request to provider for review/approval because:      HCTZ      Last Written Prescription Date:  2/17/25  Last Fill Quantity: 180,   # refills: 0  Last Office Visit: 3/3/25  Future Office visit:       Routing refill request to provider for review/approval because:

## 2025-06-13 DIAGNOSIS — M94.0 COSTOCHONDRITIS: ICD-10-CM

## 2025-06-16 RX ORDER — PREDNISONE 20 MG/1
20 TABLET ORAL 2 TIMES DAILY
Qty: 10 TABLET | Refills: 0 | Status: SHIPPED | OUTPATIENT
Start: 2025-06-16 | End: 2025-06-21

## 2025-06-16 NOTE — TELEPHONE ENCOUNTER
predniSONE (DELTASONE) 20 MG tablet       Last Written Prescription Date:  7/8/2024  Last Fill Quantity: 10,   # refills: 0  Last Office Visit: 3/3/2025

## 2025-06-16 NOTE — TELEPHONE ENCOUNTER
PREDNISONE 20MG TABLETS       Routing refill request to provider for review/approval because:  Drug not on the [facility] or [facility] refill protocol or controlled substance

## (undated) DEVICE — CAST PADDING-STERILE 2"

## (undated) DEVICE — SU ETHILON 4-0 FS-2 18" 662H

## (undated) DEVICE — BNDG ELASTIC 2"X5YDS UNSTERILE 6611-20

## (undated) DEVICE — PACK MAJOR EXTREMITY SOP15MEFCA

## (undated) DEVICE — DRSG XEROFORM 1X8"

## (undated) DEVICE — DRSG GAUZE 4X4" TRAY 6939

## (undated) DEVICE — COVER LIGHT HANDLE LT-F02

## (undated) DEVICE — BLADE CARPAL TUNNEL ENDO 81010-1

## (undated) DEVICE — PREP CHLORAPREP 26ML TINTED ORANGE  260815

## (undated) DEVICE — GLOVE PROTEXIS POWDER FREE SMT 8.5 2D72PT85X

## (undated) DEVICE — ANTIFOG SOLUTION W/FOAM PAD CF-1001

## (undated) DEVICE — TOURNIQUET SGL BLADDER 18"X4" RED 5921-218-135

## (undated) DEVICE — DRAPE STERI TOWEL LG 1010

## (undated) DEVICE — GLOVE SENSICARE 8.5 MSG1085 LATEX FREE

## (undated) RX ORDER — LIDOCAINE HYDROCHLORIDE 10 MG/ML
INJECTION, SOLUTION INFILTRATION; PERINEURAL
Status: DISPENSED
Start: 2022-01-14

## (undated) RX ORDER — BUPIVACAINE HYDROCHLORIDE 2.5 MG/ML
INJECTION, SOLUTION EPIDURAL; INFILTRATION; INTRACAUDAL
Status: DISPENSED
Start: 2022-01-14